# Patient Record
Sex: FEMALE | Race: WHITE | NOT HISPANIC OR LATINO | Employment: UNEMPLOYED | URBAN - METROPOLITAN AREA
[De-identification: names, ages, dates, MRNs, and addresses within clinical notes are randomized per-mention and may not be internally consistent; named-entity substitution may affect disease eponyms.]

---

## 2017-01-04 ENCOUNTER — OFFICE VISIT (OUTPATIENT)
Dept: FAMILY MEDICINE | Facility: CLINIC | Age: 82
End: 2017-01-04
Payer: MEDICARE

## 2017-01-04 VITALS
HEIGHT: 59 IN | WEIGHT: 129 LBS | HEART RATE: 64 BPM | BODY MASS INDEX: 26 KG/M2 | SYSTOLIC BLOOD PRESSURE: 136 MMHG | TEMPERATURE: 98 F | OXYGEN SATURATION: 95 % | DIASTOLIC BLOOD PRESSURE: 76 MMHG

## 2017-01-04 DIAGNOSIS — M41.9 SCOLIOSIS, UNSPECIFIED SCOLIOSIS TYPE, UNSPECIFIED SPINAL REGION: ICD-10-CM

## 2017-01-04 DIAGNOSIS — M19.90 ARTHRITIS: Primary | ICD-10-CM

## 2017-01-04 PROCEDURE — 1159F MED LIST DOCD IN RCRD: CPT | Mod: S$GLB,,, | Performed by: FAMILY MEDICINE

## 2017-01-04 PROCEDURE — 1160F RVW MEDS BY RX/DR IN RCRD: CPT | Mod: S$GLB,,, | Performed by: FAMILY MEDICINE

## 2017-01-04 PROCEDURE — 1157F ADVNC CARE PLAN IN RCRD: CPT | Mod: S$GLB,,, | Performed by: FAMILY MEDICINE

## 2017-01-04 PROCEDURE — 1125F AMNT PAIN NOTED PAIN PRSNT: CPT | Mod: S$GLB,,, | Performed by: FAMILY MEDICINE

## 2017-01-04 PROCEDURE — 99213 OFFICE O/P EST LOW 20 MIN: CPT | Mod: S$GLB,,, | Performed by: FAMILY MEDICINE

## 2017-01-04 RX ORDER — VERAPAMIL HYDROCHLORIDE 240 MG/1
240 TABLET, FILM COATED, EXTENDED RELEASE ORAL DAILY
Qty: 90 TABLET | Refills: 3 | Status: SHIPPED | OUTPATIENT
Start: 2017-01-04 | End: 2018-01-10 | Stop reason: SDUPTHER

## 2017-01-04 RX ORDER — HYDROCODONE BITARTRATE AND ACETAMINOPHEN 10; 325 MG/1; MG/1
1 TABLET ORAL EVERY 8 HOURS PRN
Qty: 30 TABLET | Refills: 0 | Status: SHIPPED | OUTPATIENT
Start: 2017-01-04 | End: 2017-01-12 | Stop reason: ALTCHOICE

## 2017-01-04 RX ORDER — HYDROCODONE BITARTRATE AND ACETAMINOPHEN 10; 325 MG/1; MG/1
1 TABLET ORAL EVERY 8 HOURS PRN
Qty: 30 TABLET | Refills: 0 | Status: SHIPPED | OUTPATIENT
Start: 2017-01-04 | End: 2017-01-04 | Stop reason: SDUPTHER

## 2017-01-04 RX ORDER — AMITRIPTYLINE HYDROCHLORIDE 10 MG/1
10 TABLET, FILM COATED ORAL 2 TIMES DAILY
Qty: 180 TABLET | Refills: 3 | Status: SHIPPED | OUTPATIENT
Start: 2017-01-04 | End: 2018-01-10 | Stop reason: SDUPTHER

## 2017-01-04 NOTE — MR AVS SNAPSHOT
[facility]  [address] [place], [place] [address] [place] [address]  Phone: [phone]  Fax: [phone]                  Apple Watson   2017 2:40 PM   Office Visit    Description:  Female : 1929   Provider:  Tree Rolon MD   Department:  [facility]           Reason for Visit     Annual Exam           Diagnoses this Visit        Comments    Arthritis    -  Primary     Scoliosis, unspecified scoliosis type, unspecified spinal region                To Do List           Future Appointments        Provider Department Dept Phone    2017 10:20 AM Yakov Dickens III, MD [facility] [phone]      Goals (5 Years of Data)     None      Follow-Up and Disposition     Return in about 3 months (around 2017).       These Medications        Disp Refills Start End    hydrocodone-acetaminophen 10-325mg (NORCO)  mg Tab 30 tablet 0 2017     Take 1 tablet by mouth every 8 (eight) hours as needed for Pain. - Oral    Pharmacy: [facility] [address] [place] [address] Ph #: [phone]       amitriptyline (ELAVIL) 10 MG tablet 180 tablet 3 2017     Take 1 tablet (10 mg total) by mouth 2 (two) times daily. - Oral    Pharmacy: [address] Ph #: [phone]       verapamil (CALAN-SR) 240 MG CR tablet 90 tablet 3 2017     Take 1 tablet (240 mg total) by mouth once daily. - Oral    Pharmacy: [address] Ph #: [phone]         [facility]     [facility] -  Assistance  Registered nurses in the [facility] provide clinical advisement, health education, appointment booking, and other advisory services.  Call for this free service at [phone].             Medications           Message regarding Medications     Verify the changes and/or additions to your medication regime listed below are the same  "as discussed with your clinician today.  If any of these changes or additions are incorrect, please notify your healthcare provider.        START taking these NEW medications        Refills    hydrocodone-acetaminophen 10-325mg (NORCO)  mg Tab 0    Sig: Take 1 tablet by mouth every 8 (eight) hours as needed for Pain.    Class: Print    Route: Oral      STOP taking these medications     hydrocodone-acetaminophen 7.5-325mg (NORCO) 7.5-325 mg per tablet 1 tablet every 8 (eight) hours as needed for Pain.            Verify that the below list of medications is an accurate representation of the medications you are currently taking.  If none reported, the list may be blank. If incorrect, please contact your healthcare provider. Carry this list with you in case of emergency.           Current Medications     amitriptyline (ELAVIL) 10 MG tablet Take 1 tablet (10 mg total) by mouth 2 (two) times daily.    ascorbic acid (VITAMIN C) 500 MG tablet Take 500 mg by mouth once daily.    calcium carbonate (OS-TROY) 600 mg (1,500 mg) Tab Take 600 mg by mouth 2 (two) times daily with meals.    cyanocobalamin (VITAMIN B-12) 250 MCG tablet Take 250 mcg by mouth once daily.    ergocalciferol (VITAMIN D2) 50,000 unit Cap Take 50,000 Units by mouth every 7 days.    hydrocodone-acetaminophen 10-325mg (NORCO)  mg Tab Take 1 tablet by mouth every 8 (eight) hours as needed for Pain.    ibuprofen (ADVIL,MOTRIN) 800 MG tablet Take 800 mg by mouth 2 (two) times daily with meals.    verapamil (CALAN-SR) 240 MG CR tablet Take 1 tablet (240 mg total) by mouth once daily.    vitamin A 8000 UNIT capsule Take 8,000 Units by mouth once daily.           Clinical Reference Information           Vital Signs - Last Recorded  Most recent update: 1/4/2017  2:42 PM by Neil Willis LPN    BP Pulse Temp Ht Wt SpO2    136/76 64 98.3 °F (36.8 °C) (Oral) 4' 11" (1.499 m) 58.5 kg (128 lb 15.5 oz) 95%    BMI                26.05 kg/m2          Blood " Pressure          Most Recent Value    BP  136/76      Allergies as of 1/4/2017     Celebrex [Celecoxib]    Cymbalta [Duloxetine]    Iodine And Iodide Containing Products    Lovastatin    Vioxx [Rofecoxib]      Immunizations Administered on Date of Encounter - 1/4/2017     None      Orders Placed During Today's Visit      Normal Orders This Visit    Ambulatory referral to Pain Clinic       [facility] Sign[facility]Up     Activating your [facility] account is as easy as 1-2-3!     1) Visit [email or web address], select Sign Up Now, enter this activation code and your date of birth, then select Next.  [email or web address]  Expires: 2/22/2017 10:08 PM      2) Create a username and password to use when you visit [facility] in the future and select a security question in case you lose your password and select Next.    3) Enter your e-mail address and click Sign Up!    Additional Information  If you have questions, please e-mail [email or web address] or call [phone] to talk to our [facility] staff. Remember, [facility] is NOT to be used for urgent needs. For medical emergencies, dial 911.

## 2017-01-04 NOTE — PROGRESS NOTES
Subjective:      Patient ID: Apple Watson is a 87 y.o. female.    Chief Complaint: Annual Exam (check-up and pain management referral)    HPI Comments: Here for wellness; has to change pain management doctors; has new insurance; Dr Park not on her insurance; needs a new pain management;    Review of Systems   Constitutional: Negative.    HENT: Negative.    Respiratory: Negative.    Cardiovascular: Negative.    Gastrointestinal: Negative.    Endocrine: Negative.    Genitourinary: Negative.    Musculoskeletal: Positive for back pain.   Psychiatric/Behavioral: Negative.    All other systems reviewed and are negative.    Objective:     Physical Exam   Constitutional: She is oriented to person, place, and time. She appears well-developed and well-nourished.   HENT:   Head: Normocephalic.   Eyes: Conjunctivae and EOM are normal. Pupils are equal, round, and reactive to light.   Neck: Normal range of motion. Neck supple.   Cardiovascular: Normal rate, regular rhythm and normal heart sounds.    Pulmonary/Chest: Effort normal and breath sounds normal.   Musculoskeletal: Normal range of motion.   Neurological: She is alert and oriented to person, place, and time. She has normal reflexes.   Skin: Skin is warm and dry.   Psychiatric: She has a normal mood and affect. Her behavior is normal. Judgment and thought content normal.   Nursing note and vitals reviewed.    Assessment:     1. Arthritis    2. Scoliosis, unspecified scoliosis type, unspecified spinal region      Plan:     New Prescriptions    HYDROCODONE-ACETAMINOPHEN 10-325MG (NORCO)  MG TAB    Take 1 tablet by mouth every 8 (eight) hours as needed for Pain.     Discontinued Medications    HYDROCODONE-ACETAMINOPHEN 7.5-325MG (NORCO) 7.5-325 MG PER TABLET    1 tablet every 8 (eight) hours as needed for Pain.      Modified Medications    Modified Medication Previous Medication    AMITRIPTYLINE (ELAVIL) 10 MG TABLET amitriptyline (ELAVIL) 10 MG tablet        Take 1 tablet (10 mg total) by mouth 2 (two) times daily.    Take 1 tablet (10 mg total) by mouth 2 (two) times daily.    VERAPAMIL (CALAN-SR) 240 MG CR TABLET verapamil (CALAN-SR) 240 MG CR tablet       Take 1 tablet (240 mg total) by mouth once daily.    Take 1 tablet (240 mg total) by mouth once daily.       Arthritis  -     Ambulatory referral to Pain Clinic    Scoliosis, unspecified scoliosis type, unspecified spinal region  -     Ambulatory referral to Pain Clinic    Other orders  -     Discontinue: hydrocodone-acetaminophen 10-325mg (NORCO)  mg Tab; Take 1 tablet by mouth every 8 (eight) hours as needed for Pain.  Dispense: 30 tablet; Refill: 0  -     hydrocodone-acetaminophen 10-325mg (NORCO)  mg Tab; Take 1 tablet by mouth every 8 (eight) hours as needed for Pain.  Dispense: 30 tablet; Refill: 0  -     amitriptyline (ELAVIL) 10 MG tablet; Take 1 tablet (10 mg total) by mouth 2 (two) times daily.  Dispense: 180 tablet; Refill: 3  -     verapamil (CALAN-SR) 240 MG CR tablet; Take 1 tablet (240 mg total) by mouth once daily.  Dispense: 90 tablet; Refill: 3

## 2017-01-12 ENCOUNTER — HOSPITAL ENCOUNTER (OUTPATIENT)
Dept: RADIOLOGY | Facility: HOSPITAL | Age: 82
Discharge: HOME | End: 2017-01-12
Attending: ANESTHESIOLOGY
Payer: MEDICARE

## 2017-01-12 ENCOUNTER — OFFICE VISIT (OUTPATIENT)
Dept: PAIN MEDICINE | Facility: CLINIC | Age: 82
End: 2017-01-12
Payer: MEDICARE

## 2017-01-12 VITALS
HEIGHT: 59 IN | BODY MASS INDEX: 25.87 KG/M2 | DIASTOLIC BLOOD PRESSURE: 80 MMHG | WEIGHT: 128.31 LBS | RESPIRATION RATE: 74 BRPM | SYSTOLIC BLOOD PRESSURE: 160 MMHG

## 2017-01-12 DIAGNOSIS — G89.29 CHRONIC LEFT-SIDED LOW BACK PAIN WITH SCIATICA, SCIATICA LATERALITY UNSPECIFIED: ICD-10-CM

## 2017-01-12 DIAGNOSIS — M54.40 CHRONIC LEFT-SIDED LOW BACK PAIN WITH SCIATICA, SCIATICA LATERALITY UNSPECIFIED: ICD-10-CM

## 2017-01-12 DIAGNOSIS — M41.9 SCOLIOSIS OF THORACOLUMBAR SPINE, UNSPECIFIED SCOLIOSIS TYPE: ICD-10-CM

## 2017-01-12 DIAGNOSIS — M54.16 LEFT LUMBAR RADICULOPATHY: Primary | ICD-10-CM

## 2017-01-12 PROCEDURE — 1160F RVW MEDS BY RX/DR IN RCRD: CPT | Mod: S$GLB,,, | Performed by: ANESTHESIOLOGY

## 2017-01-12 PROCEDURE — 99999 PR PBB SHADOW E&M-EST. PATIENT-LVL III: CPT | Mod: PBBFAC,,, | Performed by: ANESTHESIOLOGY

## 2017-01-12 PROCEDURE — 1157F ADVNC CARE PLAN IN RCRD: CPT | Mod: S$GLB,,, | Performed by: ANESTHESIOLOGY

## 2017-01-12 PROCEDURE — 1159F MED LIST DOCD IN RCRD: CPT | Mod: S$GLB,,, | Performed by: ANESTHESIOLOGY

## 2017-01-12 PROCEDURE — 99204 OFFICE O/P NEW MOD 45 MIN: CPT | Mod: S$GLB,,, | Performed by: ANESTHESIOLOGY

## 2017-01-12 PROCEDURE — 72070 X-RAY EXAM THORAC SPINE 2VWS: CPT | Mod: TC,PO

## 2017-01-12 PROCEDURE — 1125F AMNT PAIN NOTED PAIN PRSNT: CPT | Mod: S$GLB,,, | Performed by: ANESTHESIOLOGY

## 2017-01-12 PROCEDURE — 72100 X-RAY EXAM L-S SPINE 2/3 VWS: CPT | Mod: TC,PO

## 2017-01-12 RX ORDER — HYDROCODONE BITARTRATE AND ACETAMINOPHEN 10; 325 MG/1; MG/1
1 TABLET ORAL EVERY 8 HOURS PRN
Qty: 90 TABLET | Refills: 0 | Status: SHIPPED | OUTPATIENT
Start: 2017-01-12 | End: 2017-02-09 | Stop reason: SDUPTHER

## 2017-01-12 NOTE — LETTER
January 12, 2017      Tree Rolon MD  [address] [facility] [address]           [facility]  [address] [facility] [address]  Phone: [phone]  Fax: [phone]          Patient: Apple Watson   MR Number: [id number]   YOB: 1929   Date of Visit: 1/12/2017       Dear Dr. Tree Rolon:    Thank you for referring Apple Watson to me for evaluation. Attached you will find relevant portions of my assessment and plan of care.    If you have questions, please do not hesitate to call me. I look forward to following Apple Watson along with you.    Sincerely,    Yakov Dickens III, MD    Enclosure  CC:  No Recipients    If you would like to receive this communication electronically, please contact [email or web address] or [phone] to request more information on [facility] Link access.    For providers and/or their staff who would like to refer a patient to [facility], please contact us through our one-stop-shop provider referral line, [facility], at [phone].    If you feel you have received this communication in error or would no longer like to receive these types of communications, please e-mail [email or web address]

## 2017-01-12 NOTE — PROGRESS NOTES
Chronic Pain - New Consult    Referring Physician: Tree Rolon MD    Chief Complaint:   Chief Complaint   Patient presents with    Hip Pain        SUBJECTIVE:    Apple Watson presents to the clinic for the evaluation of left hip pain. The pain started several years ago.  She denies any accident or injury. The pain is located in the left posterior hip/buttock area and radiates down the back of the left leg to the bottom of the left foot.  The pain is described as aching and stabbing and is rated as 5/10. The pain is rated with a score of  3/10 on the BEST day and a score of 9/10 on the WORST day.  Symptoms interfere with daily activity and sleeping. The pain is exacerbated by Standing, Bending, Walking, Extension, Flexing and Getting out of bed/chair.  The pain is mitigated by heat, laying down and pain medication. The patient reports 4 hours of uninterrupted sleep per night.    She is a former patient of Dr. Park who was prescribing her Norco 10/325 TID for pain. Dr. Park is no longer on her insurance plan and she is need of a new pain management physician. She has tried non-opioid pain medication including NSAIDS, Cymbalta, and Savella which she stopped due to side effects or lack of efficacy. She had lumbar injections in the past which did not help. She has severe daily pain. Pain medications allow her to perform activities of daily living that she would otherwise not be able to perform. She denies any ongoing substance abuse.     Patient denies night fever/night sweats, urinary incontinence, bowel incontinence, significant weight loss, significant motor weakness and loss of sensations.    Physical Therapy/Home Exercise: no      Pain Disability Index Review:  Last 3 PDI Scores 1/12/2017   Pain Disability Index (PDI) 29       Pain Medications:    - Norco 10/325 TID as needed  - Amitriptyline 10 mg BID     report:  Reviewed and consistent with medication use as prescribed.    Pain  Procedures: Lumbar injections in the past    Imaging: Not available    History reviewed. No pertinent past medical history.  Past Surgical History   Procedure Laterality Date    Tonsillectomy      Appendectomy      Gallbladder surgery      Hysterectomy       Social History     Social History    Marital status:      Spouse name: N/A    Number of children: N/A    Years of education: N/A     Occupational History    Not on file.     Social History Main Topics    Smoking status: Never Smoker    Smokeless tobacco: Not on file    Alcohol use No    Drug use: No    Sexual activity: Not on file     Other Topics Concern    Not on file     Social History Narrative     History reviewed. No pertinent family history.    Review of patient's allergies indicates:   Allergen Reactions    Celebrex [celecoxib] Other (See Comments)     Blood in bowel    Cymbalta [duloxetine]     Iodine and iodide containing products     Lovastatin     Sulpho-lac [sulfur]     Vioxx [rofecoxib]     Savella [milnacipran] Rash       Current Outpatient Prescriptions   Medication Sig    amitriptyline (ELAVIL) 10 MG tablet Take 1 tablet (10 mg total) by mouth 2 (two) times daily.    ascorbic acid (VITAMIN C) 500 MG tablet Take 500 mg by mouth once daily.    cyanocobalamin (VITAMIN B-12) 250 MCG tablet Take 250 mcg by mouth once daily.    ergocalciferol (VITAMIN D2) 50,000 unit Cap Take 50,000 Units by mouth every 7 days.    verapamil (CALAN-SR) 240 MG CR tablet Take 1 tablet (240 mg total) by mouth once daily.    vitamin A 8000 UNIT capsule Take 8,000 Units by mouth once daily.    hydrocodone-acetaminophen 10-325mg (NORCO)  mg Tab Take 1 tablet by mouth every 8 (eight) hours as needed for Pain.     No current facility-administered medications for this visit.        REVIEW OF SYSTEMS:  GENERAL: No weight loss, malaise or fevers.  HEENT: Negative for frequent or significant headaches.  NECK: Negative for lumps or  "significant neck swelling.  RESPIRATORY: Negative for cough, wheezing or shortness of breath.  CARDIOVASCULAR: Negative for chest pain or palpitations. +HTN  GI: No blood in stools or black stools or change in bowel habits.  : Negative for kidney stones, urinary tract infections, or incontinence.  MUSCULOSKELETAL: See HPI  SKIN: Negative for lesions, rash, and itching.  PSYCH: + sleep disturbance   HEMATOLOGY/LYMPHOLOGY Negative for prolonged bleeding, bruising easily or swollen nodes.  NEURO:  No history of syncope, seizures or tremors.    OBJECTIVE:    Visit Vitals    BP (!) 160/80 (BP Location: Right arm, Patient Position: Sitting, BP Method: Manual)    Resp (!) 74    Ht 4' 11" (1.499 m)    Wt 58.2 kg (128 lb 4.8 oz)    BMI 25.91 kg/m2       PHYSICAL EXAMINATION:  GENERAL: Well appearing, in no acute distress.  PSYCH:  Mood and affect is appropriate.  Awake, alert, and oriented x 3.  SKIN: Skin color, texture, turgor normal, no rashes or lesions  HEENT: Normocephalic, atraumatic.  EOM intact.  CV: Radial pulses are 2+.  RESP:  Respirations are unlabored.  GI: Abdomen soft and non-tender.  MSK:  No atrophy or tone abnormalities are noted.      Neck: No pain with neck flexion, extension, or lateral rotation.  No obvious deformity or signs of trauma.  Normal cervical spine range of motion.    Back: Straight leg raising in the sitting position is positive for radicular pain on the left. No pain to palpation over the lumbar spine and paraspinous muscles.  Negative for pain with facet loading and back extension/rotation. Decreased range of motion with pain reproduction on flexion and extension.    Buttocks:  No pain to palpation over the PSIS.    Extremities:  Peripheral joint ROM is full and pain free without obvious instability or laxity in all four extremities. No edema or skin discolorations noted.     Gait:  Gait is normal    NEUR:  No loss of sensation is noted.     Strength testing:    Right hip flexion: " 5/5  Left hip flexion: 5/5  Right knee extension: 5/5  Left knee extension: 5/5  Right knee flexion: 5/5  Left knee flexion: 5/5  Right ankle dorsiflexion: 5/5  Left ankle dorsiflexion: 5/5    Reflexes:  2+ Right Patellar reflex, 2+ Left Patellar reflex  2+ Right Achilles reflex, 2+ Left Achilles reflex      ASSESSMENT: 87 y.o. female with chronic low back and left leg pain, consistent with lumbar radiculopathy.     1. Left lumbar radiculopathy    2. Scoliosis of thoracolumbar spine, unspecified scoliosis type    3. 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  - Obtain old records from outside physicians and imaging.  - Order x-rays of thoracic and lumbar spine.  - Continue Norco 10/325 TID as needed. Opioid agreement reviewed and signed with patient. Key points clarified including UDS policy, our refill protocol, and the importance of taking medication as prescribed and not sharing medications with others. Will attempt to avoid further increases in opioids by use of interventions.  - I discussed with the patient potential secondary side effects of medication prescribed and urged the patient to read all FDA approved materials that the pharmacy provides with the prescription.   - RTC in 4 weeks.    The above plan and management options were discussed at length with patient. Patient is in agreement with the above and verbalized understanding. It will be communicated with the referring physician via electronic record, fax, or mail.    Yakov Dickens III  01/12/2017

## 2017-02-09 ENCOUNTER — OFFICE VISIT (OUTPATIENT)
Dept: PAIN MEDICINE | Facility: CLINIC | Age: 82
End: 2017-02-09
Payer: MEDICARE

## 2017-02-09 VITALS
DIASTOLIC BLOOD PRESSURE: 90 MMHG | HEIGHT: 59 IN | SYSTOLIC BLOOD PRESSURE: 178 MMHG | HEART RATE: 96 BPM | WEIGHT: 125.69 LBS | BODY MASS INDEX: 25.34 KG/M2 | RESPIRATION RATE: 20 BRPM

## 2017-02-09 DIAGNOSIS — M47.816 OSTEOARTHRITIS OF LUMBAR SPINE, UNSPECIFIED SPINAL OSTEOARTHRITIS COMPLICATION STATUS: Primary | ICD-10-CM

## 2017-02-09 DIAGNOSIS — M41.9 SCOLIOSIS OF THORACOLUMBAR SPINE, UNSPECIFIED SCOLIOSIS TYPE: ICD-10-CM

## 2017-02-09 DIAGNOSIS — M43.17 SPONDYLOLISTHESIS OF LUMBOSACRAL REGION: ICD-10-CM

## 2017-02-09 DIAGNOSIS — G89.4 CHRONIC PAIN DISORDER: ICD-10-CM

## 2017-02-09 DIAGNOSIS — S22.000A COMPRESSION FRACTURE OF BODY OF THORACIC VERTEBRA: ICD-10-CM

## 2017-02-09 DIAGNOSIS — M81.0 OSTEOPOROSIS: ICD-10-CM

## 2017-02-09 DIAGNOSIS — Z79.891 LONG TERM (CURRENT) USE OF OPIATE ANALGESIC: ICD-10-CM

## 2017-02-09 PROCEDURE — 1125F AMNT PAIN NOTED PAIN PRSNT: CPT | Mod: S$GLB,,, | Performed by: ANESTHESIOLOGY

## 2017-02-09 PROCEDURE — 99999 PR PBB SHADOW E&M-EST. PATIENT-LVL III: CPT | Mod: PBBFAC,,, | Performed by: ANESTHESIOLOGY

## 2017-02-09 PROCEDURE — 1160F RVW MEDS BY RX/DR IN RCRD: CPT | Mod: S$GLB,,, | Performed by: ANESTHESIOLOGY

## 2017-02-09 PROCEDURE — 99213 OFFICE O/P EST LOW 20 MIN: CPT | Mod: S$GLB,,, | Performed by: ANESTHESIOLOGY

## 2017-02-09 PROCEDURE — 1159F MED LIST DOCD IN RCRD: CPT | Mod: S$GLB,,, | Performed by: ANESTHESIOLOGY

## 2017-02-09 PROCEDURE — 1157F ADVNC CARE PLAN IN RCRD: CPT | Mod: S$GLB,,, | Performed by: ANESTHESIOLOGY

## 2017-02-09 RX ORDER — GABAPENTIN 300 MG/1
300 CAPSULE ORAL NIGHTLY
Qty: 30 CAPSULE | Refills: 11 | Status: ON HOLD | OUTPATIENT
Start: 2017-02-09 | End: 2018-12-03 | Stop reason: HOSPADM

## 2017-02-09 RX ORDER — HYDROCODONE BITARTRATE AND ACETAMINOPHEN 10; 325 MG/1; MG/1
1 TABLET ORAL EVERY 8 HOURS PRN
Qty: 90 TABLET | Refills: 0 | Status: SHIPPED | OUTPATIENT
Start: 2017-03-12 | End: 2017-03-01 | Stop reason: SDUPTHER

## 2017-02-09 RX ORDER — HYDROCODONE BITARTRATE AND ACETAMINOPHEN 10; 325 MG/1; MG/1
1 TABLET ORAL EVERY 8 HOURS PRN
Qty: 90 TABLET | Refills: 0 | Status: SHIPPED | OUTPATIENT
Start: 2017-02-10 | End: 2017-03-12

## 2017-02-09 NOTE — PROGRESS NOTES
Chronic patient Established Note (Follow up visit)      SUBJECTIVE:    Apple Watson presents to the clinic for a follow-up appointment for back pain. Since the last visit, Apple Watson states the pain has been persistent. Current pain intensity is 6/10.  She complains today of pain throughout the thoracic and lumbar spine area. She does not report any new area of pain. She does not have any radicular pain. She is not interested in any spine intervention. She is not interested in surgical referral.    She has tried non-opioid pain medication including NSAIDS, Cymbalta, and Savella which she stopped due to side effects or lack of efficacy. She had lumbar injections in the past which did not help. She has severe daily pain. Pain medications allow her to perform activities of daily living that she would otherwise not be able to perform. She denies any ongoing substance abuse.     Patient denies night fever/night sweats, urinary incontinence, bowel incontinence, significant weight loss, significant motor weakness and loss of sensations.    Pain Disability Index Review:  Last 3 PDI Scores 2/9/2017 1/12/2017   Pain Disability Index (PDI) 39 29       Pain Medications:     - Norco 10/325 TID as needed  - Amitriptyline 10 mg BID      report: Reviewed and consistent with medication use as prescribed.     Pain Procedures:   Left Facet Joint Injection at L4/5 and L5/S1- Records Scanned (2011)  Left Sacroiliac Joint Injection - Records Scanned (2011)  Left S1 - TESI - Records scanned (5/15/2012)     Imaging:    Lumbar X-ray (1/2017):    Findings:  There is lumbar scoliosis convex to left..    Bones are osteopenic.    Grade 2 L5-S1 spondylolisthesis.  There is also retrolisthesis L2 on L3 by approximate 5 mm.  There appear to be L5 pars defects.  There is disc space narrowing involving L2-S1.  Anterior osteophyte formation at several levels.  Compression of T7 and T8, age indeterminate. Stable alignment on the flexion  and extension views.    Atherosclerotic abdominal aorta measuring up to approximately 2.6 cm in AP dimension.    Thoracic X-ray (1/2017):    FINDINGS:  The bones are osteopenic.  Mild curvature of the thoracic spine convex to the right with a Ferguson angle measuring 9 degrees..  There is compression of T3, T7 and T8, age indeterminate.  Disc space narrowing at multiple levels.    Allergies:   Review of patient's allergies indicates:   Allergen Reactions    Celebrex [celecoxib] Other (See Comments)     Blood in bowel    Cymbalta [duloxetine]     Iodine and iodide containing products     Lovastatin     Sulpho-lac [sulfur]     Vioxx [rofecoxib]     Savella [milnacipran] Rash       Current Medications:   Current Outpatient Prescriptions   Medication Sig Dispense Refill    amitriptyline (ELAVIL) 10 MG tablet Take 1 tablet (10 mg total) by mouth 2 (two) times daily. 180 tablet 3    ascorbic acid (VITAMIN C) 500 MG tablet Take 500 mg by mouth once daily.      cyanocobalamin (VITAMIN B-12) 250 MCG tablet Take 250 mcg by mouth once daily.      ergocalciferol (VITAMIN D2) 50,000 unit Cap Take 50,000 Units by mouth every 7 days.      [START ON 2/10/2017] hydrocodone-acetaminophen 10-325mg (NORCO)  mg Tab Take 1 tablet by mouth every 8 (eight) hours as needed for Pain. 90 tablet 0    verapamil (CALAN-SR) 240 MG CR tablet Take 1 tablet (240 mg total) by mouth once daily. 90 tablet 3    vitamin A 8000 UNIT capsule Take 8,000 Units by mouth once daily.      gabapentin (NEURONTIN) 300 MG capsule Take 1 capsule (300 mg total) by mouth every evening. 30 capsule 11    [START ON 3/12/2017] hydrocodone-acetaminophen 10-325mg (NORCO)  mg Tab Take 1 tablet by mouth every 8 (eight) hours as needed for Pain. 90 tablet 0     No current facility-administered medications for this visit.        REVIEW OF SYSTEMS:    GENERAL: No weight loss, malaise or fevers.  HEENT: Negative for frequent or significant  "headaches.  NECK: Negative for lumps or significant neck swelling.  RESPIRATORY: Negative for cough, wheezing or shortness of breath.  CARDIOVASCULAR: Negative for chest pain or palpitations. +HTN  GI: No blood in stools or black stools or change in bowel habits.  : Negative for kidney stones, urinary tract infections, or incontinence.  MUSCULOSKELETAL: See HPI  SKIN: Negative for lesions, rash, and itching.  PSYCH: + sleep disturbance   HEMATOLOGY/LYMPHOLOGY Negative for prolonged bleeding, bruising easily or swollen nodes.  NEURO: No history of syncope, seizures or tremors.    Past Medical History:  History reviewed. No pertinent past medical history.    Past Surgical History:  Past Surgical History   Procedure Laterality Date    Tonsillectomy      Appendectomy      Gallbladder surgery      Hysterectomy         Family History:  History reviewed. No pertinent family history.    Social History:  Social History     Social History    Marital status:      Spouse name: N/A    Number of children: N/A    Years of education: N/A     Social History Main Topics    Smoking status: Never Smoker    Smokeless tobacco: None    Alcohol use No    Drug use: No    Sexual activity: Not Asked     Other Topics Concern    None     Social History Narrative       OBJECTIVE:    Visit Vitals    BP (!) 178/90    Pulse 96    Resp 20    Ht 4' 11" (1.499 m)    Wt 57 kg (125 lb 11.2 oz)    BMI 25.39 kg/m2       PHYSICAL EXAMINATION:  GENERAL: Well appearing, in no acute distress.  PSYCH: Mood and affect is appropriate. Awake, alert, and oriented x 3.  SKIN: Skin color, texture, turgor normal, no rashes or lesions  HEENT: Normocephalic, atraumatic. EOM intact.  CV: Radial pulses are 2+.  RESP: Respirations are unlabored.  GI: Abdomen soft and non-tender.  MSK: No atrophy or tone abnormalities are noted.       Neck: No pain with neck flexion, extension, or lateral rotation. Normal cervical spine range of " motion.     Back: Thoracic kyphosis. Diffuse tenderness to palpation over the thoracic spine. No pain to palpation over the lumbar spine and paraspinous muscles. Positive for pain with facet loading and back extension/rotation. Decreased range of motion with pain reproduction on flexion and extension.     Buttocks: No pain to palpation over the PSIS.     Extremities: Peripheral joint ROM is full and pain free without obvious instability or laxity in all four extremities. No edema or skin discolorations noted.      Gait: Gait is normal     NEUR: No loss of sensation is noted. Lower extremity strength is normal and symmetric.    ASSESSMENT:      1. Osteoarthritis of lumbar spine, unspecified spinal osteoarthritis complication status    2. Scoliosis of thoracolumbar spine, unspecified scoliosis type    3. Spondylolisthesis of lumbosacral region    4. Compression fracture of body of thoracic vertebra    5. Osteoporosis    6. Long term (current) use of opiate analgesic    7. Chronic pain disorder          PLAN:     - I have stressed the importance of physical activity and a home exercise plan to help with pain and improve health.  - Outside records reviewed.  - Start Gabapentin 300 mg QHS.  - OK to take Motrin/Tylenol as needed between Norco.  - Continue Norco 10/325 TID as needed. Opioid agreement reviewed. Key points clarified including UDS policy, our refill protocol, and the importance of taking medication as prescribed and not sharing medications with others. Will attempt to avoid further increases in opioids by use of interventions.  - I discussed with the patient potential secondary side effects of medication prescribed and urged the patient to read all FDA approved materials that the pharmacy provides with the prescription.  - Advised the patient to follow-up with Dr. Rolon for further management of hypertension.   - RTC in 8 weeks.    The above plan and management options were discussed at length with patient.  Patient is in agreement with the above and verbalized understanding.    Yakov Dickens III  02/09/2017

## 2017-02-09 NOTE — MR AVS SNAPSHOT
[facility]  [address] [place] [address]  Phone: [phone]  Fax: [phone]                  Apple Watson   2017 8:20 AM   Office Visit    Description:  Female : 1929   Provider:  Yakov Dickens III, MD   Department:  [facility]           Reason for Visit     Back Pain                To Do List           Future Appointments        Provider Department Dept Phone    2017 8:40 AM Yakov Dickens III, MD [facility] [phone]      Goals (5 Years of Data)     None      [facility]     [facility] Line -  Assistance  Registered nurses in the [facility] On Call Center provide clinical advisement, health education, appointment booking, and other advisory services.  Call for this free service at [phone].             Medications           Message regarding Medications     Verify the changes and/or additions to your medication regime listed below are the same as discussed with your clinician today.  If any of these changes or additions are incorrect, please notify your healthcare provider.             Verify that the below list of medications is an accurate representation of the medications you are currently taking.  If none reported, the list may be blank. If incorrect, please contact your healthcare provider. Carry this list with you in case of emergency.           Current Medications     amitriptyline (ELAVIL) 10 MG tablet Take 1 tablet (10 mg total) by mouth 2 (two) times daily.    ascorbic acid (VITAMIN C) 500 MG tablet Take 500 mg by mouth once daily.    cyanocobalamin (VITAMIN B-12) 250 MCG tablet Take 250 mcg by mouth once daily.    ergocalciferol (VITAMIN D2) 50,000 unit Cap Take 50,000 Units by mouth every 7 days.    hydrocodone-acetaminophen 10-325mg (NORCO)  mg Tab Take 1 tablet by mouth every 8 (eight) hours as needed for Pain.    verapamil (CALAN-SR) 240 MG CR tablet Take 1 tablet  "(240 mg total) by mouth once daily.    vitamin A 8000 UNIT capsule Take 8,000 Units by mouth once daily.           Clinical Reference Information           Your Vitals Were     BP Pulse Resp Height Weight BMI    178/90 96 20 4' 11" (1.499 m) 57 kg (125 lb 11.2 oz) 25.39 kg/m2      Blood Pressure          Most Recent Value    BP  (!)  178/90      Allergies as of 2/9/2017     Celebrex [Celecoxib]    Cymbalta [Duloxetine]    Iodine And Iodide Containing Products    Lovastatin    Sulpho-lac [Sulfur]    Vioxx [Rofecoxib]    Savella [Milnacipran]      Immunizations Administered on Date of Encounter - 2/9/2017     None      [facility] Sign-Up     Activating your [facility] account is as easy as 1-2-3!     1) Visit [email or web address], select Sign Up Now, enter this activation code and your date of birth, then select Next.  [email or web address]  Expires: 2/22/2017 10:08 PM      2) Create a username and password to use when you visit [facility] in the future and select a security question in case you lose your password and select Next.    3) Enter your e-mail address and click Sign Up!    Additional Information  If you have questions, please e-mail [email or web address] or call [phone] to talk to our [facility] staff. Remember, [facility] is NOT to be used for urgent needs. For medical emergencies, dial 911.         Language Assistance Services     ATTENTION: Language assistance services are available, free of charge. Please call [phone].      ATENCIÓN: Si habla espjodi, tiene a serrano disposición servicios gratuitos de asistencia lingüística. Llame al [phone].     ELIECER Ý: N?u b?n nói Ti?ng Vi?t, có các d?ch v? h? tr? ngôn ng? mi?n phí dành cho b?n. G?i s? [phone].         [facility] complies with applicable Federal civil rights laws and does not discriminate on the basis of race, color, national origin, age, disability, or sex.        "

## 2017-03-01 ENCOUNTER — OFFICE VISIT (OUTPATIENT)
Dept: FAMILY MEDICINE | Facility: CLINIC | Age: 82
End: 2017-03-01
Payer: MEDICARE

## 2017-03-01 VITALS
TEMPERATURE: 98 F | WEIGHT: 128.5 LBS | HEIGHT: 59 IN | SYSTOLIC BLOOD PRESSURE: 162 MMHG | BODY MASS INDEX: 25.91 KG/M2 | OXYGEN SATURATION: 96 % | HEART RATE: 78 BPM | DIASTOLIC BLOOD PRESSURE: 84 MMHG

## 2017-03-01 DIAGNOSIS — I49.9 IRREGULAR HEART BEAT: ICD-10-CM

## 2017-03-01 DIAGNOSIS — M41.9 SCOLIOSIS OF THORACOLUMBAR SPINE, UNSPECIFIED SCOLIOSIS TYPE: ICD-10-CM

## 2017-03-01 DIAGNOSIS — M81.0 OSTEOPOROSIS: ICD-10-CM

## 2017-03-01 DIAGNOSIS — M43.10 SPONDYLOLISTHESIS, GRADE 2: ICD-10-CM

## 2017-03-01 DIAGNOSIS — I10 ESSENTIAL HYPERTENSION: Primary | ICD-10-CM

## 2017-03-01 DIAGNOSIS — I10 WHITE COAT SYNDROME WITH DIAGNOSIS OF HYPERTENSION: ICD-10-CM

## 2017-03-01 DIAGNOSIS — I70.0 CALCIFICATION OF AORTA: ICD-10-CM

## 2017-03-01 DIAGNOSIS — M19.90 ARTHRITIS: ICD-10-CM

## 2017-03-01 PROCEDURE — 93005 ELECTROCARDIOGRAM TRACING: CPT | Mod: S$GLB,,, | Performed by: FAMILY MEDICINE

## 2017-03-01 PROCEDURE — 1160F RVW MEDS BY RX/DR IN RCRD: CPT | Mod: S$GLB,,, | Performed by: FAMILY MEDICINE

## 2017-03-01 PROCEDURE — 1157F ADVNC CARE PLAN IN RCRD: CPT | Mod: S$GLB,,, | Performed by: FAMILY MEDICINE

## 2017-03-01 PROCEDURE — 1125F AMNT PAIN NOTED PAIN PRSNT: CPT | Mod: S$GLB,,, | Performed by: FAMILY MEDICINE

## 2017-03-01 PROCEDURE — 1159F MED LIST DOCD IN RCRD: CPT | Mod: S$GLB,,, | Performed by: FAMILY MEDICINE

## 2017-03-01 PROCEDURE — 99499 UNLISTED E&M SERVICE: CPT | Mod: S$GLB,,, | Performed by: FAMILY MEDICINE

## 2017-03-01 PROCEDURE — 93010 ELECTROCARDIOGRAM REPORT: CPT | Mod: ,,, | Performed by: INTERNAL MEDICINE

## 2017-03-01 PROCEDURE — 99213 OFFICE O/P EST LOW 20 MIN: CPT | Mod: S$GLB,,, | Performed by: FAMILY MEDICINE

## 2017-03-01 NOTE — PROGRESS NOTES
Subjective:      Patient ID: Apple Watson is a 87 y.o. female.    Chief Complaint: Hypertension    HPI Comments: Saw pain MD; wants to see Dr Pete; bp good at home; high in our offices    Hypertension       Review of Systems   Constitutional: Negative.    HENT: Negative.    Respiratory: Negative.    Cardiovascular: Negative.    Gastrointestinal: Negative.    Endocrine: Negative.    Genitourinary: Negative.    Musculoskeletal: Positive for back pain.   Psychiatric/Behavioral: Negative.    All other systems reviewed and are negative.    Objective:     Physical Exam   Constitutional: She is oriented to person, place, and time. She appears well-developed and well-nourished.   HENT:   Head: Normocephalic.   Eyes: Conjunctivae and EOM are normal. Pupils are equal, round, and reactive to light.   Neck: Normal range of motion. Neck supple.   Cardiovascular: Normal rate, regular rhythm and normal heart sounds.    Pulmonary/Chest: Effort normal and breath sounds normal.   Musculoskeletal: Normal range of motion.   Neurological: She is alert and oriented to person, place, and time. She has normal reflexes.   Skin: Skin is warm and dry.   Psychiatric: She has a normal mood and affect. Her behavior is normal. Judgment and thought content normal.   Nursing note and vitals reviewed.    Assessment:     1. Essential hypertension    2. Arthritis    3. Osteoporosis    4. Scoliosis of thoracolumbar spine, unspecified scoliosis type    5. Spondylolisthesis, grade 2    6. White coat syndrome with diagnosis of hypertension    7. Calcification of aorta    8. Irregular heart beat      Plan:     New Prescriptions    No medications on file     Discontinued Medications    HYDROCODONE-ACETAMINOPHEN 10-325MG (NORCO)  MG TAB    Take 1 tablet by mouth every 8 (eight) hours as needed for Pain.     Modified Medications    No medications on file       Essential hypertension  -     Ambulatory referral to Pain Clinic  -     EKG  12-lead    Arthritis  -     Ambulatory referral to Pain Clinic  -     EKG 12-lead    Osteoporosis  -     Ambulatory referral to Pain Clinic  -     EKG 12-lead    Scoliosis of thoracolumbar spine, unspecified scoliosis type  -     Ambulatory referral to Pain Clinic  -     EKG 12-lead    Spondylolisthesis, grade 2  -     Ambulatory referral to Pain Clinic  -     EKG 12-lead    White coat syndrome with diagnosis of hypertension  -     EKG 12-lead    Calcification of aorta  -     EKG 12-lead    Irregular heart beat  -     EKG 12-lead    pt will come back with her cuff and check her bp here and if same numbers, will use home numbers as baseline.  xrays of t and l spine reviewed; terrible back, no wonder she hurts.

## 2017-03-01 NOTE — MR AVS SNAPSHOT
[place] [address] [place] [address]  Phone: [phone]  Fax: [phone]                  Apple Watson   3/1/2017 11:00 AM   Office Visit    Description:  Female : 1929   Provider:  Tree Rolon MD   Department:  [facility]           Reason for Visit     Hypertension           Diagnoses this Visit        Comments    Essential hypertension    -  Primary     Arthritis         Osteoporosis         Scoliosis of thoracolumbar spine, unspecified scoliosis type         Spondylolisthesis, grade 2         White coat syndrome with diagnosis of hypertension         Calcification of aorta         Irregular heart beat                To Do List           Future Appointments        Provider Department Dept Phone    2017 8:40 AM Yakov Dickens III, MD [facility] [phone]      Goals (5 Years of Data)     None      Follow-Up and Disposition     Return in about 1 week (around 3/8/2017).      [facility]     [facility] On Call Nurse Care Line -  Assistance  Registered nurses in the [facility] On Call Center provide clinical advisement, health education, appointment booking, and other advisory services.  Call for this free service at [phone].             Medications           Message regarding Medications     Verify the changes and/or additions to your medication regime listed below are the same as discussed with your clinician today.  If any of these changes or additions are incorrect, please notify your healthcare provider.             Verify that the below list of medications is an accurate representation of the medications you are currently taking.  If none reported, the list may be blank. If incorrect, please contact your healthcare provider. Carry this list with you in case of emergency.           Current Medications     amitriptyline (ELAVIL) 10 MG tablet Take 1 tablet (10 mg total) by mouth 2 (two) times daily.    ascorbic  acid (VITAMIN C) 500 MG tablet Take 500 mg by mouth once daily.    cyanocobalamin (VITAMIN B-12) 250 MCG tablet Take 250 mcg by mouth once daily.    ergocalciferol (VITAMIN D2) 50,000 unit Cap Take 50,000 Units by mouth every 7 days.    gabapentin (NEURONTIN) 300 MG capsule Take 1 capsule (300 mg total) by mouth every evening.    hydrocodone-acetaminophen 10-325mg (NORCO)  mg Tab Take 1 tablet by mouth every 8 (eight) hours as needed for Pain.    verapamil (CALAN-SR) 240 MG CR tablet Take 1 tablet (240 mg total) by mouth once daily.    vitamin A 8000 UNIT capsule Take 8,000 Units by mouth once daily.           Clinical Reference Information           Your Vitals Were     BP                   162/84           Blood Pressure          Most Recent Value    BP  (!)  162/84      Allergies as of 3/1/2017     Celebrex [Celecoxib]    Cymbalta [Duloxetine]    Iodine And Iodide Containing Products    Lovastatin    Sulpho-lac [Sulfur]    Vioxx [Rofecoxib]    Savella [Milnacipran]      Immunizations Administered on Date of Encounter - 3/1/2017     None      Orders Placed During Today's Visit      Normal Orders This Visit    Ambulatory referral to Pain Clinic     EKG 12-lead       [facility] Sign-Up     Activating your [facility] account is as easy as 1-2-3!     1) Visit [email or web address], select Sign Up Now, enter this activation code and your date of birth, then select Next.  [email or web address]  Expires: 4/16/2017  6:24 AM      2) Create a username and password to use when you visit [facility] in the future and select a security question in case you lose your password and select Next.    3) Enter your e-mail address and click Sign Up!    Additional Information  If you have questions, please e-mail [email or web address] or call [phone] to talk to our [facility] staff. Remember, [facility] is NOT to be used for urgent needs. For medical emergencies, dial 911.         Language Assistance Services     ATTENTION: Language  assistance services are available, free of charge. Please call [phone].      ATENCIÓN: Si surekha oden, tiene a serrano disposición servicios gratuitos de asistencia lingüística. Llame al [phone].     CHÚ Ý: N?u b?n nói Ti?ng Vi?t, có các d?ch v? h? tr? ngôn ng? mi?n phí dành cho b?n. G?i s? [phone].         [facility] complies with applicable Federal civil rights laws and does not discriminate on the basis of race, color, national origin, age, disability, or sex.

## 2018-01-10 ENCOUNTER — OFFICE VISIT (OUTPATIENT)
Dept: FAMILY MEDICINE | Facility: CLINIC | Age: 83
End: 2018-01-10
Payer: MEDICARE

## 2018-01-10 VITALS
WEIGHT: 128.5 LBS | HEART RATE: 82 BPM | BODY MASS INDEX: 25.91 KG/M2 | DIASTOLIC BLOOD PRESSURE: 80 MMHG | SYSTOLIC BLOOD PRESSURE: 130 MMHG | TEMPERATURE: 99 F | HEIGHT: 59 IN | OXYGEN SATURATION: 96 %

## 2018-01-10 DIAGNOSIS — I70.0 AORTIC CALCIFICATION: ICD-10-CM

## 2018-01-10 DIAGNOSIS — M41.9 SCOLIOSIS OF THORACOLUMBAR SPINE, UNSPECIFIED SCOLIOSIS TYPE: ICD-10-CM

## 2018-01-10 DIAGNOSIS — M43.10 SPONDYLOLISTHESIS, GRADE 2: ICD-10-CM

## 2018-01-10 DIAGNOSIS — I49.9 IRREGULAR HEART BEAT: ICD-10-CM

## 2018-01-10 DIAGNOSIS — I10 WHITE COAT SYNDROME WITH DIAGNOSIS OF HYPERTENSION: ICD-10-CM

## 2018-01-10 DIAGNOSIS — M19.90 ARTHRITIS: ICD-10-CM

## 2018-01-10 DIAGNOSIS — I70.0 CALCIFICATION OF AORTA: ICD-10-CM

## 2018-01-10 DIAGNOSIS — M81.0 OSTEOPOROSIS, UNSPECIFIED OSTEOPOROSIS TYPE, UNSPECIFIED PATHOLOGICAL FRACTURE PRESENCE: ICD-10-CM

## 2018-01-10 DIAGNOSIS — Z00.00 WELLNESS EXAMINATION: Primary | ICD-10-CM

## 2018-01-10 DIAGNOSIS — I10 ESSENTIAL HYPERTENSION: ICD-10-CM

## 2018-01-10 PROCEDURE — 99499 UNLISTED E&M SERVICE: CPT | Mod: S$GLB,,, | Performed by: FAMILY MEDICINE

## 2018-01-10 PROCEDURE — 99397 PER PM REEVAL EST PAT 65+ YR: CPT | Mod: S$GLB,,, | Performed by: FAMILY MEDICINE

## 2018-01-10 RX ORDER — HYDROCODONE BITARTRATE AND ACETAMINOPHEN 10; 325 MG/1; MG/1
TABLET ORAL
Status: ON HOLD | COMMUNITY
Start: 2017-12-29 | End: 2018-11-14 | Stop reason: HOSPADM

## 2018-01-10 RX ORDER — AMITRIPTYLINE HYDROCHLORIDE 10 MG/1
10 TABLET, FILM COATED ORAL 2 TIMES DAILY
Qty: 180 TABLET | Refills: 3 | Status: ON HOLD | OUTPATIENT
Start: 2018-01-10 | End: 2018-12-03 | Stop reason: HOSPADM

## 2018-01-10 RX ORDER — VERAPAMIL HYDROCHLORIDE 240 MG/1
240 TABLET, FILM COATED, EXTENDED RELEASE ORAL DAILY
Qty: 90 TABLET | Refills: 3 | Status: ON HOLD | OUTPATIENT
Start: 2018-01-10 | End: 2018-12-03 | Stop reason: HOSPADM

## 2018-01-10 NOTE — PROGRESS NOTES
Subjective:      Patient ID: Apple Watson is a 88 y.o. female.    Chief Complaint: Annual Exam    Wellness; Rx ; goes to [facility]      Review of Systems   Constitutional: Negative.    HENT: Negative.    Respiratory: Negative.    Cardiovascular: Negative.    Gastrointestinal: Negative.    Endocrine: Negative.    Genitourinary: Negative.    Musculoskeletal: Negative.    Psychiatric/Behavioral: Negative.    All other systems reviewed and are negative.    Objective:     Physical Exam   Constitutional: She is oriented to person, place, and time. She appears well-developed and well-nourished.   HENT:   Head: Normocephalic.   Eyes: Conjunctivae and EOM are normal. Pupils are equal, round, and reactive to light.   Neck: Normal range of motion. Neck supple.   Cardiovascular: Normal rate, regular rhythm and normal heart sounds.    Pulmonary/Chest: Effort normal and breath sounds normal.   Musculoskeletal: Normal range of motion.   Neurological: She is alert and oriented to person, place, and time. She has normal reflexes.   Skin: Skin is warm and dry.   Psychiatric: She has a normal mood and affect. Her behavior is normal. Judgment and thought content normal.     Assessment:     1. Wellness examination    2. Arthritis    3. Osteoporosis, unspecified osteoporosis type, unspecified pathological fracture presence    4. Scoliosis of thoracolumbar spine, unspecified scoliosis type    5. Spondylolisthesis, grade 2    6. Aortic calcification    7. Calcification of aorta    8. Essential hypertension    9. Irregular heart beat    10. White coat syndrome with diagnosis of hypertension      Plan:     New Prescriptions    No medications on file     Discontinued Medications    ASCORBIC ACID (VITAMIN C) 500 MG TABLET    Take 500 mg by mouth once daily.     Modified Medications    Modified Medication Previous Medication    AMITRIPTYLINE (ELAVIL) 10 MG TABLET amitriptyline (ELAVIL) 10 MG tablet       Take 1 tablet (10 mg total) by mouth 2  (two) times daily.    Take 1 tablet (10 mg total) by mouth 2 (two) times daily.    VERAPAMIL (CALAN-SR) 240 MG CR TABLET verapamil (CALAN-SR) 240 MG CR tablet       Take 1 tablet (240 mg total) by mouth once daily.    Take 1 tablet (240 mg total) by mouth once daily.       Wellness examination    Arthritis    Osteoporosis, unspecified osteoporosis type, unspecified pathological fracture presence    Scoliosis of thoracolumbar spine, unspecified scoliosis type    Spondylolisthesis, grade 2    Aortic calcification    Calcification of aorta    Essential hypertension    Irregular heart beat    White coat syndrome with diagnosis of hypertension    Other orders  -     verapamil (CALAN-SR) 240 MG CR tablet; Take 1 tablet (240 mg total) by mouth once daily.  Dispense: 90 tablet; Refill: 3  -     amitriptyline (ELAVIL) 10 MG tablet; Take 1 tablet (10 mg total) by mouth 2 (two) times daily.  Dispense: 180 tablet; Refill: 3

## 2018-09-05 ENCOUNTER — TELEPHONE (OUTPATIENT)
Dept: FAMILY MEDICINE | Facility: CLINIC | Age: 83
End: 2018-09-05

## 2018-09-05 NOTE — TELEPHONE ENCOUNTER
I spoke to pt - she had questions about her pain meds that were rx'd by Dr Pete.      Pt advised to give them a call since we can't see his notes here.  She will call back if she can't get it resolved.

## 2018-09-05 NOTE — TELEPHONE ENCOUNTER
----- Message from Sandee Lanza sent at 9/5/2018  9:35 AM CDT -----  Contact: [phone]/self  Patient is requesting a call back. She wants to be seen today or tomorrow to discuss her medication. Thanks

## 2018-11-09 ENCOUNTER — HOSPITAL ENCOUNTER (INPATIENT)
Facility: HOSPITAL | Age: 83
LOS: 6 days | Discharge: NURSING, REHAB OR LONG-TERM CARE | DRG: 470 | End: 2018-11-15
Attending: EMERGENCY MEDICINE | Admitting: INTERNAL MEDICINE
Payer: MEDICARE

## 2018-11-09 ENCOUNTER — ANESTHESIA EVENT (OUTPATIENT)
Dept: SURGERY | Facility: HOSPITAL | Age: 83
DRG: 470 | End: 2018-11-09
Payer: MEDICARE

## 2018-11-09 DIAGNOSIS — S72.414A CLOSED NONDISPLACED FRACTURE OF CONDYLE OF RIGHT FEMUR, INITIAL ENCOUNTER: ICD-10-CM

## 2018-11-09 DIAGNOSIS — I49.9 IRREGULAR HEART BEAT: ICD-10-CM

## 2018-11-09 DIAGNOSIS — M81.0 OSTEOPOROSIS, UNSPECIFIED OSTEOPOROSIS TYPE, UNSPECIFIED PATHOLOGICAL FRACTURE PRESENCE: ICD-10-CM

## 2018-11-09 DIAGNOSIS — I21.4 NSTEMI (NON-ST ELEVATED MYOCARDIAL INFARCTION): ICD-10-CM

## 2018-11-09 DIAGNOSIS — R79.89 ELEVATED TROPONIN: ICD-10-CM

## 2018-11-09 DIAGNOSIS — R00.0 TACHYCARDIA: ICD-10-CM

## 2018-11-09 DIAGNOSIS — N17.9 AKI (ACUTE KIDNEY INJURY): ICD-10-CM

## 2018-11-09 DIAGNOSIS — S72.001A CLOSED FRACTURE OF RIGHT HIP, INITIAL ENCOUNTER: Primary | ICD-10-CM

## 2018-11-09 DIAGNOSIS — S72.91XA CLOSED FRACTURE OF RIGHT FEMUR, UNSPECIFIED FRACTURE MORPHOLOGY, UNSPECIFIED PORTION OF FEMUR, INITIAL ENCOUNTER: ICD-10-CM

## 2018-11-09 DIAGNOSIS — M41.9 SCOLIOSIS OF THORACOLUMBAR SPINE, UNSPECIFIED SCOLIOSIS TYPE: ICD-10-CM

## 2018-11-09 DIAGNOSIS — S72.414D CLOSED NONDISPLACED FRACTURE OF CONDYLE OF RIGHT FEMUR WITH ROUTINE HEALING, SUBSEQUENT ENCOUNTER: ICD-10-CM

## 2018-11-09 DIAGNOSIS — I10 ESSENTIAL HYPERTENSION: ICD-10-CM
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
## 2018-11-09 DIAGNOSIS — S72.001A CLOSED FRACTURE OF NECK OF RIGHT FEMUR, INITIAL ENCOUNTER: ICD-10-CM

## 2018-11-09 PROBLEM — S72.90XA FRACTURE OF FEMUR: Status: ACTIVE | Noted: 2018-11-09

## 2018-11-09 LAB
ALBUMIN SERPL BCP-MCNC: 3.6 G/DL
ALP SERPL-CCNC: 115 U/L
ALT SERPL W/O P-5'-P-CCNC: 12 U/L
ANION GAP SERPL CALC-SCNC: 12 MMOL/L
APTT BLDCRRT: 26.7 SEC
AST SERPL-CCNC: 18 U/L
BASOPHILS # BLD AUTO: 0.01 K/UL
BASOPHILS NFR BLD: 0.1 %
BILIRUB SERPL-MCNC: 0.6 MG/DL
BILIRUB UR QL STRIP: NEGATIVE
BUN SERPL-MCNC: 15 MG/DL
CALCIUM SERPL-MCNC: 8.9 MG/DL
CHLORIDE SERPL-SCNC: 103 MMOL/L
CLARITY UR: CLEAR
CO2 SERPL-SCNC: 22 MMOL/L
COLOR UR: YELLOW
CREAT SERPL-MCNC: 0.7 MG/DL
DIFFERENTIAL METHOD: ABNORMAL
EOSINOPHIL # BLD AUTO: 0 K/UL
EOSINOPHIL NFR BLD: 0.1 %
ERYTHROCYTE [DISTWIDTH] IN BLOOD BY AUTOMATED COUNT: 12.6 %
EST. GFR  (AFRICAN AMERICAN): >60 ML/MIN/1.73 M^2
EST. GFR  (NON AFRICAN AMERICAN): >60 ML/MIN/1.73 M^2
GLUCOSE SERPL-MCNC: 140 MG/DL
GLUCOSE UR QL STRIP: NEGATIVE
HCT VFR BLD AUTO: 40.8 %
HGB BLD-MCNC: 13.2 G/DL
HGB UR QL STRIP: ABNORMAL
INR PPP: 1
KETONES UR QL STRIP: ABNORMAL
LEUKOCYTE ESTERASE UR QL STRIP: NEGATIVE
LYMPHOCYTES # BLD AUTO: 0.7 K/UL
LYMPHOCYTES NFR BLD: 5.8 %
MCH RBC QN AUTO: 29.4 PG
MCHC RBC AUTO-ENTMCNC: 32.4 G/DL
MCV RBC AUTO: 91 FL
MICROSCOPIC COMMENT: NORMAL
MONOCYTES # BLD AUTO: 0.9 K/UL
MONOCYTES NFR BLD: 7.7 %
NEUTROPHILS # BLD AUTO: 10.2 K/UL
NEUTROPHILS NFR BLD: 86.1 %
NITRITE UR QL STRIP: NEGATIVE
PH UR STRIP: 6 [PH] (ref 5–8)
PLATELET # BLD AUTO: 246 K/UL
PMV BLD AUTO: 10 FL
POTASSIUM SERPL-SCNC: 3.6 MMOL/L
PROT SERPL-MCNC: 6.7 G/DL
PROT UR QL STRIP: NEGATIVE
PROTHROMBIN TIME: 10.6 SEC
RBC # BLD AUTO: 4.49 M/UL
RBC #/AREA URNS HPF: 3 /HPF (ref 0–4)
SODIUM SERPL-SCNC: 137 MMOL/L
SP GR UR STRIP: 1.02 (ref 1–1.03)
URN SPEC COLLECT METH UR: ABNORMAL
UROBILINOGEN UR STRIP-ACNC: NEGATIVE EU/DL
WBC # BLD AUTO: 11.83 K/UL
WBC #/AREA URNS HPF: 1 /HPF (ref 0–5)

## 2018-11-09 PROCEDURE — 0SRR01A REPLACEMENT OF RIGHT HIP JOINT, FEMORAL SURFACE WITH METAL SYNTHETIC SUBSTITUTE, UNCEMENTED, OPEN APPROACH: ICD-10-PCS

## 2018-11-09 PROCEDURE — 96375 TX/PRO/DX INJ NEW DRUG ADDON: CPT

## 2018-11-09 PROCEDURE — 85025 COMPLETE CBC W/AUTO DIFF WBC: CPT

## 2018-11-09 PROCEDURE — 85610 PROTHROMBIN TIME: CPT

## 2018-11-09 PROCEDURE — 11000001 HC ACUTE MED/SURG PRIVATE ROOM

## 2018-11-09 PROCEDURE — 93010 ELECTROCARDIOGRAM REPORT: CPT | Mod: ,,, | Performed by: INTERNAL MEDICINE

## 2018-11-09 PROCEDURE — 99285 EMERGENCY DEPT VISIT HI MDM: CPT | Mod: 25

## 2018-11-09 PROCEDURE — 85730 THROMBOPLASTIN TIME PARTIAL: CPT

## 2018-11-09 PROCEDURE — 81000 URINALYSIS NONAUTO W/SCOPE: CPT

## 2018-11-09 PROCEDURE — 63600175 PHARM REV CODE 636 W HCPCS: Performed by: EMERGENCY MEDICINE

## 2018-11-09 PROCEDURE — 25000003 PHARM REV CODE 250

## 2018-11-09 PROCEDURE — 93005 ELECTROCARDIOGRAM TRACING: CPT

## 2018-11-09 PROCEDURE — 80053 COMPREHEN METABOLIC PANEL: CPT

## 2018-11-09 PROCEDURE — 96374 THER/PROPH/DIAG INJ IV PUSH: CPT

## 2018-11-09 RX ORDER — CEFAZOLIN SODIUM 1 G/50ML
2 SOLUTION INTRAVENOUS
Status: COMPLETED | OUTPATIENT
Start: 2018-11-10 | End: 2018-11-10

## 2018-11-09 RX ORDER — HYDROMORPHONE HYDROCHLORIDE 1 MG/ML
0.5 INJECTION, SOLUTION INTRAMUSCULAR; INTRAVENOUS; SUBCUTANEOUS ONCE
Status: COMPLETED | OUTPATIENT
Start: 2018-11-09 | End: 2018-11-09

## 2018-11-09 RX ORDER — SODIUM CHLORIDE, SODIUM LACTATE, POTASSIUM CHLORIDE, CALCIUM CHLORIDE 600; 310; 30; 20 MG/100ML; MG/100ML; MG/100ML; MG/100ML
INJECTION, SOLUTION INTRAVENOUS CONTINUOUS
Status: DISCONTINUED | OUTPATIENT
Start: 2018-11-09 | End: 2018-11-10

## 2018-11-09 RX ORDER — MORPHINE SULFATE 4 MG/ML
4 INJECTION, SOLUTION INTRAMUSCULAR; INTRAVENOUS
Status: COMPLETED | OUTPATIENT
Start: 2018-11-09 | End: 2018-11-09

## 2018-11-09 RX ORDER — TRANEXAMIC ACID 100 MG/ML
1000 INJECTION, SOLUTION INTRAVENOUS
Status: COMPLETED | OUTPATIENT
Start: 2018-11-10 | End: 2018-11-10

## 2018-11-09 RX ADMIN — SODIUM CHLORIDE, SODIUM LACTATE, POTASSIUM CHLORIDE, AND CALCIUM CHLORIDE: .6; .31; .03; .02 INJECTION, SOLUTION INTRAVENOUS at 11:11

## 2018-11-09 RX ADMIN — MORPHINE SULFATE 4 MG: 4 INJECTION INTRAVENOUS at 06:11

## 2018-11-09 RX ADMIN — Medication 0.5 MG: at 09:11

## 2018-11-09 NOTE — ED PROVIDER NOTES
Encounter Date: 11/9/2018    SCRIBE #1 NOTE: I, Grant Carrion, am scribing for, and in the presence of,  Dr. Hansel Aparicio. I have scribed the entire note.       History     Chief Complaint   Patient presents with    Fall     fall from standing landed on right side denies loc or hitting head ambulates with can states right leg just gave out pt son states pt layed on floor for 4 hrs prior to getting to her. pt complain of pain to right hip/thigh pain     Apple Watson is a 89 y.o. female who  has no past medical history on file.    The patient presents to the ED due to fall that occurred prior to arrival. Patient reports her legs gave out and she subsequently fell to the ground. She denies sustaining head contusion or LOC. States she spent 4 hours on the floor and was discovered by her childeren who activated EMS who brought her here for evaluation of right hip and back pain but denies experiencing any other pain, SOB, headache, or dizziness. She was given 200 mcg Fentanyl en route by EMS. Family reports that she has history of Scoliosis and usually uses a cane to get around. Patient lives alone. She does not take Aspirin or blood thinners.           The history is provided by the patient.     Review of patient's allergies indicates:   Allergen Reactions    Celebrex [celecoxib] Other (See Comments)     Blood in bowel    Cymbalta [duloxetine]     Iodine and iodide containing products     Lovastatin     Sulpho-lac [sulfur]     Vioxx [rofecoxib]     Savella [milnacipran] Rash     History reviewed. No pertinent past medical history.  Past Surgical History:   Procedure Laterality Date    APPENDECTOMY      GALLBLADDER SURGERY      HYSTERECTOMY      TONSILLECTOMY       History reviewed. No pertinent family history.  Social History     Tobacco Use    Smoking status: Never Smoker    Smokeless tobacco: Never Used   Substance Use Topics    Alcohol use: No    Drug use: No     Review of Systems   Constitutional:  Negative for chills and fever.   HENT: Negative for congestion, rhinorrhea and sore throat.    Eyes: Negative for redness and visual disturbance.   Respiratory: Negative for cough, shortness of breath and wheezing.    Cardiovascular: Negative for chest pain and palpitations.   Gastrointestinal: Negative for abdominal pain, diarrhea, nausea and vomiting.   Genitourinary: Negative for dysuria and hematuria.   Musculoskeletal: Positive for back pain. Negative for myalgias and neck pain.        Right hip pain.   Skin: Negative for rash.   Neurological: Negative for dizziness, weakness and light-headedness.   Psychiatric/Behavioral: Negative for confusion.   All other systems reviewed and are negative.      Physical Exam     Initial Vitals [11/09/18 1605]   BP Pulse Resp Temp SpO2   129/63 74 18 98 °F (36.7 °C) 95 %      MAP       --         Physical Exam    Nursing note and vitals reviewed.  Constitutional: She appears well-developed and well-nourished. No distress.   HENT:   Head: Normocephalic and atraumatic.   Eyes: Conjunctivae and EOM are normal.   Neck: Normal range of motion. Neck supple.   Cardiovascular: Normal rate, regular rhythm, normal heart sounds and intact distal pulses.   No murmur heard.  Pulmonary/Chest: Breath sounds normal. No respiratory distress. She has no wheezes. She exhibits no tenderness.   Abdominal: Soft. She exhibits no distension. There is no tenderness.   Musculoskeletal: She exhibits tenderness.   Repoducible tenderness to right lateral hip.  She has pain with hip flexion.  Distal pulses intact.  No tenderness over bilateral knee or ankles.  No midline C/T/L spine tenderness.   Neurological: She is alert and oriented to person, place, and time. No sensory deficit. GCS score is 15. GCS eye subscore is 4. GCS verbal subscore is 5. GCS motor subscore is 6.   Skin: Skin is warm and dry. No rash noted. No erythema.         ED Course   Procedures  Labs Reviewed   CBC W/ AUTO DIFFERENTIAL -  Abnormal; Notable for the following components:       Result Value    Gran # (ANC) 10.2 (*)     Lymph # 0.7 (*)     Gran% 86.1 (*)     Lymph% 5.8 (*)     All other components within normal limits   COMPREHENSIVE METABOLIC PANEL - Abnormal; Notable for the following components:    CO2 22 (*)     Glucose 140 (*)     All other components within normal limits   URINALYSIS, REFLEX TO URINE CULTURE - Abnormal; Notable for the following components:    Ketones, UA 1+ (*)     Occult Blood UA 1+ (*)     All other components within normal limits    Narrative:     Preferred Collection Type->Urine, Clean Catch   APTT   PROTIME-INR   URINALYSIS MICROSCOPIC    Narrative:     Preferred Collection Type->Urine, Clean Catch          Imaging Results          X-Ray Chest AP Portable (Final result)  Result time 11/09/18 18:26:22    Final result by Diana Kingston MD (11/09/18 18:26:22)                 Impression:      No acute cardiopulmonary process identified.      Electronically signed by: Diana Kingston MD  Date:    11/09/2018  Time:    18:26             Narrative:    EXAMINATION:  XR CHEST AP PORTABLE    CLINICAL HISTORY:  fall;    TECHNIQUE:  Single frontal view of the chest was performed.    COMPARISON:  None    FINDINGS:  Cardiac silhouette is normal in size.  Lungs are symmetrically expanded.  No evidence of focal consolidative process, pneumothorax, or significant effusion.  No acute osseous abnormality identified.                               X-Ray Femur 2 AP/LAT Right (Final result)  Result time 11/09/18 18:28:22    Final result by Diana Kingston MD (11/09/18 18:28:22)                 Impression:      Acute subcapital right femoral neck fracture.      Electronically signed by: Diana Kingston MD  Date:    11/09/2018  Time:    18:28             Narrative:    EXAMINATION:  XR PELVIS ROUTINE AP; XR FEMUR 2 VIEW RIGHT    CLINICAL HISTORY:  Unspecified fall, initial encounter    TECHNIQUE:  AP view of the pelvis was  performed.  Right femur AP and lateral.    COMPARISON:  None.    FINDINGS:  There is acute subcapital right femoral neck fracture.  There is minimal proximal migration of the distal femoral component.  Femoral head is situated within the acetabulum without evidence of dislocation.  No additional acute pelvic fractures are seen.  No evidence of distal femur fracture.                               X-Ray Pelvis Routine AP (Final result)  Result time 11/09/18 18:28:22    Final result by Diana Kingston MD (11/09/18 18:28:22)                 Impression:      Acute subcapital right femoral neck fracture.      Electronically signed by: Diana Kingston MD  Date:    11/09/2018  Time:    18:28             Narrative:    EXAMINATION:  XR PELVIS ROUTINE AP; XR FEMUR 2 VIEW RIGHT    CLINICAL HISTORY:  Unspecified fall, initial encounter    TECHNIQUE:  AP view of the pelvis was performed.  Right femur AP and lateral.    COMPARISON:  None.    FINDINGS:  There is acute subcapital right femoral neck fracture.  There is minimal proximal migration of the distal femoral component.  Femoral head is situated within the acetabulum without evidence of dislocation.  No additional acute pelvic fractures are seen.  No evidence of distal femur fracture.                               CT Head Without Contrast (Final result)  Result time 11/09/18 17:54:44    Final result by Miguel Danielle MD (11/09/18 17:54:44)                 Impression:      No acute abnormality.      Electronically signed by: Miguel Danielle MD  Date:    11/09/2018  Time:    17:54             Narrative:    EXAMINATION:  CT HEAD WITHOUT CONTRAST    CLINICAL HISTORY:  fall;    TECHNIQUE:  Low dose axial CT images obtained throughout the head without intravenous contrast. Sagittal and coronal reconstructions were performed.    COMPARISON:  None.    FINDINGS:  Intracranial compartment:    Moderate generalized cerebral volume loss.  Scattered hypoattenuation of the  supratentorial white matter consistent with chronic microvascular ischemic changes.  No extra-axial blood or fluid collections.    No parenchymal mass, hemorrhage, edema or major vascular distribution infarct.    Skull/extracranial contents (limited evaluation): No fracture. Small amount of fluid in the left mastoid air cells.  Right mastoid air cells are clear.  Paranasal sinuses are clear.  Small partially calcified lesion superficial to the right parietal calvarium which may represent a complex sebaceous cyst versus osteoma.                                 Medical Decision Making:   Clinical Tests:   Lab Tests: Ordered and Reviewed  Radiological Study: Ordered and Reviewed  Medical Tests: Ordered and Reviewed  ED Management:  6:42 PM  Patient has an acute subcapital right femoral neck fracture and will need to be admitted.    7:05 PM  Discussed case with Orthopedics, Dr. Danielle.    7:21 PM   Spoke to [facility] who will evaluate patient for admission.                        Clinical Impression:     1. Closed fracture of right hip, initial encounter    2. Fall                  I, Dr. Hansel Aparicio, personally performed the services described in this documentation. All medical record entries made by the scribe were at my direction and in my presence.  I have reviewed the chart and agree that the record reflects my personal performance and is accurate and complete                  Hansel Aparicio MD  11/09/18 1947

## 2018-11-10 ENCOUNTER — ANESTHESIA (OUTPATIENT)
Dept: SURGERY | Facility: HOSPITAL | Age: 83
DRG: 470 | End: 2018-11-10
Payer: MEDICARE

## 2018-11-10 PROBLEM — S72.90XA FRACTURE OF FEMUR: Status: RESOLVED | Noted: 2018-11-09 | Resolved: 2018-11-10

## 2018-11-10 LAB
ABO + RH BLD: NORMAL
ANION GAP SERPL CALC-SCNC: 9 MMOL/L
BASOPHILS # BLD AUTO: 0.01 K/UL
BASOPHILS NFR BLD: 0.1 %
BLD GP AB SCN CELLS X3 SERPL QL: NORMAL
BUN SERPL-MCNC: 10 MG/DL
CALCIUM SERPL-MCNC: 9.2 MG/DL
CHLORIDE SERPL-SCNC: 105 MMOL/L
CO2 SERPL-SCNC: 24 MMOL/L
CREAT SERPL-MCNC: 0.6 MG/DL
DIFFERENTIAL METHOD: ABNORMAL
EOSINOPHIL # BLD AUTO: 0 K/UL
EOSINOPHIL NFR BLD: 0 %
ERYTHROCYTE [DISTWIDTH] IN BLOOD BY AUTOMATED COUNT: 12.6 %
EST. GFR  (AFRICAN AMERICAN): >60 ML/MIN/1.73 M^2
EST. GFR  (NON AFRICAN AMERICAN): >60 ML/MIN/1.73 M^2
ESTIMATED AVG GLUCOSE: 97 MG/DL
GLUCOSE SERPL-MCNC: 100 MG/DL
HBA1C MFR BLD HPLC: 5 %
HCT VFR BLD AUTO: 39.6 %
HGB BLD-MCNC: 12.6 G/DL
INR PPP: 1
LYMPHOCYTES # BLD AUTO: 1.4 K/UL
LYMPHOCYTES NFR BLD: 14.3 %
MCH RBC QN AUTO: 28.8 PG
MCHC RBC AUTO-ENTMCNC: 31.8 G/DL
MCV RBC AUTO: 90 FL
MONOCYTES # BLD AUTO: 1.2 K/UL
MONOCYTES NFR BLD: 12.5 %
NEUTROPHILS # BLD AUTO: 7 K/UL
NEUTROPHILS NFR BLD: 72.9 %
PLATELET # BLD AUTO: 231 K/UL
PMV BLD AUTO: 10.1 FL
POTASSIUM SERPL-SCNC: 4.1 MMOL/L
PROTHROMBIN TIME: 10.6 SEC
RBC # BLD AUTO: 4.38 M/UL
SODIUM SERPL-SCNC: 138 MMOL/L
WBC # BLD AUTO: 9.58 K/UL

## 2018-11-10 PROCEDURE — 88305 TISSUE EXAM BY PATHOLOGIST: CPT | Mod: 26,,, | Performed by: PATHOLOGY

## 2018-11-10 PROCEDURE — 25000003 PHARM REV CODE 250

## 2018-11-10 PROCEDURE — 88311 DECALCIFY TISSUE: CPT | Mod: 26,,, | Performed by: PATHOLOGY

## 2018-11-10 PROCEDURE — 63600175 PHARM REV CODE 636 W HCPCS: Performed by: STUDENT IN AN ORGANIZED HEALTH CARE EDUCATION/TRAINING PROGRAM

## 2018-11-10 PROCEDURE — G8978 MOBILITY CURRENT STATUS: HCPCS | Mod: CL

## 2018-11-10 PROCEDURE — 80048 BASIC METABOLIC PNL TOTAL CA: CPT

## 2018-11-10 PROCEDURE — 36000710

## 2018-11-10 PROCEDURE — 27201423 OPTIME MED/SURG SUP & DEVICES STERILE SUPPLY

## 2018-11-10 PROCEDURE — 11000001 HC ACUTE MED/SURG PRIVATE ROOM

## 2018-11-10 PROCEDURE — 85610 PROTHROMBIN TIME: CPT

## 2018-11-10 PROCEDURE — 36000711

## 2018-11-10 PROCEDURE — 88305 TISSUE EXAM BY PATHOLOGIST: CPT | Performed by: PATHOLOGY

## 2018-11-10 PROCEDURE — 88311 DECALCIFY TISSUE: CPT | Performed by: PATHOLOGY

## 2018-11-10 PROCEDURE — 37000009 HC ANESTHESIA EA ADD 15 MINS

## 2018-11-10 PROCEDURE — G8979 MOBILITY GOAL STATUS: HCPCS | Mod: CK

## 2018-11-10 PROCEDURE — 97530 THERAPEUTIC ACTIVITIES: CPT

## 2018-11-10 PROCEDURE — 71000033 HC RECOVERY, INTIAL HOUR

## 2018-11-10 PROCEDURE — 83036 HEMOGLOBIN GLYCOSYLATED A1C: CPT

## 2018-11-10 PROCEDURE — 37000008 HC ANESTHESIA 1ST 15 MINUTES

## 2018-11-10 PROCEDURE — 94799 UNLISTED PULMONARY SVC/PX: CPT

## 2018-11-10 PROCEDURE — 63600175 PHARM REV CODE 636 W HCPCS

## 2018-11-10 PROCEDURE — 86850 RBC ANTIBODY SCREEN: CPT

## 2018-11-10 PROCEDURE — 36415 COLL VENOUS BLD VENIPUNCTURE: CPT

## 2018-11-10 PROCEDURE — 25000003 PHARM REV CODE 250: Performed by: STUDENT IN AN ORGANIZED HEALTH CARE EDUCATION/TRAINING PROGRAM

## 2018-11-10 PROCEDURE — 85025 COMPLETE CBC W/AUTO DIFF WBC: CPT

## 2018-11-10 PROCEDURE — 97161 PT EVAL LOW COMPLEX 20 MIN: CPT

## 2018-11-10 PROCEDURE — 94761 N-INVAS EAR/PLS OXIMETRY MLT: CPT

## 2018-11-10 PROCEDURE — C1776 JOINT DEVICE (IMPLANTABLE): HCPCS

## 2018-11-10 DEVICE — IMPLANTABLE DEVICE: Type: IMPLANTABLE DEVICE | Site: HIP | Status: FUNCTIONAL

## 2018-11-10 DEVICE — SPACER FEM -0 12/14 TAPER END: Type: IMPLANTABLE DEVICE | Site: HIP | Status: FUNCTIONAL

## 2018-11-10 DEVICE — HIP STEM SUMMITT: Type: IMPLANTABLE DEVICE | Site: HIP | Status: FUNCTIONAL

## 2018-11-10 RX ORDER — SODIUM CHLORIDE, SODIUM LACTATE, POTASSIUM CHLORIDE, CALCIUM CHLORIDE 600; 310; 30; 20 MG/100ML; MG/100ML; MG/100ML; MG/100ML
INJECTION, SOLUTION INTRAVENOUS CONTINUOUS
Status: DISCONTINUED | OUTPATIENT
Start: 2018-11-10 | End: 2018-11-11

## 2018-11-10 RX ORDER — BUPIVACAINE HYDROCHLORIDE 2.5 MG/ML
INJECTION, SOLUTION EPIDURAL; INFILTRATION; INTRACAUDAL
Status: DISCONTINUED | OUTPATIENT
Start: 2018-11-10 | End: 2018-11-10

## 2018-11-10 RX ORDER — VERAPAMIL HYDROCHLORIDE 240 MG/1
240 TABLET, FILM COATED, EXTENDED RELEASE ORAL DAILY
Status: DISCONTINUED | OUTPATIENT
Start: 2018-11-10 | End: 2018-11-15 | Stop reason: HOSPADM

## 2018-11-10 RX ORDER — ACETAMINOPHEN 10 MG/ML
INJECTION, SOLUTION INTRAVENOUS
Status: DISCONTINUED | OUTPATIENT
Start: 2018-11-10 | End: 2018-11-10

## 2018-11-10 RX ORDER — SODIUM CHLORIDE 0.9 % (FLUSH) 0.9 %
3 SYRINGE (ML) INJECTION
Status: DISCONTINUED | OUTPATIENT
Start: 2018-11-10 | End: 2018-11-15 | Stop reason: HOSPADM

## 2018-11-10 RX ORDER — HEPARIN SODIUM 5000 [USP'U]/ML
5000 INJECTION, SOLUTION INTRAVENOUS; SUBCUTANEOUS ONCE
Status: DISCONTINUED | OUTPATIENT
Start: 2018-11-10 | End: 2018-11-10

## 2018-11-10 RX ORDER — HYDROMORPHONE HYDROCHLORIDE 2 MG/ML
1 INJECTION, SOLUTION INTRAMUSCULAR; INTRAVENOUS; SUBCUTANEOUS ONCE
Status: COMPLETED | OUTPATIENT
Start: 2018-11-10 | End: 2018-11-10

## 2018-11-10 RX ORDER — RAMELTEON 8 MG/1
8 TABLET ORAL NIGHTLY PRN
Status: DISCONTINUED | OUTPATIENT
Start: 2018-11-10 | End: 2018-11-15 | Stop reason: HOSPADM

## 2018-11-10 RX ORDER — SODIUM CHLORIDE, SODIUM LACTATE, POTASSIUM CHLORIDE, CALCIUM CHLORIDE 600; 310; 30; 20 MG/100ML; MG/100ML; MG/100ML; MG/100ML
INJECTION, SOLUTION INTRAVENOUS CONTINUOUS PRN
Status: DISCONTINUED | OUTPATIENT
Start: 2018-11-10 | End: 2018-11-10

## 2018-11-10 RX ORDER — ENOXAPARIN SODIUM 100 MG/ML
40 INJECTION SUBCUTANEOUS EVERY 24 HOURS
Status: DISCONTINUED | OUTPATIENT
Start: 2018-11-10 | End: 2018-11-13

## 2018-11-10 RX ORDER — SUCCINYLCHOLINE CHLORIDE 20 MG/ML
INJECTION INTRAMUSCULAR; INTRAVENOUS
Status: DISCONTINUED | OUTPATIENT
Start: 2018-11-10 | End: 2018-11-10

## 2018-11-10 RX ORDER — LIDOCAINE HCL/PF 100 MG/5ML
SYRINGE (ML) INTRAVENOUS
Status: DISCONTINUED | OUTPATIENT
Start: 2018-11-10 | End: 2018-11-10

## 2018-11-10 RX ORDER — HYDROMORPHONE HYDROCHLORIDE 2 MG/ML
0.5 INJECTION, SOLUTION INTRAMUSCULAR; INTRAVENOUS; SUBCUTANEOUS EVERY 4 HOURS PRN
Status: DISCONTINUED | OUTPATIENT
Start: 2018-11-10 | End: 2018-11-10

## 2018-11-10 RX ORDER — ACETAMINOPHEN 325 MG/1
650 TABLET ORAL ONCE
Status: COMPLETED | OUTPATIENT
Start: 2018-11-10 | End: 2018-11-10

## 2018-11-10 RX ORDER — SODIUM CHLORIDE 0.9 % (FLUSH) 0.9 %
3 SYRINGE (ML) INJECTION
Status: DISCONTINUED | OUTPATIENT
Start: 2018-11-10 | End: 2018-11-10

## 2018-11-10 RX ORDER — ROCURONIUM BROMIDE 10 MG/ML
INJECTION, SOLUTION INTRAVENOUS
Status: DISCONTINUED | OUTPATIENT
Start: 2018-11-10 | End: 2018-11-10

## 2018-11-10 RX ORDER — TRAMADOL HYDROCHLORIDE 50 MG/1
50 TABLET ORAL EVERY 6 HOURS PRN
Status: DISCONTINUED | OUTPATIENT
Start: 2018-11-10 | End: 2018-11-15 | Stop reason: HOSPADM

## 2018-11-10 RX ORDER — ONDANSETRON 2 MG/ML
INJECTION INTRAMUSCULAR; INTRAVENOUS
Status: DISCONTINUED | OUTPATIENT
Start: 2018-11-10 | End: 2018-11-10

## 2018-11-10 RX ORDER — EPHEDRINE SULFATE 50 MG/ML
INJECTION, SOLUTION INTRAVENOUS
Status: DISCONTINUED | OUTPATIENT
Start: 2018-11-10 | End: 2018-11-10

## 2018-11-10 RX ORDER — HYDROMORPHONE HYDROCHLORIDE 2 MG/ML
0.5 INJECTION, SOLUTION INTRAMUSCULAR; INTRAVENOUS; SUBCUTANEOUS
Status: DISCONTINUED | OUTPATIENT
Start: 2018-11-10 | End: 2018-11-14

## 2018-11-10 RX ORDER — PROPOFOL 10 MG/ML
VIAL (ML) INTRAVENOUS
Status: DISCONTINUED | OUTPATIENT
Start: 2018-11-10 | End: 2018-11-10

## 2018-11-10 RX ORDER — AMOXICILLIN 250 MG
1 CAPSULE ORAL 2 TIMES DAILY
Status: DISCONTINUED | OUTPATIENT
Start: 2018-11-10 | End: 2018-11-15 | Stop reason: HOSPADM

## 2018-11-10 RX ORDER — POLYETHYLENE GLYCOL 3350 17 G/17G
17 POWDER, FOR SOLUTION ORAL DAILY
Status: DISCONTINUED | OUTPATIENT
Start: 2018-11-10 | End: 2018-11-15 | Stop reason: HOSPADM

## 2018-11-10 RX ORDER — HYDROMORPHONE HYDROCHLORIDE 2 MG/ML
0.2 INJECTION, SOLUTION INTRAMUSCULAR; INTRAVENOUS; SUBCUTANEOUS EVERY 5 MIN PRN
Status: DISCONTINUED | OUTPATIENT
Start: 2018-11-10 | End: 2018-11-10

## 2018-11-10 RX ORDER — CEFAZOLIN SODIUM 2 G/50ML
2 SOLUTION INTRAVENOUS
Status: COMPLETED | OUTPATIENT
Start: 2018-11-10 | End: 2018-11-11

## 2018-11-10 RX ORDER — HYDROMORPHONE HYDROCHLORIDE 2 MG/ML
0.2 INJECTION, SOLUTION INTRAMUSCULAR; INTRAVENOUS; SUBCUTANEOUS
Status: DISCONTINUED | OUTPATIENT
Start: 2018-11-10 | End: 2018-11-10

## 2018-11-10 RX ORDER — MUPIROCIN 20 MG/G
1 OINTMENT TOPICAL 2 TIMES DAILY
Status: COMPLETED | OUTPATIENT
Start: 2018-11-10 | End: 2018-11-14

## 2018-11-10 RX ORDER — ACETAMINOPHEN 325 MG/1
650 TABLET ORAL EVERY 4 HOURS PRN
Status: DISCONTINUED | OUTPATIENT
Start: 2018-11-10 | End: 2018-11-13

## 2018-11-10 RX ORDER — AMITRIPTYLINE HYDROCHLORIDE 10 MG/1
10 TABLET, FILM COATED ORAL 2 TIMES DAILY
Status: DISCONTINUED | OUTPATIENT
Start: 2018-11-10 | End: 2018-11-11

## 2018-11-10 RX ORDER — ONDANSETRON 2 MG/ML
4 INJECTION INTRAMUSCULAR; INTRAVENOUS EVERY 6 HOURS PRN
Status: DISCONTINUED | OUTPATIENT
Start: 2018-11-10 | End: 2018-11-15 | Stop reason: HOSPADM

## 2018-11-10 RX ORDER — ENOXAPARIN SODIUM 100 MG/ML
30 INJECTION SUBCUTANEOUS EVERY 24 HOURS
Status: DISCONTINUED | OUTPATIENT
Start: 2018-11-10 | End: 2018-11-10

## 2018-11-10 RX ORDER — FENTANYL CITRATE 50 UG/ML
INJECTION, SOLUTION INTRAMUSCULAR; INTRAVENOUS
Status: DISCONTINUED | OUTPATIENT
Start: 2018-11-10 | End: 2018-11-10

## 2018-11-10 RX ORDER — ONDANSETRON 2 MG/ML
4 INJECTION INTRAMUSCULAR; INTRAVENOUS DAILY PRN
Status: DISCONTINUED | OUTPATIENT
Start: 2018-11-10 | End: 2018-11-10

## 2018-11-10 RX ORDER — RAMELTEON 8 MG/1
8 TABLET ORAL NIGHTLY PRN
Status: DISCONTINUED | OUTPATIENT
Start: 2018-11-10 | End: 2018-11-10

## 2018-11-10 RX ADMIN — SODIUM CHLORIDE, SODIUM LACTATE, POTASSIUM CHLORIDE, AND CALCIUM CHLORIDE: .6; .31; .03; .02 INJECTION, SOLUTION INTRAVENOUS at 07:11

## 2018-11-10 RX ADMIN — FENTANYL CITRATE 50 MCG: 50 INJECTION, SOLUTION INTRAMUSCULAR; INTRAVENOUS at 09:11

## 2018-11-10 RX ADMIN — SODIUM CHLORIDE, SODIUM LACTATE, POTASSIUM CHLORIDE, AND CALCIUM CHLORIDE: .6; .31; .03; .02 INJECTION, SOLUTION INTRAVENOUS at 11:11

## 2018-11-10 RX ADMIN — HYDROMORPHONE HYDROCHLORIDE 1 MG: 2 INJECTION INTRAMUSCULAR; INTRAVENOUS; SUBCUTANEOUS at 12:11

## 2018-11-10 RX ADMIN — MUPIROCIN 1 G: 20 OINTMENT TOPICAL at 10:11

## 2018-11-10 RX ADMIN — AMITRIPTYLINE HYDROCHLORIDE 10 MG: 10 TABLET, FILM COATED ORAL at 08:11

## 2018-11-10 RX ADMIN — LIDOCAINE HYDROCHLORIDE 100 MG: 20 INJECTION, SOLUTION INTRAVENOUS at 07:11

## 2018-11-10 RX ADMIN — EPHEDRINE SULFATE 15 MG: 50 INJECTION, SOLUTION INTRAMUSCULAR; INTRAVENOUS; SUBCUTANEOUS at 07:11

## 2018-11-10 RX ADMIN — SUCCINYLCHOLINE CHLORIDE 120 MG: 20 INJECTION, SOLUTION INTRAMUSCULAR; INTRAVENOUS at 07:11

## 2018-11-10 RX ADMIN — PROPOFOL 120 MG: 10 INJECTION, EMULSION INTRAVENOUS at 07:11

## 2018-11-10 RX ADMIN — ROCURONIUM BROMIDE 5 MG: 10 INJECTION, SOLUTION INTRAVENOUS at 07:11

## 2018-11-10 RX ADMIN — EPHEDRINE SULFATE 10 MG: 50 INJECTION, SOLUTION INTRAMUSCULAR; INTRAVENOUS; SUBCUTANEOUS at 09:11

## 2018-11-10 RX ADMIN — TRAMADOL HYDROCHLORIDE 50 MG: 50 TABLET, COATED ORAL at 11:11

## 2018-11-10 RX ADMIN — ONDANSETRON 4 MG: 2 INJECTION, SOLUTION INTRAMUSCULAR; INTRAVENOUS at 09:11

## 2018-11-10 RX ADMIN — CEFAZOLIN SODIUM 2 G: 2 SOLUTION INTRAVENOUS at 03:11

## 2018-11-10 RX ADMIN — HYDROMORPHONE HYDROCHLORIDE 0.5 MG: 2 INJECTION INTRAMUSCULAR; INTRAVENOUS; SUBCUTANEOUS at 10:11

## 2018-11-10 RX ADMIN — SODIUM CHLORIDE, SODIUM LACTATE, POTASSIUM CHLORIDE, AND CALCIUM CHLORIDE: .6; .31; .03; .02 INJECTION, SOLUTION INTRAVENOUS at 10:11

## 2018-11-10 RX ADMIN — HYDROMORPHONE HYDROCHLORIDE 0.2 MG: 2 INJECTION INTRAMUSCULAR; INTRAVENOUS; SUBCUTANEOUS at 03:11

## 2018-11-10 RX ADMIN — VERAPAMIL HYDROCHLORIDE 240 MG: 240 TABLET, FILM COATED, EXTENDED RELEASE ORAL at 09:11

## 2018-11-10 RX ADMIN — FENTANYL CITRATE 50 MCG: 50 INJECTION, SOLUTION INTRAMUSCULAR; INTRAVENOUS at 08:11

## 2018-11-10 RX ADMIN — ACETAMINOPHEN 1000 MG: 10 INJECTION, SOLUTION INTRAVENOUS at 08:11

## 2018-11-10 RX ADMIN — MUPIROCIN 1 G: 20 OINTMENT TOPICAL at 08:11

## 2018-11-10 RX ADMIN — ACETAMINOPHEN 650 MG: 325 TABLET ORAL at 12:11

## 2018-11-10 RX ADMIN — CEFAZOLIN SODIUM 2 G: 1 SOLUTION INTRAVENOUS at 07:11

## 2018-11-10 RX ADMIN — HYDROMORPHONE HYDROCHLORIDE 0.5 MG: 2 INJECTION INTRAMUSCULAR; INTRAVENOUS; SUBCUTANEOUS at 05:11

## 2018-11-10 RX ADMIN — ENOXAPARIN SODIUM 40 MG: 100 INJECTION SUBCUTANEOUS at 05:11

## 2018-11-10 RX ADMIN — CEFAZOLIN SODIUM 2 G: 2 SOLUTION INTRAVENOUS at 11:11

## 2018-11-10 RX ADMIN — HYDROMORPHONE HYDROCHLORIDE 0.5 MG: 2 INJECTION INTRAMUSCULAR; INTRAVENOUS; SUBCUTANEOUS at 08:11

## 2018-11-10 RX ADMIN — FENTANYL CITRATE 150 MCG: 50 INJECTION, SOLUTION INTRAMUSCULAR; INTRAVENOUS at 07:11

## 2018-11-10 RX ADMIN — TRANEXAMIC ACID 1000 MG: 100 INJECTION, SOLUTION INTRAVENOUS at 07:11

## 2018-11-10 NOTE — H&P
[facility] Internal Medicine History and Physical - Resident Note    Admitting Team: [facility] Internal Medicine  Attending Physician: Dr. Murillo  Resident: Dr. Kenyon  Interns: Dr. Motta    Date of Admit: 11/9/2018    Chief Complaint     Fall (fall from standing landed on right side denies loc or hitting head ambulates with can states right leg just gave out pt son states pt layed on floor for 4 hrs prior to getting to her. pt complain of pain to right hip/thigh pain)   for 1 day    Subjective:      History of Present Illness:  Apple Watson is a 89 y.o.  female who  has no past medical history on file.. The patient presented to [facility] on 11/9/2018 with a primary complaint of Fall (fall from standing landed on right side denies loc or hitting head ambulates with can states right leg just gave out pt son states pt layed on floor for 4 hrs prior to getting to her. pt complain of pain to right hip/thigh pain)      The patient was in their usual state of health (able to complete all her ADLs independently and is able to do light gardening/yard work) until earlier this afternoon, when she states that she took a hard step and felt her right leg give out. She subsequently fell and was unable to get up. She normally ambulates around her single story home (with a sunken living room) with assistance of a cane on occasion. She denies LOC, hitting her head, or any other injury. She states that she takes norco 10 regularly for severe scoliosis, but that this does not limited her ability to ambulate.    Denies any recent fevers, chills, nausea, vomiting, diarrhea, constipation, dysuria. She did not experience any bowel or bladder incontinence with this fall, denies any numbness or tingling in her right leg.     Past Medical History:  History reviewed. No pertinent past medical history.    Past Surgical History:  Past Surgical History:   Procedure Laterality Date    APPENDECTOMY      GALLBLADDER SURGERY       "HYSTERECTOMY      TONSILLECTOMY         Allergies:  Review of patient's allergies indicates:   Allergen Reactions    Celebrex [celecoxib] Other (See Comments)     Blood in bowel    Cymbalta [duloxetine]     Iodine and iodide containing products     Lovastatin     Sulpho-lac [sulfur]     Vioxx [rofecoxib]     Savella [milnacipran] Rash       Home Medications:  Prior to Admission medications    Medication Sig Start Date End Date Taking? Authorizing Provider   amitriptyline (ELAVIL) 10 MG tablet Take 1 tablet (10 mg total) by mouth 2 (two) times daily. 1/10/18   Tree Rolon MD   cyanocobalamin (VITAMIN B-12) 250 MCG tablet Take 250 mcg by mouth once daily.    Historical Provider, MD   ergocalciferol (VITAMIN D2) 50,000 unit Cap Take 50,000 Units by mouth every 7 days.    Historical Provider, MD   gabapentin (NEURONTIN) 300 MG capsule Take 1 capsule (300 mg total) by mouth every evening. 17  Yakov Dickens III, MD   hydrocodone-acetaminophen 10-325mg (NORCO)  mg Tab  17   Historical Provider, MD   verapamil (CALAN-SR) 240 MG CR tablet Take 1 tablet (240 mg total) by mouth once daily. 1/10/18   Tree Rolon MD   vitamin A 8000 UNIT capsule Take 8,000 Units by mouth once daily.    Historical Provider, MD       Family History:  History reviewed. No pertinent family history.    Social History:  Social History     Tobacco Use    Smoking status: Never Smoker    Smokeless tobacco: Never Used   Substance Use Topics    Alcohol use: No    Drug use: No       Review of Systems:  Pertinent items are noted in HPI. All other systems are reviewed and are negative.     Objective:   Last 24 Hour Vital Signs:  BP  Min: 129/63  Max: 186/93  Temp  Av °F (36.7 °C)  Min: 98 °F (36.7 °C)  Max: 98 °F (36.7 °C)  Pulse  Av.2  Min: 72  Max: 102  Resp  Av.5  Min: 18  Max: 22  SpO2  Av.3 %  Min: 95 %  Max: 98 %  Height  Av' 10" (147.3 cm)  Min: 4' 10" (147.3 cm)  Max: 4' 10" " (147.3 cm)  Weight  Av.5 kg (128 lb 15.5 oz)  Min: 58.5 kg (128 lb 15.5 oz)  Max: 58.5 kg (128 lb 15.5 oz)  Body mass index is 26.95 kg/m².  No intake/output data recorded.    Physical Examination:  General appearance: alert, appears stated age and cooperative  Head: Normocephalic, without obvious abnormality, atraumatic  Eyes: conjunctivae/corneas clear. PERRL, EOM's intact. Fundi benign.  Throat: lips, mucosa, and tongue normal; teeth and gums normal  Lungs: clear to auscultation bilaterally  Heart: regular rate and rhythm, S1, S2 normal, no murmur, click, rub or gallop  Abdomen: soft, non-tender; bowel sounds normal; no masses,  no organomegaly  Extremities: extremities normal, atraumatic, no cyanosis or edema, right hip tender to light palpation or to any manipulation of right leg  Pulses: 2+ and symmetric  Skin: Skin color, texture, turgor normal. No rashes or lesions  Neurologic: Grossly normal      Laboratory:  Most Recent Data:  CBC:   Lab Results   Component Value Date    WBC 11.83 2018    HGB 13.2 2018    HCT 40.8 2018     2018    MCV 91 2018    RDW 12.6 2018     BMP:   Lab Results   Component Value Date     2018    K 3.6 2018     2018    CO2 22 (L) 2018    BUN 15 2018    CREATININE 0.7 2018     (H) 2018    CALCIUM 8.9 2018     LFTs:   Lab Results   Component Value Date    PROT 6.7 2018    ALBUMIN 3.6 2018    BILITOT 0.6 2018    AST 18 2018    ALKPHOS 115 2018    ALT 12 2018     Coags:   Lab Results   Component Value Date    INR 1.0 2018     DM:   Lab Results   Component Value Date    CREATININE 0.7 2018     Urinalysis:   Lab Results   Component Value Date    COLORU Yellow 2018    SPECGRAV 1.025 2018    NITRITE Negative 2018    KETONESU 1+ (A) 2018    UROBILINOGEN Negative 2018    WBCUA 1 2018       Trended Lab  Data:  Recent Labs   Lab 11/09/18  1628   WBC 11.83   HGB 13.2   HCT 40.8      MCV 91   RDW 12.6      K 3.6      CO2 22*   BUN 15   CREATININE 0.7   *   PROT 6.7   ALBUMIN 3.6   BILITOT 0.6   AST 18   ALKPHOS 115   ALT 12       Other Results:    Radiology:  Imaging Results          X-Ray Chest AP Portable (Final result)  Result time 11/09/18 18:26:22    Final result by Diana Kingston MD (11/09/18 18:26:22)                 Impression:      No acute cardiopulmonary process identified.      Electronically signed by: Diana Kingston MD  Date:    11/09/2018  Time:    18:26             Narrative:    EXAMINATION:  XR CHEST AP PORTABLE    CLINICAL HISTORY:  fall;    TECHNIQUE:  Single frontal view of the chest was performed.    COMPARISON:  None    FINDINGS:  Cardiac silhouette is normal in size.  Lungs are symmetrically expanded.  No evidence of focal consolidative process, pneumothorax, or significant effusion.  No acute osseous abnormality identified.                               X-Ray Femur 2 AP/LAT Right (Final result)  Result time 11/09/18 18:28:22    Final result by Diana Kingston MD (11/09/18 18:28:22)                 Impression:      Acute subcapital right femoral neck fracture.      Electronically signed by: Diana Kingston MD  Date:    11/09/2018  Time:    18:28             Narrative:    EXAMINATION:  XR PELVIS ROUTINE AP; XR FEMUR 2 VIEW RIGHT    CLINICAL HISTORY:  Unspecified fall, initial encounter    TECHNIQUE:  AP view of the pelvis was performed.  Right femur AP and lateral.    COMPARISON:  None.    FINDINGS:  There is acute subcapital right femoral neck fracture.  There is minimal proximal migration of the distal femoral component.  Femoral head is situated within the acetabulum without evidence of dislocation.  No additional acute pelvic fractures are seen.  No evidence of distal femur fracture.                               X-Ray Pelvis Routine AP (Final result)  Result  time 11/09/18 18:28:22    Final result by Diana Kingston MD (11/09/18 18:28:22)                 Impression:      Acute subcapital right femoral neck fracture.      Electronically signed by: Diana Kingston MD  Date:    11/09/2018  Time:    18:28             Narrative:    EXAMINATION:  XR PELVIS ROUTINE AP; XR FEMUR 2 VIEW RIGHT    CLINICAL HISTORY:  Unspecified fall, initial encounter    TECHNIQUE:  AP view of the pelvis was performed.  Right femur AP and lateral.    COMPARISON:  None.    FINDINGS:  There is acute subcapital right femoral neck fracture.  There is minimal proximal migration of the distal femoral component.  Femoral head is situated within the acetabulum without evidence of dislocation.  No additional acute pelvic fractures are seen.  No evidence of distal femur fracture.                               CT Head Without Contrast (Final result)  Result time 11/09/18 17:54:44    Final result by Miguel Danielle MD (11/09/18 17:54:44)                 Impression:      No acute abnormality.      Electronically signed by: Miguel Danielle MD  Date:    11/09/2018  Time:    17:54             Narrative:    EXAMINATION:  CT HEAD WITHOUT CONTRAST    CLINICAL HISTORY:  fall;    TECHNIQUE:  Low dose axial CT images obtained throughout the head without intravenous contrast. Sagittal and coronal reconstructions were performed.    COMPARISON:  None.    FINDINGS:  Intracranial compartment:    Moderate generalized cerebral volume loss.  Scattered hypoattenuation of the supratentorial white matter consistent with chronic microvascular ischemic changes.  No extra-axial blood or fluid collections.    No parenchymal mass, hemorrhage, edema or major vascular distribution infarct.    Skull/extracranial contents (limited evaluation): No fracture. Small amount of fluid in the left mastoid air cells.  Right mastoid air cells are clear.  Paranasal sinuses are clear.  Small partially calcified lesion superficial to the right  parietal calvarium which may represent a complex sebaceous cyst versus osteoma.                                 Assessment:     Apple Watson is a 89 y.o. female with:  Active Hospital Problems    Diagnosis  POA    *Fracture of femur [S72.90XA]  Yes    Closed fracture of right hip [S72.001A]  Yes    Irregular heart beat [I49.9]  Yes    Scoliosis [M41.9]  Yes      Resolved Hospital Problems   No resolved problems to display.        Plan:     Femur fracture  - fracture as above, Orthopedic surgery consulted  - will give dilaudid & tylenol for pain control  - hold heparin this morning  - to OR with [facility]  - Noted to have osteoporosis in chart, but no imaging available  - Calcium & Vit D supplementation in setting of femur fracture    Irregular heart beat  - not appreciated on my exam, will continue home verapamil  - monitor    Scoliosis  - Stable, will continue pain management as above, holding home medications    HCM  - Will check HgA1c    F: None  E: monitor, wnl  N: NPO at midnight  Ppx: heparin, holding morning dose  Dispo: to OR w [facility] 11/10/18    Code Status:     FULL    Nkechi Kenyon MD  [facility] Internal Medicine/Emergency Medicine, HO-II  [facility] Internal Medicine Service    [facility] Medicine Hospitalist Pager numbers:   [facility] Hospitalist Medicine Team A (Lidia/Maryanne): [phone]  [facility] Hospitalist Medicine Team B (Светлана/Wayne):  [phone]

## 2018-11-10 NOTE — ANESTHESIA PREPROCEDURE EVALUATION
11/09/2018  Apple Watson is a 89 y.o., female.    Anesthesia Evaluation      I have reviewed the Medications.     Review of Systems  Anesthesia Hx:  No problems with previous Anesthesia Denies Hx of Anesthetic complications History of prior surgery of interest to airway management or planning: Previous anesthesia: General Denies Family Hx of Anesthesia complications.   Denies Personal Hx of Anesthesia complications.   Social:  No Alcohol Use, Non-Smoker    Hematology/Oncology:  Hematology Normal   Oncology Normal     EENT/Dental:EENT/Dental Normal   Cardiovascular:   Exercise tolerance: good Hypertension    Pulmonary:  Pulmonary Normal    Renal/:  Renal/ Normal     Hepatic/GI:  Hepatic/GI Normal    Musculoskeletal:  Musculoskeletal Normal    Neurological:  Neurology Normal    Endocrine:  Endocrine Normal    Dermatological:  Skin Normal    Psych:  Psychiatric Normal           Physical Exam  General:  Well nourished    Airway/Jaw/Neck:  Airway Findings: Mouth Opening: Normal Tongue: Normal  General Airway Assessment: Adult  Mallampati: II      Dental:  Dental Findings: In tact   Chest/Lungs:  Chest/Lungs Findings: Clear to auscultation, Normal Respiratory Rate     Heart/Vascular:  Heart Findings: Rate: Normal  Rhythm: Regular Rhythm        Mental Status:  Mental Status Findings:  Cooperative, Alert and Oriented       Normal sinus rhythm with PACs  Rightward axis  Anteroseptal infarct (cited on or before 09-NOV-2018)  Abnormal ECG  When compared with ECG of 01-MAR-2017 11:45,  Criteria for Inferior infarct are no longer Present  Serial changes of evolving Anteroseptal infarct Present  Confirmed by RAMONA GARDNER, JENSEN ([id number]) on 11/9/2018 9:10:37 PM      Anesthesia Plan  Type of Anesthesia, risks & benefits discussed:  Anesthesia Type:  general  Patient's Preference: general  Intra-op Monitoring Plan:    Intra-op Monitoring Plan Comments:   Post Op Pain Control Plan:   Post Op Pain Control Plan Comments:   Induction:   IV  Beta Blocker:  Patient is not currently on a Beta-Blocker (No further documentation required).       Informed Consent: Patient understands risks and agrees with Anesthesia plan.  Questions answered. Anesthesia consent signed with patient.  ASA Score: 3     Day of Surgery Review of History & Physical:            Ready For Surgery From Anesthesia Perspective.

## 2018-11-10 NOTE — PROGRESS NOTES
Pt back from procedure, AAOx4, VSS, NAD noted, no complaints of n/v/d or pain, IV fluids infusing, family at bedside, bed alarm active, safety has been maintained, will continue to monitor.

## 2018-11-10 NOTE — BRIEF OP NOTE
[facility]  Brief Operative Note    SUMMARY     Surgery Date: 11/10/2018     Surgeon(s) and Role:     * Nasir Danielle MD - Primary    Assisting Surgeon: None    Pre-op Diagnosis:  Closed fracture of right hip, initial encounter [S72.001A]    Post-op Diagnosis:  Post-Op Diagnosis Codes:     * Closed fracture of right hip, initial encounter [S72.001A]    Procedure(s) (LRB):  ARTHROPLASTY-HIP-FREDDIE (Right)    Anesthesia: General    Description of Procedure: Rt hip freddie-arthroplasty, and exposure    Description of the findings of the procedure: base of femoral neck fx    Estimated Blood Loss:100ml           Specimens:   Specimen (12h ago, onward)    Start     Ordered    11/10/18 0849  Specimen to Pathology - Surgery  Once     Comments:  Pre-op Diagnosis: Closed fracture of right hip, initial encounter [S72.001A]Post-op Diagnosis: SameProcedure(s):ARTHROPLASTY-HIP-FREDDIE Number of specimens: 1Name of specimens: 1. Right femoral head     Start Status   11/10/18 0849 Collected (11/10/18 0932)       11/10/18 0931

## 2018-11-10 NOTE — TRANSFER OF CARE
"Anesthesia Transfer of Care Note    Patient: Apple Watson    Procedure(s) Performed: Procedure(s) (LRB):  ARTHROPLASTY-HIP-NOLBERTO (Right)    Patient location: [facility] Surgical Floor    Anesthesia Type: general    Transport from OR: Transported from OR on 6-10 L/min O2 by face mask with adequate spontaneous ventilation    Post pain: adequate analgesia    Post assessment: no apparent anesthetic complications    Post vital signs: stable    Level of consciousness: awake and alert    Nausea/Vomiting: no nausea/vomiting    Complications: none    Transfer of care protocol was followed      Last vitals:   Visit Vitals  BP (!) 142/92 (BP Location: Right arm, Patient Position: Lying)   Pulse 81   Temp 36.5 °C (97.7 °F) (Oral)   Resp 17   Ht 4' 10" (1.473 m)   Wt 58.5 kg (128 lb 15.5 oz)   SpO2 98%   Breastfeeding? No   BMI 26.95 kg/m²     "

## 2018-11-10 NOTE — PLAN OF CARE
Problem: Physical Therapy Goal  Goal: Physical Therapy Goal  Goals to be met by: 11/30/18     Patient will increase functional independence with mobility by performing:    Supine <> sit spvn  Sit<>stand spvn  Pt will ambulate with  feet with spvn  Pt will perform LE TE x 15 reps in supine and sitting  Pt will ascend and descend 1 step with RW and CGA    Outcome: Ongoing (interventions implemented as appropriate)  Initial evaluation completed, PT to follow. Rec HHPT with 24 hour family supervision OR SNF if she does not have the family supervision available.

## 2018-11-10 NOTE — PLAN OF CARE
VSS. Awake and alert. Denies pain or discomfort @ this time. No changes noted. Family updated. Cont to monitor.

## 2018-11-10 NOTE — PT/OT/SLP EVAL
Physical Therapy Evaluation    Patient Name:  Apple Watson   MRN:  [id number]    Recommendations:     Discharge Recommendations:  nursing facility, skilled, home health OT, home health PT(HH PT/OT, ONLY if she has 24/7 supervision at home)   Discharge Equipment Recommendations: walker, rolling, bath bench   Barriers to discharge: Decreased caregiver support    Assessment:     Apple Watson is a 89 y.o. female admitted with a medical diagnosis of Closed fracture of neck of right femur.  She presents with the following impairments/functional limitations:  weakness, impaired functional mobilty, impaired endurance, gait instability, impaired balance, impaired self care skills, decreased lower extremity function, decreased ROM, orthopedic precautions, impaired skin, decreased upper extremity function, pain . Patient was functionally independent at home alone, had stopped driving 3 months prior due to having to take pain meds for sciatic pain / scoliosis. Now s/p R hip hemiarthroplasty with WB restrictions and pain limiting function. Will need 24/7 supervision at home with HH PT/OT or SNF if she does not have the supervisionl. .    Rehab Prognosis:  excellent; patient would benefit from acute skilled PT services to address these deficits and reach maximum level of function.      Recent Surgery: Procedure(s) (LRB):  ARTHROPLASTY-HIP-NOLBERTO (Right) Day of Surgery    Plan:     During this hospitalization, patient to be seen daily to address the above listed problems via gait training, therapeutic activities, therapeutic exercises  · Plan of Care Expires:  11/30/18   Plan of Care Reviewed with: patient(daughter in law)    Subjective     Communicated with RN prior to session.  Patient found supine upon PT entry to room, agreeable to evaluation.      Chief Complaint: felt like she had to urinate even though she knew she had a catheter in  Patient comments/goals: Pt provided info regarding PLOF with son/DIL present to  "validate.  States she still keeps up her home and her yard and like to cook "real" Serbian food  Pain/Comfort:  · Pain Rating 1: 5/10  · Location - Side 1: Right  · Location - Orientation 1: lower  · Location 1: back  · Pain Addressed 1: Pre-medicate for activity, Reposition, Nurse notified  · Pain Rating Post-Intervention 1: ("just a little twinge", although did have slight increase in pain with WB through RLE)    Patients cultural, spiritual, Roman Catholic conflicts given the current situation: none verbalized    Living Environment:  Lives alone in a single story home with a sunken den. Must go through den to get to rest of her home. Has tub/shower combo with shower chair.  Standard toilet  Prior to admission, patients level of function was independent.  Patient has the following equipment: shower chair.  DME owned (not currently used): none.  Upon discharge, patient will have assistance from either SNF staff of 24/7 supervision of family, still needs to be determined. .    Objective:     Patient found with: bed alarm, SCD, peripheral IV, granda catheter     General Precautions: Standard, fall   Orthopedic Precautions:RLE weight bearing as tolerated   Braces: N/A     Exams:  · Cognitive Exam:  Patient is oriented to Person, Place, Time and Situation  · Gross Motor Coordination:  WFL  · Postural Exam:  Patient presented with the following abnormalities:    · -       Rounded shoulders  · -       Forward head  · -       Scoliosis  · Skin Integrity/Edema:      · -       Surgical incision covered with dressing R hip  · RLE ROM: ankles WFL 4+/5 grossly 50% active ROM for hip flexion, limited by post op pain  · RLE Strength: limited by post op pain  · LLE ROM: WFL  · LLE Strength: WFL    Functional Mobility:  · Bed Mobility:     · Scooting: minimum assistance  · Supine to Sit: moderate assistance with assist at R LE and trunk. Antalgic WB in sitting with pressure on R hip.   · Sit to Supine: moderate assistance with assist " at RLE. Able to bridge with assist for midline positioning in bed  · Transfers:     · Sit to Stand:  minimum assistance with rolling walker with continuous cueing for hand placement and keeping R knee below level of hip to maintain precautions  · Gait: peformed 4 side steps along edge of bed with RW, min A, increased pain wtih WB on R, initially only able to swivel on L foot, but then progressed to be able to unweight L foot entirely for side step  · Balance: fair with use of RW for minimal side stepping    AM-PAC 6 CLICK MOBILITY  Total Score:13       Therapeutic Activities and Exercises:   as detailed above  Discussion of hemiarthroplasty precautions, no crossing legs, no bending over  Discussion of post acute placement options.    Patient left HOB elevated with all lines intact, call button in reach, bed alarm on and daughter in law present.    GOALS:   Multidisciplinary Problems     Physical Therapy Goals        Problem: Physical Therapy Goal    Goal Priority Disciplines Outcome Goal Variances Interventions   Physical Therapy Goal     PT, PT/OT Ongoing (interventions implemented as appropriate)     Description:  Goals to be met by: 11/30/18     Patient will increase functional independence with mobility by performing:    Supine <> sit spvn  Sit<>stand spvn  Pt will ambulate with  feet with spvn  Pt will perform LE TE x 15 reps in supine and sitting  Pt will ascend and descend 1 step with RW and CGA                      History:     History reviewed. No pertinent past medical history.    Past Surgical History:   Procedure Laterality Date    APPENDECTOMY      GALLBLADDER SURGERY      HYSTERECTOMY      TONSILLECTOMY         Clinical Decision Making:     History  Co-morbidities and personal factors that may impact the plan of care Examination  Body Structures and Functions, activity limitations and participation restrictions that may impact the plan of care Clinical Presentation   Decision Making/  Complexity Score   Co-morbidities:   [] Time since onset of injury / illness / exacerbation  [x] Status of current condition  []Patient's cognitive status and safety concerns    [] Multiple Medical Problems (see med hx)  Personal Factors:   [] Patient's age  [] Prior Level of function   [x] Patient's home situation (environment and family support)  [] Patient's level of motivation  [] Expected progression of patient      HISTORY:(criteria)    [] 27266 - no personal factors/history    [x] 73259 - has 1-2 personal factor/comorbidity     [] 97346 - has >3 personal factor/comorbidity     Body Regions:  [] Objective examination findings  [] Head     []  Neck  [] Trunk   [] Upper Extremity  [] Lower Extremity    Body Systems:  [] For communication ability, affect, cognition, language, and learning style: the assessment of the ability to make needs known, consciousness, orientation (person, place, and time), expected emotional /behavioral responses, and learning preferences (eg, learning barriers, education  needs)  [x] For the neuromuscular system: a general assessment of gross coordinated movement (eg, balance, gait, locomotion, transfers, and transitions) and motor function  (motor control and motor learning)  [x] For the musculoskeletal system: the assessment of gross symmetry, gross range of motion, gross strength, height, and weight  [x] For the integumentary system: the assessment of pliability(texture), presence of scar formation, skin color, and skin integrity  [] For cardiovascular/pulmonary system: the assessment of heart rate, respiratory rate, blood pressure, and edema     Activity limitations:    [] Patient's cognitive status and saf ety concerns          [] Status of current condition      [x] Weight bearing restriction  [] Cardiopulmunary Restriction    Participation Restrictions:   [] Goals and goal agreement with the patient     [] Rehab potential (prognosis) and probable outcome      Examination of Body  System: (criteria)    [] 29750 - addressing 1-2 elements    [] 87054 - addressing a total of 3 or more elements     [x] 65810 -  Addressing a total of 4 or more elements         Clinical Presentation: (criteria)  Stable - 05941     On examination of body system using standardized tests and measures patient presents with 4 or more elements from any of the following: body structures and functions, activity limitations, and/or participation restrictions.  Leading to a clinical presentation that is considered stable and/or uncomplicated                              Clinical Decision Making  (Eval Complexity):  Low- 58869     Time Tracking:     PT Received On: 11/10/18  PT Start Time: 1327     PT Stop Time: 1415  PT Total Time (min): 48 min     Billable Minutes: Eval 15; therapeutic activity 33      Alison Forte, PT  11/10/2018

## 2018-11-10 NOTE — ANESTHESIA POSTPROCEDURE EVALUATION
"Anesthesia Post Evaluation    Patient: Apple Watson    Procedure(s) Performed: Procedure(s) (LRB):  ARTHROPLASTY-HIP-NOLBERTO (Right)    Final Anesthesia Type: general  Patient location during evaluation: PACU  Patient participation: Yes- Able to Participate  Level of consciousness: awake and alert  Post-procedure vital signs: reviewed and stable  Pain management: adequate  Airway patency: patent  PONV status at discharge: No PONV  Anesthetic complications: no      Cardiovascular status: blood pressure returned to baseline  Respiratory status: unassisted  Hydration status: euvolemic  Follow-up not needed.        Visit Vitals  BP (!) 168/82   Pulse 85   Temp 36.4 °C (97.5 °F) (Temporal)   Resp 18   Ht 4' 10" (1.473 m)   Wt 58.5 kg (128 lb 15.5 oz)   SpO2 (!) 94%   Breastfeeding? No   BMI 26.95 kg/m²       Pain/Nida Score: Pain Assessment Performed: Yes (11/10/2018 10:15 AM)  Presence of Pain: denies (11/10/2018 10:15 AM)  Pain Rating Prior to Med Admin: 5 (11/10/2018  3:25 AM)  Pain Rating Post Med Admin: 5 (11/10/2018  3:25 AM)  Nida Score: 10 (11/10/2018 10:15 AM)        "

## 2018-11-10 NOTE — PROGRESS NOTES
"[facility] Hospital Medicine Progress Note    Primary Team: [facility] Hospitalist Team A  Attending Physician: Bee Murillo MD  Resident: Nkechi Kenyon  Intern: Mariluz Motta    Subjective:      No examined this AM as patient down for surgery.     Objective:     Last 24 Hour Vital Signs:  BP  Min: 129/63  Max: 186/88  Temp  Av.1 °F (36.7 °C)  Min: 97.7 °F (36.5 °C)  Max: 98.4 °F (36.9 °C)  Pulse  Av.8  Min: 72  Max: 113  Resp  Av.8  Min: 17  Max: 84  SpO2  Av.7 %  Min: 95 %  Max: 98 %  Height  Av' 10" (147.3 cm)  Min: 4' 10" (147.3 cm)  Max: 4' 10" (147.3 cm)  Weight  Av.5 kg (128 lb 15.5 oz)  Min: 58.5 kg (128 lb 15.5 oz)  Max: 58.5 kg (128 lb 15.5 oz)  I/O last 3 completed shifts:  In: -   Out: 1450 [Urine:1450]    Physical Examination:  No exam performed today, patient in surgery.      Laboratory:  Laboratory Data Reviewed: yes  Pertinent Findings:  BMP wnl  CBC wnl  Hgb A1c 5.0    Microbiology Data Reviewed: yes  Pertinent Findings:  none    Other Results:  EKG (my interpretation): normal sinus rhythm w/ premature atrial complexes, RAD    Radiology Data Reviewed: yes  Pertinent Findings:  XR pelvis, femur : There is acute subcapital right femoral neck fracture.  There is minimal proximal migration of the distal femoral component.  Femoral head is situated within the acetabulum without evidence of dislocation.  No additional acute pelvic fractures are seen.  No evidence of distal femur fracture.    Current Medications:     Infusions:   lactated ringers 100 mL/hr at 18 4944        Scheduled:   amitriptyline  10 mg Oral BID    ceFAZolin (ANCEF) IVPB  2 g Intravenous Once Pre-Op    heparin (porcine)  5,000 Units Subcutaneous Once    polyethylene glycol  17 g Oral Daily    senna-docusate 8.6-50 mg  1 tablet Oral BID    tranexamic acid  1,000 mg Intravenous Once Pre-Op    verapamil  240 mg Oral Daily        PRN:  HYDROmorphone, ondansetron, ramelteon, sodium chloride " 0.9%    Antibiotics and Day Number of Therapy:  Cefazolin in OR    Lines and Day Number of Therapy:  PIV x 1  Stephen catheter x 1    Assessment:     Apple Watson is a 89 y.o.female with  Patient Active Problem List    Diagnosis Date Noted    Fracture of femur 11/09/2018    Closed fracture of right hip 11/09/2018    Spondylolisthesis, grade 2 03/01/2017    Aortic calcification 03/01/2017    White coat syndrome with diagnosis of hypertension 03/01/2017    Calcification of aorta 03/01/2017    Irregular heart beat 03/01/2017    Scoliosis 02/10/2016    Osteoporosis 02/10/2016    Arthritis 02/10/2016    Essential hypertension 02/10/2016        Plan:     Femur fracture  - fracture as above, Orthopedic surgery consulted  - will give dilaudid & tylenol for pain control  - hold heparin this morning  - to OR for right hip arthroplasty with [facility] this AM  - Noted to have osteoporosis in chart, but no imaging available  - start calcium & Vit D supplementation in setting of femur fracture     Irregular heart beat  - not appreciated on my exam, will continue home verapamil  - monitor     Scoliosis  - Stable, will continue pain management as above, holding home medications     HCM  - HgA1c 5.0    Charli Zurita MD  [facility] Internal Medicine HO-1    [facility] Medicine Hospitalist Pager numbers:   [facility] Hospitalist Medicine Team A (Lidia/Maryanne): [phone]  [facility] Hospitalist Medicine Team B (Светлана/Wayne):  [phone]

## 2018-11-10 NOTE — ED NOTES
Report called to the floor. Pt calm and cooperative. Pain 5/10 states it is tolerable at this time. Pt to floor via stretcher

## 2018-11-10 NOTE — OP NOTE
DATE OF PROCEDURE:  11/09/2018.    PREOPERATIVE DIAGNOSES:  1.  Displaced right femoral neck fracture.  2.  Osteoporosis.    POSTOPERATIVE DIAGNOSES:  1.  Displaced right femoral neck fracture.  2.  Osteoporosis.    PROCEDURE:  Right hip hemiarthroplasty.    SURGEON:  Nasir Danielle M.D.    ANESTHESIA:  General endotracheal.    ESTIMATED BLOOD LOSS:  100 mL    SPECIMENS:  Bone to Pathology.    COMPLICATIONS:  None.    INDICATIONS:  Apple is an 89-year-old woman who fell yesterday afternoon   sustained the above injury was admitted through [facility] Emergency   Room and I was consulted.  Due to the unstable nature of the injury and the   patient's ambulatory status, surgery was indicated.  Informed consent was   obtained, discussing risks, benefits, and alternatives of surgery as noted in   the consent form with the patient and her family and they understood and   accepted risks and benefits and consented to surgery.    PROCEDURE IN DETAIL:  The patient was taken to the Operating Room, placed on the   operating table in the supine position.  After an adequate level of anesthesia   was obtained, the patient was turned to the left lateral decubitus position with   right hip up.  All prominent areas were padded.  The hip was stabilized with   positioners.  Preoperative prophylactic IV Ancef and tranexamic acid were given.    Timeout was performed and the hip was prepped and draped and standard anterior   lateral exposure was made sharply with the scalpel and carried sharply through   subcutaneous tissue.  Fascia was divided longitudinally and the underlying hip   abductor mechanism was exposed.  Incision was carried through the fascial   portion of the gluteus medius at its insertion extending to the tip of the   greater trochanter.  Gluteus minimus was retracted laterally and the capsule was   exposed and the capsule was T'd.  The fracture hematoma was irrigated out at   the wound.  The fracture was at  base of the femoral neck.  Therefore, femoral   neck cut was performed at the appropriate level and then the femoral head was   removed.  Acetabulum was inspected.  There was good articular cartilage and   acetabulum was otherwise intact; therefore, I decided to proceed with a   hemiarthroplasty.  The hip was brought in figure-of-four position.  Proximal   femur was exposed, opened and then lateralized and reamed sequentially up to a   size 5 and then broached for a size 5 proximally porous coated femoral stem.    The trial broach was left in place and the hip was trialed and was most stable   and balanced with a -3 spacer on a 45 mm head.  The hip was stable and balanced   with approximately 5-10 mm of shuck and was not easily dislocated.  Trial   components were removed.  The acetabulum and femoral canal were irrigated and   then the proximally porous coated femoral stem was impacted in place in   approximately 15 to 20 degrees of anteversion.  Pyle taper was cleaned and   dried and then the femoral head with the -3 neck was impacted in place.  The hip   was reduced.  It was once again brought through range of motion and was stable   and balanced.  Leg length was symmetric.  The capsule was then repaired with #2   Orthocord.  The gluteus medius fascia was repaired with #2 Orthocord into the   bone.  The tensor fascia was repaired in interrupted and running fashion with #1   Vicryl, subQ was closed with 2-0 Vicryl in layers.  Skin closed in a running   subcuticular fashion with 3-0 Stratafix and Prineo tape with Dermabond was   applied over the incision.  Sterile bandage was applied.  The patient was turned   to supine position, awakened and taken to Recovery Room in stable condition.    No complications.    PLAN:  Early mobilization of the anterior dislocation precautions, pain control,   Lovenox and SCDs for DVT prophylaxis.    COMPONENTS UTILIZED:  [employer] proximally porous coated femoral stem size 5    standard 145 mm length with a 12/14 taper, modular Sharita tapered spacer, -3   neck with a 12/14 taper and then a modular [place] femoral head hip ball size   45 mm outside diameter.      BRIGID  dd: 11/10/2018 10:13:55 (CST)  td: 11/10/2018 12:07:02 (CST)  Doc ID   #[id number]  Job ID #[id number]    CC:

## 2018-11-10 NOTE — PLAN OF CARE
"VSS. No changes noted. Denies pain or discomfort @ this time. "Ok to release to room @ this time", per Dr Sims. Bedside report given to pt's nurse Kay, with time allotted for questions. Xray @ bs. Family updated.   "

## 2018-11-10 NOTE — PLAN OF CARE
Problem: Patient Care Overview  Goal: Individualization & Mutuality  Outcome: Ongoing (interventions implemented as appropriate)  Pt aao x3. Patient safety maintained. Pain controlled with pain meds as ordered . No N&V,  No diarrhea. No distress noted. Will continue to monitor.

## 2018-11-10 NOTE — CONSULTS
DATE OF CONSULTATION:  11/09/2018    TIME OF CONSULTATION:  09:45 p.m.    ATTENDING PHYSICIAN:  Bee Murillo M.D.    CONSULTANT:  Nasir Danielle M.D.    REASON FOR CONSULTATION:  Evaluation, recommendation and treatment, right hip   fracture.    HISTORY OF PRESENT ILLNESS:  Ms. Chiu is an 89-year-old woman who was walking   this afternoon, felt a pop in her hip, which caused her to fall to the ground.    She was unable to get up and was apparently on the floor for 1 to 2 hours   before someone was able to come and help her.  She did not hit her head, had no   loss of consciousness and is only complaining of pain in the right groin area.    She has a past history of bilateral wrist fractures from a fall.  No other   trauma.  The pain in the hip is rated 9 to 10/10 with any motion of the hip.  At   the time of the assessment, pain was 3 to 4.  The patient is currently on no   blood thinners, lives at home alone.    PAST MEDICAL HISTORY:  Negative for hypertension and diabetes.    PAST SURGERIES:  Appendectomy, gallbladder surgery, hysterectomy, repair of   wrist fractures.    ALLERGIES:  Celebrex, Cymbalta, iodine, lovastatin, sulfa, Vioxx and Savella.    SOCIAL HISTORY:  Nonsmoker.  No alcohol intake and as noted, the patient lives   at home alone.    MEDICATIONS:  Elavil, vitamin B12, vitamin D2, Neurontin, Calan SR, Norco and   vitamin A.    REVIEW OF SYSTEMS:  Aside for HPI is unremarkable.  CONSTITUTIONAL:  No diet changes.  No weight gain or weight loss.  HEENT:  No vision problems.  CARDIOVASCULAR:  No chest pain or palpitations.  PULMONARY:  No shortness of breath or cough.  GASTROINTESTINAL:  No nausea, vomiting or diarrhea.  GENITOURINARY:  There is dysuria.  She does have a history of kidney stones.  ENDOCRINE:  No polyphagia or polydipsia.  NEUROLOGIC:  Negative.  PSYCHIATRY:  History of depression.  MUSCULOSKELETAL:  As per HPI.    PHYSICAL EXAMINATION:  GENERAL:  The patient is pleasant, lying  in the hospital bed and family members   are at bedside during my assessment in the Emergency Room.  HEENT:  AT/NC.  Pharynx is benign.  NECK:  Mild decreased motion with no pain, no palpable step-offs or deformity.    Thoracic and lumbar spine nontender, no palpable abnormalities.  LUNGS:  Clear.  HEART:  Regular.  No murmurs or gallops.  ABDOMEN:  Soft and nontender.  No HSM.  EXTREMITIES:  Full range of motion of all joints, both upper and left lower   extremity scar from previous surgeries on wrist.  Right lower extremity, hip   shortened and externally rotated.  Dorsalis pedis and posterior tibial pulses   are 2+.  She is able to actively flex and extend ankle and toes.  Sensory is   intact to all dermatomes.  Pain with any motion of the right hip area,   nontender.  No swelling about the right knee.  VITAL SIGNS:  Temperature 98.4, pulse 84, respirations 16, blood pressure   157/90.    IMAGING:  X-rays of the AP of the pelvis reveal right femoral neck fracture,   shortened externally rotated, moderate diffuse osteopenia.  No abnormalities   elsewhere in the pelvis.  Chest x-ray:  AP appears to be benign.    LABORAOTRY DATA:  PT/INR 1.0.  CMP normal, aside for her CO2 is 22 and glucose   140.  CBC:  Hematocrit 40.8, white blood cell count 11.83.    ASSESSMENT:  Elderly woman with fall and right femoral neck fracture, displaced,   Garden III.    PLAN:  Discussed treatment options with the patient and the family and   recommended surgery with hemiarthroplasty of the hip as she was active prior to   this injury.  They understood, accepted risks, benefits as well as all other   alternatives including essentially bed rest and wished to proceed with surgery.    Informed consent was obtained from the patient's son; however, the patient gave   verbal consent.  However, she had received narcotics prior to my assessment.    Plan for surgery in the morning as soon as OR is available.      BRIGID  dd: 11/10/2018 07:47:35  (CST)  td: 11/10/2018 09:44:38 (CST)  Doc ID   #[id number]  Job ID #[id number]    CC:

## 2018-11-11 PROBLEM — I21.4 NSTEMI (NON-ST ELEVATED MYOCARDIAL INFARCTION): Status: ACTIVE | Noted: 2018-11-11

## 2018-11-11 LAB
ANION GAP SERPL CALC-SCNC: 11 MMOL/L
ANISOCYTOSIS BLD QL SMEAR: SLIGHT
BASOPHILS # BLD AUTO: 0.01 K/UL
BASOPHILS NFR BLD: 0.1 %
BUN SERPL-MCNC: 10 MG/DL
CALCIUM SERPL-MCNC: 8.7 MG/DL
CHLORIDE SERPL-SCNC: 102 MMOL/L
CO2 SERPL-SCNC: 23 MMOL/L
CREAT SERPL-MCNC: 0.6 MG/DL
DIFFERENTIAL METHOD: ABNORMAL
EOSINOPHIL # BLD AUTO: 0 K/UL
EOSINOPHIL NFR BLD: 0 %
ERYTHROCYTE [DISTWIDTH] IN BLOOD BY AUTOMATED COUNT: 12.8 %
EST. GFR  (AFRICAN AMERICAN): >60 ML/MIN/1.73 M^2
EST. GFR  (NON AFRICAN AMERICAN): >60 ML/MIN/1.73 M^2
GLUCOSE SERPL-MCNC: 116 MG/DL
HCT VFR BLD AUTO: 35.3 %
HGB BLD-MCNC: 11.2 G/DL
HYPOCHROMIA BLD QL SMEAR: ABNORMAL
LYMPHOCYTES # BLD AUTO: 1.1 K/UL
LYMPHOCYTES NFR BLD: 6.5 %
MCH RBC QN AUTO: 28.9 PG
MCHC RBC AUTO-ENTMCNC: 31.7 G/DL
MCV RBC AUTO: 91 FL
MONOCYTES # BLD AUTO: 2.2 K/UL
MONOCYTES NFR BLD: 13.8 %
NEUTROPHILS # BLD AUTO: 12.8 K/UL
NEUTROPHILS NFR BLD: 79.3 %
PLATELET # BLD AUTO: 206 K/UL
PLATELET BLD QL SMEAR: ABNORMAL
PMV BLD AUTO: 9.6 FL
POTASSIUM SERPL-SCNC: 3.9 MMOL/L
RBC # BLD AUTO: 3.87 M/UL
SODIUM SERPL-SCNC: 136 MMOL/L
TROPONIN I SERPL DL<=0.01 NG/ML-MCNC: 0.04 NG/ML
TROPONIN I SERPL DL<=0.01 NG/ML-MCNC: 0.05 NG/ML
TROPONIN I SERPL DL<=0.01 NG/ML-MCNC: 0.05 NG/ML
WBC # BLD AUTO: 16.18 K/UL

## 2018-11-11 PROCEDURE — G8988 SELF CARE GOAL STATUS: HCPCS | Mod: CJ

## 2018-11-11 PROCEDURE — 36415 COLL VENOUS BLD VENIPUNCTURE: CPT

## 2018-11-11 PROCEDURE — 97166 OT EVAL MOD COMPLEX 45 MIN: CPT

## 2018-11-11 PROCEDURE — 93005 ELECTROCARDIOGRAM TRACING: CPT

## 2018-11-11 PROCEDURE — 84484 ASSAY OF TROPONIN QUANT: CPT | Mod: 91

## 2018-11-11 PROCEDURE — 11000001 HC ACUTE MED/SURG PRIVATE ROOM

## 2018-11-11 PROCEDURE — 94799 UNLISTED PULMONARY SVC/PX: CPT

## 2018-11-11 PROCEDURE — G8987 SELF CARE CURRENT STATUS: HCPCS | Mod: CL

## 2018-11-11 PROCEDURE — 85025 COMPLETE CBC W/AUTO DIFF WBC: CPT

## 2018-11-11 PROCEDURE — 94761 N-INVAS EAR/PLS OXIMETRY MLT: CPT

## 2018-11-11 PROCEDURE — 63600175 PHARM REV CODE 636 W HCPCS: Performed by: STUDENT IN AN ORGANIZED HEALTH CARE EDUCATION/TRAINING PROGRAM

## 2018-11-11 PROCEDURE — 97535 SELF CARE MNGMENT TRAINING: CPT

## 2018-11-11 PROCEDURE — 25000003 PHARM REV CODE 250

## 2018-11-11 PROCEDURE — 25000003 PHARM REV CODE 250: Performed by: STUDENT IN AN ORGANIZED HEALTH CARE EDUCATION/TRAINING PROGRAM

## 2018-11-11 PROCEDURE — 97530 THERAPEUTIC ACTIVITIES: CPT

## 2018-11-11 PROCEDURE — 93010 ELECTROCARDIOGRAM REPORT: CPT | Mod: ,,, | Performed by: INTERNAL MEDICINE

## 2018-11-11 PROCEDURE — 80048 BASIC METABOLIC PNL TOTAL CA: CPT

## 2018-11-11 PROCEDURE — 97116 GAIT TRAINING THERAPY: CPT

## 2018-11-11 RX ORDER — KETOROLAC TROMETHAMINE 30 MG/ML
30 INJECTION, SOLUTION INTRAMUSCULAR; INTRAVENOUS ONCE
Status: DISCONTINUED | OUTPATIENT
Start: 2018-11-11 | End: 2018-11-11

## 2018-11-11 RX ORDER — OXYCODONE AND ACETAMINOPHEN 5; 325 MG/1; MG/1
1 TABLET ORAL EVERY 4 HOURS PRN
Status: DISCONTINUED | OUTPATIENT
Start: 2018-11-11 | End: 2018-11-14

## 2018-11-11 RX ORDER — KETOROLAC TROMETHAMINE 30 MG/ML
15 INJECTION, SOLUTION INTRAMUSCULAR; INTRAVENOUS EVERY 6 HOURS PRN
Status: DISPENSED | OUTPATIENT
Start: 2018-11-11 | End: 2018-11-13

## 2018-11-11 RX ORDER — METOPROLOL TARTRATE 1 MG/ML
5 INJECTION, SOLUTION INTRAVENOUS ONCE
Status: COMPLETED | OUTPATIENT
Start: 2018-11-11 | End: 2018-11-11

## 2018-11-11 RX ORDER — MORPHINE SULFATE 15 MG/1
15 TABLET, FILM COATED, EXTENDED RELEASE ORAL EVERY 12 HOURS
Status: DISCONTINUED | OUTPATIENT
Start: 2018-11-11 | End: 2018-11-15 | Stop reason: HOSPADM

## 2018-11-11 RX ADMIN — ENOXAPARIN SODIUM 40 MG: 100 INJECTION SUBCUTANEOUS at 04:11

## 2018-11-11 RX ADMIN — RAMELTEON 8 MG: 8 TABLET, FILM COATED ORAL at 11:11

## 2018-11-11 RX ADMIN — VERAPAMIL HYDROCHLORIDE 240 MG: 240 TABLET, FILM COATED, EXTENDED RELEASE ORAL at 10:11

## 2018-11-11 RX ADMIN — HYDROMORPHONE HYDROCHLORIDE 0.5 MG: 2 INJECTION INTRAMUSCULAR; INTRAVENOUS; SUBCUTANEOUS at 04:11

## 2018-11-11 RX ADMIN — HYDROMORPHONE HYDROCHLORIDE 0.5 MG: 2 INJECTION INTRAMUSCULAR; INTRAVENOUS; SUBCUTANEOUS at 07:11

## 2018-11-11 RX ADMIN — MORPHINE SULFATE 15 MG: 15 TABLET, FILM COATED, EXTENDED RELEASE ORAL at 09:11

## 2018-11-11 RX ADMIN — MORPHINE SULFATE 15 MG: 15 TABLET, FILM COATED, EXTENDED RELEASE ORAL at 01:11

## 2018-11-11 RX ADMIN — METOPROLOL TARTRATE 5 MG: 1 INJECTION, SOLUTION INTRAVENOUS at 04:11

## 2018-11-11 RX ADMIN — HYDROMORPHONE HYDROCHLORIDE 0.5 MG: 2 INJECTION INTRAMUSCULAR; INTRAVENOUS; SUBCUTANEOUS at 01:11

## 2018-11-11 RX ADMIN — OXYCODONE HYDROCHLORIDE AND ACETAMINOPHEN 1 TABLET: 5; 325 TABLET ORAL at 10:11

## 2018-11-11 RX ADMIN — MUPIROCIN 1 G: 20 OINTMENT TOPICAL at 08:11

## 2018-11-11 RX ADMIN — MUPIROCIN 1 G: 20 OINTMENT TOPICAL at 09:11

## 2018-11-11 NOTE — PT/OT/SLP PROGRESS
"Physical Therapy Treatment    Patient Name:  Apple Watson   MRN:  [id number]    Recommendations:     Discharge Recommendations:  nursing facility, skilled, home health PT, home health OT((HH OT/PT ONLY if she has 24/7 supervision at home)   Discharge Equipment Recommendations: walker, rolling, bath bench   Barriers to discharge: Decreased caregiver support    Assessment:     Apple Watson is a 89 y.o. female admitted with a medical diagnosis of Closed fracture of neck of right femur.  She presents with the following impairments/functional limitations:  weakness, impaired endurance, impaired self care skills, impaired balance, gait instability, impaired functional mobilty, decreased coordination, decreased lower extremity function, decreased ROM, orthopedic precautions, decreased upper extremity function, impaired skin, pain, decreased safety awareness, impaired coordination. Pt able to ambulate ~12 ft with RW and min A needed. Slow renée speed. Would benefit from continued PT services to address all impairments listed above and to increase pt's tolerance for out of bed therapeutic activity and exercise.    Rehab Prognosis:  Excellent; patient would benefit from acute skilled PT services to address these deficits and reach maximum level of function.      Recent Surgery: Procedure(s) (LRB):  ARTHROPLASTY-HIP-NOLBERTO (Right) 1 Day Post-Op    Plan:     During this hospitalization, patient to be seen daily to address the above listed problems via gait training, therapeutic activities, therapeutic exercises  · Plan of Care Expires:  11/30/18   Plan of Care Reviewed with: patient, daughter    Subjective     Communicated with nurse prior to session.  Patient found supine with daughter present upon PT entry to room, agreeable to treatment.      Chief Complaint: No complaints voiced  Patient comments/goals: "I want to get better and have none of this pain".  Pain/Comfort:  · Pain Rating 1: 6/10  · Location - Side 1: " Right  · Location - Orientation 1: generalized  · Location 1: hip  · Pain Addressed 1: Cessation of Activity, Nurse notified  · Pain Rating Post-Intervention 1: 8/10    Patients cultural, spiritual, Presybeterian conflicts given the current situation: None stated    Objective:     Patient found with: bed alarm, SCD, peripheral IV, telemetry     General Precautions: Standard, fall   Orthopedic Precautions:RLE weight bearing as tolerated   Braces: N/A     Functional Mobility:  · Bed Mobility:     · Rolling Right: minimum assistance  · Scooting: minimum assistance  · Supine to Sit: moderate assistance and  for advancement of trunk and BLE's  · Sit to Supine: moderate assistance and  For advancement of at trunk and BLE's  · Transfers:     · Sit to Stand:  minimum assistance with rolling walker  · Gait:  ~12 ft with RW, min A, and extra time needed as pt with slow renée      AM-PAC 6 CLICK MOBILITY  Turning over in bed (including adjusting bedclothes, sheets and blankets)?: 2  Sitting down on and standing up from a chair with arms (e.g., wheelchair, bedside commode, etc.): 3  Moving from lying on back to sitting on the side of the bed?: 2  Moving to and from a bed to a chair (including a wheelchair)?: 2  Need to walk in hospital room?: 2  Climbing 3-5 steps with a railing?: 2  Basic Mobility Total Score: 13       Therapeutic Activities and Exercises:   Pt requires extra time to complete all bed mobility, transfers, and ambulation. Sat at EOB ~5 minutes working on weight shifting through left and right hips to increase weight bearing to right glut as pt demonstrated a decrease in weight bearing (offloading right hip) in right glut. Ambulation training ~12 ft with RW, min A, and extra time needed to complete .In beginning of ambulation pt only sliding right and left foot forward after ~3 ft of this pt able to clear bilateral feet off floor working toward achieving a closer to normal gait pattern.    Patient left supine with  all lines intact, call button in reach, bed alarm on, nurse notified and  daughter present..    GOALS:   Multidisciplinary Problems     Physical Therapy Goals        Problem: Physical Therapy Goal    Goal Priority Disciplines Outcome Goal Variances Interventions   Physical Therapy Goal     PT, PT/OT Ongoing (interventions implemented as appropriate)     Description:  Goals to be met by: 11/30/18     Patient will increase functional independence with mobility by performing:    Supine <> sit spvn  Sit<>stand spvn  Pt will ambulate with  feet with spvn  Pt will perform LE TE x 15 reps in supine and sitting  Pt will ascend and descend 1 step with RW and CGA                      Time Tracking:     PT Received On: 11/11/18  PT Start Time: 1309     PT Stop Time: 1345  PT Total Time (min): 36 min     Billable Minutes: Gait Training   20 and Therapeutic Activity   16    Treatment Type: Treatment  PT/PTA: PTA     PTA Visit Number: 1     Melody Esteban, PTA  11/11/2018

## 2018-11-11 NOTE — PLAN OF CARE
Problem: Occupational Therapy Goal  Goal: Occupational Therapy Goal  Goals to be met by: 12/11/2018      Patient will increase functional independence with ADLs by performing:    UE Dressing with [place].  LE Dressing with Modified [place].  Grooming while standing with Stand-by Assistance.  Toileting from toilet with Minimal Assistance for hygiene and clothing management.   Sitting at edge of bed x15 minutes with Modified [place].  Rolling to Bilateral with Stand-by Assistance.   Supine to sit with Stand-by Assistance.  Step transfer with Stand-by Assistance  Increased functional strength to WFL for self care.  Upper extremity exercise program x8 reps per handout, with assistance as needed.    Outcome: Ongoing (interventions implemented as appropriate)  Pt would benefit from continued OT to address deficits in self care and functional mobility. Recommending SNF; DME needs RW, TTB BSC

## 2018-11-11 NOTE — PLAN OF CARE
Problem: Patient Care Overview  Goal: Plan of Care Review  Outcome: Ongoing (interventions implemented as appropriate)  Pt AAOx4, VSS, NAD noted, no complaints of n/v/d, complaints of pain controlled with PRN medication, tolerating regular diet, tele box in place, family at bedside, bed alarm active, safety has been maintained, will continue to monitor.

## 2018-11-11 NOTE — PROGRESS NOTES
"[facility]  Orthopedics  Progress Note    Patient Name: Apple Watson  MRN: [id number]  Admission Date: 11/9/2018  Hospital Length of Stay: 2 days  Attending Provider: Bee Murillo MD  Primary Care Provider: Tree Rolon MD  Follow-up For: Procedure(s) (LRB):  ARTHROPLASTY-HIP-NOLBERTO (Right)    Post-Operative Day: 1 Day Post-Op  Subjective:     Principal Problem:Closed fracture of neck of right femur    Principal Orthopedic Problem: Post op Nolberto-arthroplasty hip    Interval History: Moderate pain overnight.  She normally takes percocet from her pain doctor    Review of patient's allergies indicates:   Allergen Reactions    Celebrex [celecoxib] Other (See Comments)     Blood in bowel    Cymbalta [duloxetine]     Iodine and iodide containing products     Lovastatin     Sulpho-lac [sulfur]     Vioxx [rofecoxib]     Savella [milnacipran] Rash       Current Facility-Administered Medications   Medication    acetaminophen tablet 650 mg    enoxaparin injection 40 mg    hydromorphone (PF) injection 0.5 mg    ketorolac injection 15 mg    mupirocin 2 % ointment 1 g    ondansetron injection 4 mg    oxyCODONE-acetaminophen 5-325 mg per tablet 1 tablet    polyethylene glycol packet 17 g    ramelteon tablet 8 mg    senna-docusate 8.6-50 mg per tablet 1 tablet    sodium chloride 0.9% flush 3 mL    traMADol tablet 50 mg    verapamil CR tablet 240 mg     Objective:     Vital Signs (Most Recent):  Temp: 98.2 °F (36.8 °C) (11/11/18 0327)  Pulse: 105 (11/11/18 0840)  Resp: 17 (11/11/18 0840)  BP: (!) 148/78 (11/11/18 0422)  SpO2: 95 % (11/11/18 0840) Vital Signs (24h Range):  Temp:  [97.5 °F (36.4 °C)-98.9 °F (37.2 °C)] 98.2 °F (36.8 °C)  Pulse:  [] 105  Resp:  [16-18] 17  SpO2:  [93 %-98 %] 95 %  BP: (121-189)/(60-96) 148/78     Weight: 63.5 kg (139 lb 15.9 oz)  Height: 4' 10" (147.3 cm)  Body mass index is 29.26 kg/m².      Intake/Output Summary (Last 24 hours) at 11/11/2018 0958  Last " data filed at 11/11/2018 0630  Gross per 24 hour   Intake 2036.25 ml   Output 1800 ml   Net 236.25 ml       Ortho/SPM Exam  Inbcision intact, dressing removed.  Homans neg.  NVI   Significant Labs:   BMP:   Recent Labs   Lab 11/11/18  0347   *      K 3.9      CO2 23   BUN 10   CREATININE 0.6   CALCIUM 8.7     CBC:   Recent Labs   Lab 11/09/18  1628 11/10/18  0459 11/11/18  0347   WBC 11.83 9.58 16.18*   HGB 13.2 12.6 11.2*   HCT 40.8 39.6 35.3*    231 206     All pertinent labs within the past 24 hours have been reviewed.    Significant Imaging: X-Ray: I have reviewed all pertinent results/findings and my personal findings are:  post op hip films show hip stable  I have reviewed and interpreted all pertinent imaging results/findings.    Assessment/Plan:     Active Diagnoses:    Diagnosis Date Noted POA    PRINCIPAL PROBLEM:  Closed fracture of neck of right femur [S72.001A] 11/09/2018 Yes    NSTEMI (non-ST elevated myocardial infarction) [I21.4] 11/11/2018 No    Fall [W19.XXXA]  Yes    Irregular heart beat [I49.9] 03/01/2017 Yes    Scoliosis [M41.9] 02/10/2016 Yes    Osteoporosis [M81.0] 02/10/2016 Yes      Problems Resolved During this Admission:    Diagnosis Date Noted Date Resolved POA    Fracture of femur [S72.90XA] 11/09/2018 11/10/2018 Yes      Post op Castillo-arthroplasty hip      Plan- adjust pain meds, continue to mobilize    Nasir Danielle MD  Orthopedics  [facility]

## 2018-11-11 NOTE — PLAN OF CARE
Problem: Patient Care Overview  Goal: Plan of Care Review  Outcome: Ongoing (interventions implemented as appropriate)  Pt aao x3. Patient complained of pain and hydromorphone was given.  No reports of nausea or vomiting.  Pt was tachycardic with  P130. MD Augustin was notified. MD ordered telemetry, troponin, metoprolol, and STAT EKG was taken.AFIB is noted. Hip incision is CDI and covered with no drainage. Stephen is DC. All meds were tolerated well. Will continue to monitor. Bed is in lowest position with call light in reach.

## 2018-11-11 NOTE — CARE UPDATE
Pt was tachycardic with P130. MD Augustin was notified. MD ordered telemetry, troponin, metoprolol, and STAT EKG was taken. Will continue to monitor

## 2018-11-11 NOTE — PLAN OF CARE
Received notification from floor nursing that patient was hypertensive and tachycardic, with previous two HR measurements >120.  Ordered STAT EKG and troponin.  Assessing patient at bedside, patient was complaining of 7/10 pain at the operative site and had last received a Dilaudid 0.5 mg IV dose at 0158. Patient endorsed palpitations and denied chest pain, shortness of breath, profound fatigue or lightheadedness.  On exam, patient was noted to be tachycardic with an irregularly irregular rhythm.  Lung sounds were clear at bilateral bases.  At assessment patient's /92, , RR 18, afebrile.  EKG showed atrial fibrillation with rapid ventricular response.    Plan:  - f/u troponin  - f/u TTE  - start telemetry  - patient to receive metoprolol 5 mg IV for rate control  - consider increasing verapamil to 360 or 480 mg daily  - continue Lovenox 40 mg daily    Charli Zurita, HO-1  [facility]    Addendum: patient given metoprolol 5 IV, HR responded with rates between 90-100s on telemetry.  Troponin 0.046.  Will trend troponin and continue with plan as above.

## 2018-11-11 NOTE — PLAN OF CARE
11/10/18 1802   Discharge Assessment   Assessment Type Discharge Planning Assessment   Confirmed/corrected address and phone number on facesheet? Yes   Assessment information obtained from? Patient   Prior to hospitilization cognitive status: Alert/Oriented   Prior to hospitalization functional status: Independent   Current cognitive status: Alert/Oriented   Current Functional Status: Independent   Facility Arrived From: home   Lives With alone   Able to Return to Prior Arrangements unable to determine at this time (comments)   Is patient able to care for self after discharge? Unable to determine at this time (comments)   Who are your caregiver(s) and their phone number(s)? Pattie Crane(dtr)[phone]   Patient's perception of discharge disposition home health   Readmission Within The Last 30 Days no previous admission in last 30 days   Patient currently being followed by outpatient case management? No   Patient currently receives any other outside agency services? No   Equipment Currently Used at Home cane, straight;shower chair   Do you have any problems affording any of your prescribed medications? No   Is the patient taking medications as prescribed? yes   Does the patient have transportation home? Yes   Transportation Available family or friend will provide   Does the patient receive services at the [facility]? No   Discharge Plan A Home with family   Discharge Plan B Home Health   Patient/Family In Agreement With Plan yes     The Sw met with the pt and her daughter Pattie Crane who was at bedside to complet the assessment. The pt was independent with her adl's and had a straight cane and shower chair at home. The pt lives alone but her son and dtr live down the street from her. The pt has a very very supportive family. The pt's family transports her but she states she just stopped driving. The Sw left her contact info on the white board in the pt's room.

## 2018-11-11 NOTE — CARE UPDATE
Stephen removed at 0630. No signs of distress. Tolerated well. Will monitor for urine output within 6hrs

## 2018-11-11 NOTE — PROGRESS NOTES
[facility] Medicine Progress Note    Primary Team: [facility] Hospitalist Team A  Attending Physician: Bee Murillo MD  Resident: Aliza  Intern: Kalia    Subjective:      Ms. Watson is a 89yo female that we are consulted on for pre-op prior to hip arthroplasty performed on 11/10 at [facility].     This morning, the patient was found to be hypertensive, tachycardic. EKG was read as Afib with RVR for which she was given IV metoprolol 5mg with good response. Troponin was intermediate to 0.046 and then 0.053. Patient denied CP during this time.     On my rounds patient doing well and eating breakfast. Last EKG with sinus tachycardia. Patient does not have an outside cardiologist and has never been told why her heart has a funny rhythm. Continues to deny CP. She is having pain at the site of her surgery. Denies Fever, Chills, SOB, or Abd pain.     Objective:     Last 24 Hour Vital Signs:  BP  Min: 121/89  Max: 189/83  Temp  Av.1 °F (36.7 °C)  Min: 97.5 °F (36.4 °C)  Max: 98.9 °F (37.2 °C)  Pulse  Av.1  Min: 60  Max: 130  Resp  Av.4  Min: 16  Max: 18  SpO2  Av.2 %  Min: 93 %  Max: 98 %  Weight  Av.5 kg (133 lb 6.1 oz)  Min: 57.5 kg (126 lb 12.2 oz)  Max: 63.5 kg (139 lb 15.9 oz)  I/O last 3 completed shifts:  In: 2636.3 [P.O.:470; I.V.:2066.3; IV Piggyback:100]  Out: 3400 [Urine:3400]    Physical Examination:  General appearance: alert, appears stated age and cooperative  Head: Normocephalic, without obvious abnormality, atraumatic  Eyes: conjunctivae/corneas clear. EOM's intact.  Throat: lips, mucosa, and tongue normal; teeth and gums normal, MMM  Lungs: clear to auscultation bilaterally, unlabored respirations, symmetric  Heart: regular rate and rhythm, S1, S2 normal, no murmurs  Abdomen: soft, non-tender; bowel sounds normal  Extremities: extremities normal, atraumatic, no cyanosis or edema, right hip tender and immobilized   Pulses: 2+ and symmetric  Skin: Skin color, texture, turgor normal.  No rashes or lesions  Neurologic: Grossly normal, A&Ox4       Laboratory:  Laboratory Data Reviewed: yes  Pertinent Findings:  Recent Labs   Lab 11/09/18  1628 11/10/18  0459 11/11/18  0347   WBC 11.83 9.58 16.18*   HGB 13.2 12.6 11.2*   HCT 40.8 39.6 35.3*    231 206   MCV 91 90 91   RDW 12.6 12.6 12.8    138 136   K 3.6 4.1 3.9    105 102   CO2 22* 24 23   BUN 15 10 10   CREATININE 0.7 0.6 0.6   * 100 116*   CALCIUM 8.9 9.2 8.7   PROT 6.7  --   --    ALBUMIN 3.6  --   --    AST 18  --   --    ALT 12  --   --    ALKPHOS 115  --   --    BILITOT 0.6  --   --       Trended Cardiac Data:   Recent Labs   Lab 11/11/18  0347 11/11/18  0450   TROPONINI 0.046* 0.053*        Other Results:  EKG (my interpretation): sinus tachycardia, t-wave flattening, normal axis, PVCs    Radiology Data Reviewed: yes  Pertinent Findings:  11/10/18: Postsurgical changes of recent hip arthroplasty procedure. The orthopedic hardware appears in good position and alignment without adjacent fracture.    Current Medications:     Infusions:   lactated ringers 75 mL/hr at 11/10/18 2355        Scheduled:   enoxaparin  40 mg Subcutaneous Daily    mupirocin  1 g Nasal BID    polyethylene glycol  17 g Oral Daily    senna-docusate 8.6-50 mg  1 tablet Oral BID    verapamil  240 mg Oral Daily        PRN:  acetaminophen, HYDROmorphone, ondansetron, ramelteon, sodium chloride 0.9%, traMADol    Antibiotics and Day Number of Therapy:  Cefazoline x1 on 11/10 in surgery    Lines and Day Number of Therapy:  PIV - 11/9    Assessment:     Apple Watson is a 89 y.o.female with  Patient Active Problem List    Diagnosis Date Noted    NSTEMI (non-ST elevated myocardial infarction) 11/11/2018    Fall     Closed fracture of neck of right femur 11/09/2018    Spondylolisthesis, grade 2 03/01/2017    Aortic calcification 03/01/2017    White coat syndrome with diagnosis of hypertension 03/01/2017    Calcification of aorta 03/01/2017     Irregular heart beat 03/01/2017    Scoliosis 02/10/2016    Osteoporosis 02/10/2016    Arthritis 02/10/2016    Essential hypertension 02/10/2016        Plan:     Intermediate Troponin  - Patient found to be hypertensive and tachycardic this morning with an EKG originally read as Afib with RVR. Repeat EKG with Sinus tachycardia and PVCs  - Troponin 0.046 >> 0.053, Will continue to trend until peak  - Likely due to stress of surgery plus hypertensive episode from pain  - Instructed patient to ask for PRNs pain medicines and we will continue to monitor her troponin's.    Femur fracture  - fracture as above, Orthopedic surgery consulted  - will give dilaudid & tylenol for pain control  - hold heparin this morning  - to OR with [facility]  - Noted to have osteoporosis in chart, but no imaging available  - Calcium & Vit D supplementation in setting of femur fracture     Irregular heart beat  - not appreciated on my exam, will continue home verapamil  - monitor     Scoliosis  - Stable, will continue pain management as above, holding home medications     HCM  - HgA1c 5     F: None  E: monitor, wnl  N: Regular  Ppx: Lovenox  Dispo: Per primary team, patient doing well this AM. Will require PT services    Myla Phillips MD  [facility] Internal Medicine HO-I    [facility] Medicine Hospitalist Pager numbers:   [facility] Hospitalist Medicine Team A (Lidia/Maryanne): [phone]  [facility] Hospitalist Medicine Team B (Светлана/Wayne):  [phone]

## 2018-11-12 LAB
ANION GAP SERPL CALC-SCNC: 11 MMOL/L
ANISOCYTOSIS BLD QL SMEAR: SLIGHT
BASOPHILS # BLD AUTO: 0.01 K/UL
BASOPHILS NFR BLD: 0.1 %
BUN SERPL-MCNC: 26 MG/DL
CALCIUM SERPL-MCNC: 8.9 MG/DL
CHLORIDE SERPL-SCNC: 99 MMOL/L
CO2 SERPL-SCNC: 24 MMOL/L
CREAT SERPL-MCNC: 1 MG/DL
DIFFERENTIAL METHOD: ABNORMAL
EOSINOPHIL # BLD AUTO: 0 K/UL
EOSINOPHIL NFR BLD: 0.1 %
ERYTHROCYTE [DISTWIDTH] IN BLOOD BY AUTOMATED COUNT: 12.8 %
EST. GFR  (AFRICAN AMERICAN): 58 ML/MIN/1.73 M^2
EST. GFR  (NON AFRICAN AMERICAN): 50 ML/MIN/1.73 M^2
GLUCOSE SERPL-MCNC: 104 MG/DL
HCT VFR BLD AUTO: 32.8 %
HGB BLD-MCNC: 10.4 G/DL
HYPOCHROMIA BLD QL SMEAR: ABNORMAL
LYMPHOCYTES # BLD AUTO: 1.8 K/UL
LYMPHOCYTES NFR BLD: 12.6 %
MCH RBC QN AUTO: 28.9 PG
MCHC RBC AUTO-ENTMCNC: 31.7 G/DL
MCV RBC AUTO: 91 FL
MONOCYTES # BLD AUTO: 2.1 K/UL
MONOCYTES NFR BLD: 14.4 %
NEUTROPHILS # BLD AUTO: 10.3 K/UL
NEUTROPHILS NFR BLD: 72.8 %
PLATELET # BLD AUTO: 223 K/UL
PLATELET BLD QL SMEAR: ABNORMAL
PMV BLD AUTO: 10.3 FL
POIKILOCYTOSIS BLD QL SMEAR: SLIGHT
POTASSIUM SERPL-SCNC: 4.2 MMOL/L
RBC # BLD AUTO: 3.6 M/UL
SODIUM SERPL-SCNC: 134 MMOL/L
WBC # BLD AUTO: 14.2 K/UL

## 2018-11-12 PROCEDURE — 93005 ELECTROCARDIOGRAM TRACING: CPT

## 2018-11-12 PROCEDURE — 63600175 PHARM REV CODE 636 W HCPCS: Performed by: STUDENT IN AN ORGANIZED HEALTH CARE EDUCATION/TRAINING PROGRAM

## 2018-11-12 PROCEDURE — 97535 SELF CARE MNGMENT TRAINING: CPT

## 2018-11-12 PROCEDURE — 25000003 PHARM REV CODE 250

## 2018-11-12 PROCEDURE — 94799 UNLISTED PULMONARY SVC/PX: CPT

## 2018-11-12 PROCEDURE — 11000001 HC ACUTE MED/SURG PRIVATE ROOM

## 2018-11-12 PROCEDURE — 99900035 HC TECH TIME PER 15 MIN (STAT)

## 2018-11-12 PROCEDURE — 94761 N-INVAS EAR/PLS OXIMETRY MLT: CPT

## 2018-11-12 PROCEDURE — 36415 COLL VENOUS BLD VENIPUNCTURE: CPT

## 2018-11-12 PROCEDURE — 97530 THERAPEUTIC ACTIVITIES: CPT

## 2018-11-12 PROCEDURE — 97116 GAIT TRAINING THERAPY: CPT

## 2018-11-12 PROCEDURE — 25000003 PHARM REV CODE 250: Performed by: STUDENT IN AN ORGANIZED HEALTH CARE EDUCATION/TRAINING PROGRAM

## 2018-11-12 PROCEDURE — 97110 THERAPEUTIC EXERCISES: CPT

## 2018-11-12 PROCEDURE — 80048 BASIC METABOLIC PNL TOTAL CA: CPT

## 2018-11-12 RX ORDER — SODIUM CHLORIDE 9 MG/ML
INJECTION, SOLUTION INTRAVENOUS CONTINUOUS
Status: DISCONTINUED | OUTPATIENT
Start: 2018-11-12 | End: 2018-11-14

## 2018-11-12 RX ADMIN — ACETAMINOPHEN 650 MG: 325 TABLET ORAL at 06:11

## 2018-11-12 RX ADMIN — MORPHINE SULFATE 15 MG: 15 TABLET, FILM COATED, EXTENDED RELEASE ORAL at 09:11

## 2018-11-12 RX ADMIN — SODIUM CHLORIDE: 0.9 INJECTION, SOLUTION INTRAVENOUS at 09:11

## 2018-11-12 RX ADMIN — VERAPAMIL HYDROCHLORIDE 240 MG: 240 TABLET, FILM COATED, EXTENDED RELEASE ORAL at 09:11

## 2018-11-12 RX ADMIN — MUPIROCIN 1 G: 20 OINTMENT TOPICAL at 09:11

## 2018-11-12 RX ADMIN — STANDARDIZED SENNA CONCENTRATE AND DOCUSATE SODIUM 1 TABLET: 8.6; 5 TABLET, FILM COATED ORAL at 09:11

## 2018-11-12 RX ADMIN — ENOXAPARIN SODIUM 40 MG: 100 INJECTION SUBCUTANEOUS at 06:11

## 2018-11-12 RX ADMIN — HYDROMORPHONE HYDROCHLORIDE 0.5 MG: 2 INJECTION INTRAMUSCULAR; INTRAVENOUS; SUBCUTANEOUS at 05:11

## 2018-11-12 RX ADMIN — ACETAMINOPHEN 650 MG: 325 TABLET ORAL at 12:11

## 2018-11-12 NOTE — PT/OT/SLP PROGRESS
Physical Therapy Treatment    Patient Name:  Apple Watson   MRN:  [id number]    Recommendations:     Discharge Recommendations:  nursing facility, skilled   Discharge Equipment Recommendations: bath bench, 3-in-1 commode, walker, rolling   Barriers to discharge: decreased mobility,strength and endurance    Assessment:     Apple Watson is a 89 y.o. female admitted with a medical diagnosis of Closed fracture of neck of right femur.  She presents with the following impairments/functional limitations:  weakness, impaired endurance, impaired functional mobilty, gait instability, impaired balance, decreased lower extremity function, pain, decreased ROM, impaired coordination, impaired skin, orthopedic precautions ,pt with good participation and requires Mod/Max A with bed mobility,pt lives alone and will benefit from SNF upon discharge.    Rehab Prognosis:  Good; patient would benefit from acute skilled PT services to address these deficits and reach maximum level of function.      Recent Surgery: Procedure(s) (LRB):  ARTHROPLASTY-HIP-NOLBERTO (Right) 2 Days Post-Op    Plan:     During this hospitalization, patient to be seen BID to address the above listed problems via gait training, therapeutic activities, therapeutic exercises  · Plan of Care Expires:  11/30/18   Plan of Care Reviewed with: patient, family    Subjective     Communicated with nsg prior to session.  Patient found supine upon PT entry to room, agreeable to treatment.      Chief Complaint: R hip pain  Patient comments/goals: pt agreeable to up in chair  Pain/Comfort:  · Pain Rating 1: 3/10  · Location - Side 1: Right  · Location - Orientation 1: generalized  · Location 1: hip  · Pain Addressed 1: Pre-medicate for activity, Reposition, Distraction  · Pain Rating Post-Intervention 1: 5/10    Patients cultural, spiritual, Yarsani conflicts given the current situation: None stated    Objective:     Patient found with: bed alarm, telemetry     General  Precautions: Standard, fall   Orthopedic Precautions:RLE weight bearing as tolerated   Braces: N/A     Functional Mobility:  · Bed Mobility:     · Supine to Sit: moderate assistance and maximal assistance  · Transfers:     · Sit to Stand:  minimum assistance with rolling walker  · Bed to Chair: moderate assistance with  rolling walker  using  ambulation  · Gait: amb ~15' X 1 with RW and Mod A  · Balance: fair standing balance with RW      AM-PAC 6 CLICK MOBILITY  Turning over in bed (including adjusting bedclothes, sheets and blankets)?: 2  Sitting down on and standing up from a chair with arms (e.g., wheelchair, bedside commode, etc.): 3  Moving from lying on back to sitting on the side of the bed?: 2  Moving to and from a bed to a chair (including a wheelchair)?: 2  Need to walk in hospital room?: 2  Climbing 3-5 steps with a railing?: 1  Basic Mobility Total Score: 12       Therapeutic Activities and Exercises: le supine ex's X 10-12 reps inc: ap,qs,hs,abd/add,slr with assistance on R. (PM RX)- up in chair ~over 4 hrs,sit-stand to RW with Mod A,chair to bed with RW ~3-4 steps and Min/ Mod A,sit-sup with Mod A,supine with daughter present and pt leaving with transport for testing.       Patient left up in chair with all lines intact, call button in reach, chair alarm on, nsg notified and family present..    GOALS: see general POC  Multidisciplinary Problems     Physical Therapy Goals        Problem: Physical Therapy Goal    Goal Priority Disciplines Outcome Goal Variances Interventions   Physical Therapy Goal     PT, PT/OT Ongoing (interventions implemented as appropriate)     Description:  Goals to be met by: 11/30/18     Patient will increase functional independence with mobility by performing:    Supine <> sit spvn  Sit<>stand spvn  Pt will ambulate with  feet with spvn  Pt will perform LE TE x 15 reps in supine and sitting  Pt will ascend and descend 1 step with RW and CGA                      Time  Tracking:     PT Received On: 11/12/18  PT Start Time: 0835   1349(PM)  PT Stop Time: 0905    1406(PM)  PT Total Time (min): 30 min     Billable Minutes: Gait Training 18 and Therapeutic Exercise 12    Treatment Type: Treatment  PT/PTA: PTA     PTA Visit Number: 2     Delmer Lopez, PTA  11/12/2018

## 2018-11-12 NOTE — PLAN OF CARE
received call from pt's daughter Liana stating her mother's preference is [facility].   will send referral to [facility]- admissions coordinator Raquel to review.    Update- Pt's daughter called  and provided second preference: [facility]. Referral sent to this facility via [facility].

## 2018-11-12 NOTE — PT/OT/SLP EVAL
Occupational Therapy   Evaluation    Name: Apple Watson  MRN: [id number]  Admitting Diagnosis:  Closed fracture of neck of right femur 1 Day Post-Op    Recommendations:     Discharge Recommendations: nursing facility, skilled  Discharge Equipment Recommendations:  walker, rolling, bath bench, 3-in-1 commode  Barriers to discharge:  Decreased caregiver support, Inaccessible home environment    History:     Occupational Profile:  Living Environment: Pt lives alone in H, tub/sh combo, 0STE, 1 step inside.  Previous level of function: Previously Mod I with all ADLs and functional mobility.  Equipment Used at Home:  shower chair, cane, straight  Assistance upon Discharge: Family    History reviewed. No pertinent past medical history.    Past Surgical History:   Procedure Laterality Date    APPENDECTOMY      GALLBLADDER SURGERY      HYSTERECTOMY      TONSILLECTOMY         Subjective     Chief Complaint: Pt c/o intense pain during PT session.  Patient/Family Comments/goals: To return to PLOF and to manage pain    Pain/Comfort:  · Pain Rating 1: (did not rate; grimace and heavy breathing noted)  · Location - Side 1: Right  · Location - Orientation 1: generalized  · Location 1: hip  · Pain Addressed 1: Reposition, Distraction, Cessation of Activity  · Pain Rating Post-Intervention 1: (did not rate)    Patients cultural, spiritual, Yarsani conflicts given the current situation:      Objective:     Communicated with: nsg prior to session.  Patient found all lines intact, call button in reach, bed alarm on, nsg notified and PCT and family present and bed alarm, telemetry, peripheral IV upon OT entry to room.    General Precautions: Standard, fall   Orthopedic Precautions:RLE weight bearing as tolerated   Braces: N/A     Occupational Performance:    Bed Mobility:    · Patient completed Rolling/Turning to Left with  total assistance and 3 persons  · Patient completed Scooting/Bridging with minimum assistance  · Patient  completed Supine to Sit with total assistance and 3 persons  · Patient completed Sit to Supine with maximal assistance     Functional Mobility/Transfers:  · Patient completed Sit <> Stand Transfer with minimum assistance  with  rolling walker   · Patient completed Toilet Transfer Step Transfer technique with minimum assistance with  rolling walker  · Functional Mobility: Pt with fair dynamic seated balance and fair- dynamic standing balance    Activities of Daily Living:  · Toileting: contact guard assistance for clothing mgmt and hygiene    Cognitive/Visual Perceptual:  Cognitive/Psychosocial Skills:     -       Oriented to: Person, Place, Time and Situation   -       Follows Commands/attention:Follows multistep  commands  -       Communication: clear/fluent  -       Memory: No Deficits noted  -       Safety awareness/insight to disability: intact   -       Mood/Affect/Coping skills/emotional control: Appropriate to situation and Anxious    Physical Exam:  Postural examination/scapula alignment:    -       Rounded shoulders  -       Forward head  Skin integrity: Wound surgical  Upper Extremity Range of Motion:    BUE WFL except R shoulder flex deficits ~0-75* and decreased external rotation  Upper Extremity Strength:   BUE grossly 3+/5 except R shoulder flexion and internal rotation 2/5   Strength:  B hand 4+/5  Fine Motor Coordination:    -    Pt unable to open toothpaste seated in bed but family states that she is able to open medications.  Gross motor coordination:   WFL    AMPAC 6 Click ADL:  AMPAC Total Score: 13    Treatment & Education:  Pt educated on role of OT and POC.   Pt performing skills as listed above.  Pt stated that she understands her needs for continued therapy and demonstrated understand of expectations in therapy. Pt with good participation this date but extremely limited tolerance for B rolling.  Education:    Patient left HOB elevated with all lines intact, call button in reach, bed  "alarm on, nsg notified and family present    Assessment:     Apple Watson is a 89 y.o. female with a medical diagnosis of Closed fracture of neck of right femur.  She presents with the following performance deficits affecting function: weakness, impaired self care skills, impaired balance, decreased ROM, impaired coordination, pain, decreased upper extremity function, impaired functional mobilty, impaired endurance, gait instability, decreased lower extremity function, orthopedic precautions, impaired skin.      Rehab Prognosis: Good; patient would benefit from acute skilled OT services to address these deficits and reach maximum level of function.         Clinical Decision Makin.  OT Mod:  "Pt evaluation falls under moderate complexity for evaluation coding due to identification of 3-5 performance deficits noted as stated above. Eval required Min/Mod assistance to complete on this date and detailed assessment(s) were utilized. Moreover, an expanded review of history and occupational profile obtained with additional review of cognitive, physical and psychosocial hx."     Plan:     Patient to be seen 5 x/week to address the above listed problems via self-care/home management, therapeutic activities, therapeutic exercises  · Plan of Care Expires: 18  · Plan of Care Reviewed with: patient, daughter    This Plan of care has been discussed with the patient who was involved in its development and understands and is in agreement with the identified goals and treatment plan    GOALS:   Multidisciplinary Problems     Occupational Therapy Goals        Problem: Occupational Therapy Goal    Goal Priority Disciplines Outcome Interventions   Occupational Therapy Goal     OT, PT/OT Ongoing (interventions implemented as appropriate)    Description:  Goals to be met by: 2018      Patient will increase functional independence with ADLs by performing:    UE Dressing with [place].  LE Dressing with Modified " [place].  Grooming while standing with Stand-by Assistance.  Toileting from toilet with Minimal Assistance for hygiene and clothing management.   Sitting at edge of bed x15 minutes with Modified [place].  Rolling to Bilateral with Stand-by Assistance.   Supine to sit with Stand-by Assistance.  Step transfer with Stand-by Assistance  Increased functional strength to WFL for self care.  Upper extremity exercise program x8 reps per handout, with assistance as needed.                      Time Tracking:     OT Date of Treatment: 11/11/18  OT Start Time: 1532(12:14-12:30 and 3:44-4:30 PM)  OT Stop Time: 1630  OT Total Time (min): 58 min    Billable Minutes:Evaluation 10  Self Care/Home Management 48    Sherry Damian OT  11/11/2018

## 2018-11-12 NOTE — CARE UPDATE
Pt's urine output was 15ML.Bladder scan found 24ML.  Dr NICK Madden team was contacted. No orders were given. Will continue to monitor

## 2018-11-12 NOTE — PROGRESS NOTES
[facility] Hospital Medicine Progress Note    Primary Team: [facility] Hospitalist Team A  Attending Physician: Anayeli Madden MD  Resident: Kostas  Intern: Kalia    Subjective:      Ms. Watson is a 88yo female that we are consulted on for pre-op prior to hip arthroplasty performed on 11/10 at [facility].     This morning, Ms Watson endorses feeling well. She has no complaints and slept well. Her incision site is clean, dry, and intact. She has minimal right leg pain improved with elevation. She has not had much of an appetite so will resume maintenance fluids. As per pt, she is voiding well. She has decided she wants to be discharged to SNF - will consult case management. Denies Fever, Chills, SOB, or Abd pain. She had several episodes of Afib overnight on tele, will follow repeat EKG. She denies symptoms.      Objective:     Last 24 Hour Vital Signs:  BP  Min: 104/61  Max: 166/106  Temp  Av.9 °F (36.6 °C)  Min: 97.6 °F (36.4 °C)  Max: 98.6 °F (37 °C)  Pulse  Av.7  Min: 63  Max: 135  Resp  Av.1  Min: 17  Max: 20  SpO2  Av %  Min: 95 %  Max: 97 %  Weight  Av.2 kg (139 lb 5.3 oz)  Min: 63.2 kg (139 lb 5.3 oz)  Max: 63.2 kg (139 lb 5.3 oz)  I/O last 3 completed shifts:  In: 926.3 [I.V.:926.3]  Out: 1165 [Urine:1165]    Physical Examination:  General appearance: alert, appears stated age and cooperative  Head: Normocephalic, without obvious abnormality, atraumatic  Eyes: conjunctivae/corneas clear. EOM's intact.  Throat: lips, mucosa, and tongue normal; teeth and gums normal, MMM  Lungs: clear to auscultation bilaterally, unlabored respirations, symmetric  Heart: irregularly irregular, S1, S2 normal  Abdomen: soft, non-tender; bowel sounds normal  Extremities: extremities normal, atraumatic, no cyanosis or edema, right hip tender and immobilized, incision site clean, dry, and intact  Pulses: 2+ and symmetric  Skin: Skin color, texture, turgor normal. No rashes or lesions  Neurologic: Grossly normal,  A&Ox4       Laboratory:  Laboratory Data Reviewed: yes  Pertinent Findings:  Recent Labs   Lab 11/09/18  1628 11/10/18  0459 11/11/18  0347 11/12/18  0444   WBC 11.83 9.58 16.18* 14.20*   HGB 13.2 12.6 11.2* 10.4*   HCT 40.8 39.6 35.3* 32.8*    231 206 223   MCV 91 90 91 91   RDW 12.6 12.6 12.8 12.8    138 136 134*   K 3.6 4.1 3.9 4.2    105 102 99   CO2 22* 24 23 24   BUN 15 10 10 26*   CREATININE 0.7 0.6 0.6 1.0   * 100 116* 104   CALCIUM 8.9 9.2 8.7 8.9   PROT 6.7  --   --   --    ALBUMIN 3.6  --   --   --    AST 18  --   --   --    ALT 12  --   --   --    ALKPHOS 115  --   --   --    BILITOT 0.6  --   --   --       Trended Cardiac Data:   Recent Labs   Lab 11/11/18  0347 11/11/18  0450 11/11/18  0930   TROPONINI 0.046* 0.053* 0.039*        Other Results:  EKG (my interpretation): sinus tachycardia, t-wave flattening, normal axis, PVCs    Radiology Data Reviewed: yes  Pertinent Findings:  11/10/18: Postsurgical changes of recent hip arthroplasty procedure. The orthopedic hardware appears in good position and alignment without adjacent fracture.    Current Medications:     Infusions:       Scheduled:   enoxaparin  40 mg Subcutaneous Daily    morphine  15 mg Oral Q12H    mupirocin  1 g Nasal BID    polyethylene glycol  17 g Oral Daily    senna-docusate 8.6-50 mg  1 tablet Oral BID    verapamil  240 mg Oral Daily        PRN:  acetaminophen, HYDROmorphone, ketorolac, ondansetron, oxyCODONE-acetaminophen, ramelteon, sodium chloride 0.9%, traMADol    Antibiotics and Day Number of Therapy:  Cefazoline x1 on 11/10 in surgery    Lines and Day Number of Therapy:  PIV - 11/9    Assessment:     Apple Watson is a 89 y.o.female with  Patient Active Problem List    Diagnosis Date Noted    NSTEMI (non-ST elevated myocardial infarction) 11/11/2018    Closed nondisplaced fracture of condyle of right femur     Fall     Closed fracture of neck of right femur 11/09/2018    Spondylolisthesis,  grade 2 03/01/2017    Aortic calcification 03/01/2017    White coat syndrome with diagnosis of hypertension 03/01/2017    Calcification of aorta 03/01/2017    Irregular heart beat 03/01/2017    Scoliosis 02/10/2016    Osteoporosis 02/10/2016    Arthritis 02/10/2016    Essential hypertension 02/10/2016        Plan:     Intermediate Troponin  - Pt normotensive overnight, trops peaked  - Troponin 0.046 >> 0.053 >>0.39  - Likely due to stress of surgery plus hypertensive episode from pain  - Overnight, Tele showed conversion to Afib several times, never sustained and pt asymptomatic, follow repeat EKG  - HR irregularily irregular on exam  - will establish care with cardiologist on discharge    Femur fracture - s/p hip arthroplasty 11/10  - fracture as above, Orthopedic surgery consulted  - will give dilaudid & tylenol for pain control  - Noted to have osteoporosis in chart, but no imaging available  - will recommend DEXA scan outpt  - Calcium & Vit D supplementation in setting of femur fracture     Irregular heart beat  - appreciated on my exam, will continue home verapamil and place ambulatory outpt consult for Cardiology  - monitor     Scoliosis  - Stable, will continue pain management as above, holding home medications     HCM  - HgA1c 5     Diet: regular  Ppx: Lovenox  Dispo: Discharge planning to SNF, medically stable    Mariluz Motta MD  [facility] Internal Medicine HO-I    [facility] Medicine Hospitalist Pager numbers:   [facility] Hospitalist Medicine Team A (Lidia/Maryanne): [phone]  [facility] Hospitalist Medicine Team B (Светлана/Wayne):  [phone]

## 2018-11-12 NOTE — PLAN OF CARE
met with patient and her daughter Liana in room. Pt was sitting in chair during assessment.  Both patient and her daughter are agreeable to SNF placement. Daughter given copy of SNF list. She will discuss it with her siblings and select a facility today.   explained process of SNF placement and explained sooner a facility was selected, the better.      put contact info on white board and provided dtr with business card.    Liana (dtr)  Home: [phone]  Cell: [phone]

## 2018-11-12 NOTE — PLAN OF CARE
Problem: Occupational Therapy Goal  Goal: Occupational Therapy Goal  Goals to be met by: 12/11/2018      Patient will increase functional independence with ADLs by performing:    UE Dressing with [place].  LE Dressing with Modified [place].  Grooming while standing with Stand-by Assistance.  Toileting from toilet with Minimal Assistance for hygiene and clothing management.   Sitting at edge of bed x15 minutes with Modified [place].  Rolling to Bilateral with Stand-by Assistance.   Supine to sit with Stand-by Assistance.  Step transfer with Stand-by Assistance  Increased functional strength to WFL for self care.  Upper extremity exercise program x8 reps per handout, with assistance as needed.     Outcome: Ongoing (interventions implemented as appropriate)  Pt agreeable to OT this date. Pt found up at b/s chair level & w/o c/o pain. Pt perf the following: sit-->stand via RW w/ Min-Mod A & nima'd static stand x ~1min; sit-->stand 2nd attempt via RW w/ Min-Mod A to achieve/maint & req'ed Max A for WSing & to phys A advancement of B LEs for t/f-->BSC w/ Max A; toileting tasks on BSC w/ Mod-max A; BSC-->b/s chair w/ Max A for WSing & advancement of B LEs for amb. Pt w/ 10/10 R hip pain at tx termination, but declined offer for ice pack. Edu/tx re: HEP. Pt/dgtr verbalized understanding.    Pt w/ signif pain w/ WBing & poor mobility skills this date. Cont w/ OT per POC for d/c-->SNF.

## 2018-11-12 NOTE — MEDICAL/APP STUDENT
[employer] IM Medical Student Progress Note    Admitting Team: [employer] Team A  Attending: Maryanne  Resident: Kostas  Intern: Kalia  Student: Heidy    Date of Admit:     Subjective:      Apple Watson is a 89 y.o. female who is being followed by the [employer] IM service at [facility] for ***     Objective:   Last 24 Hour Vital Signs:  BP  Min: 104/61  Max: 166/106  Temp  Av.9 °F (36.6 °C)  Min: 97.6 °F (36.4 °C)  Max: 98.6 °F (37 °C)  Pulse  Av.5  Min: 63  Max: 135  Resp  Av.2  Min: 17  Max: 20  SpO2  Av %  Min: 95 %  Max: 97 %  Weight  Av.2 kg (139 lb 5.3 oz)  Min: 63.2 kg (139 lb 5.3 oz)  Max: 63.2 kg (139 lb 5.3 oz)  I/O last 3 completed shifts:  In: 926.3 [I.V.:926.3]  Out: 1165 [Urine:1165]    Physical Examination:  Generall Appearance:  Alert, cooperative, no distress, appears stated age    Lungs:  Clear to auscultation bilaterally, respirations unlabored, No wheezes or crackles.    Chest Wall:  No tenderness or deformity    Heart:  Regular rate and rhythm, S1 and S2 normal, no murmur, rub or gallop    Abdomen:  Soft, non-tender, bowel sounds active all four quadrants,   no masses, no organomegaly    Extremities:  Extremities normal, atraumatic, no cyanosis or edema    Pulses:  2+ and symmetric all extremities    Skin:  Skin color, texture, turgor normal, no rashes or lesions    Neurologic:  CNI-XII intact, normal strength, sensation and reflexes   throughout       Laboratory:  Laboratory Data Reviewed:   Pertinent Findings:  Lab Results   Component Value Date    WBC 14.20 (H) 2018    HGB 10.4 (L) 2018    HCT 32.8 (L) 2018    MCV 91 2018     2018     CMP  Sodium   Date Value Ref Range Status   2018 134 (L) 136 - 145 mmol/L Final     Potassium   Date Value Ref Range Status   2018 4.2 3.5 - 5.1 mmol/L Final     Chloride   Date Value Ref Range Status   2018 99 95 - 110 mmol/L Final     CO2   Date Value Ref Range  Status   11/12/2018 24 23 - 29 mmol/L Final     Glucose   Date Value Ref Range Status   11/12/2018 104 70 - 110 mg/dL Final     BUN, Bld   Date Value Ref Range Status   11/12/2018 26 (H) 8 - 23 mg/dL Final     Creatinine   Date Value Ref Range Status   11/12/2018 1.0 0.5 - 1.4 mg/dL Final     Calcium   Date Value Ref Range Status   11/12/2018 8.9 8.7 - 10.5 mg/dL Final     Total Protein   Date Value Ref Range Status   11/09/2018 6.7 6.0 - 8.4 g/dL Final     Albumin   Date Value Ref Range Status   11/09/2018 3.6 3.5 - 5.2 g/dL Final     Total Bilirubin   Date Value Ref Range Status   11/09/2018 0.6 0.1 - 1.0 mg/dL Final     Comment:     For infants and newborns, interpretation of results should be based  on gestational age, weight and in agreement with clinical  observations.  Premature Infant recommended reference ranges:  Up to 24 hours.............<8.0 mg/dL  Up to 48 hours............<12.0 mg/dL  3-5 days..................<15.0 mg/dL  6-29 days.................<15.0 mg/dL       Alkaline Phosphatase   Date Value Ref Range Status   11/09/2018 115 55 - 135 U/L Final     AST   Date Value Ref Range Status   11/09/2018 18 10 - 40 U/L Final     ALT   Date Value Ref Range Status   11/09/2018 12 10 - 44 U/L Final     Anion Gap   Date Value Ref Range Status   11/12/2018 11 8 - 16 mmol/L Final     eGFR if    Date Value Ref Range Status   11/12/2018 58 (A) >60 mL/min/1.73 m^2 Final     eGFR if non    Date Value Ref Range Status   11/12/2018 50 (A) >60 mL/min/1.73 m^2 Final     Comment:     Calculation used to obtain the estimated glomerular filtration  rate (eGFR) is the CKD-EPI equation.            Other Results:  EKG: SVT with fusion complexes    Radiology Data Reviewed: yes  Pertinent Findings:  11/10: postsurgical changes of recent hip arthroplasty procedure. The orthopedic hardware appears in good position and alignment without adjacent fracture.    Current Medications:     Infusions:        Scheduled:   enoxaparin  40 mg Subcutaneous Daily    morphine  15 mg Oral Q12H    mupirocin  1 g Nasal BID    polyethylene glycol  17 g Oral Daily    senna-docusate 8.6-50 mg  1 tablet Oral BID    verapamil  240 mg Oral Daily        PRN:  acetaminophen, HYDROmorphone, ketorolac, ondansetron, oxyCODONE-acetaminophen, ramelteon, sodium chloride 0.9%, traMADol    Antibiotics and Day Number of Therapy:  Antibiotics (From admission, onward)    Start     Stop Route Frequency Ordered    11/10/18 1115  mupirocin 2 % ointment 1 g      11/15 0859 Nasl 2 times daily 11/10/18 1034          Lines and Day Number of Therapy:  PIV -11/9    Assessment:     Apple Watson is a 89 y.o.female with  Patient Active Problem List    Diagnosis Date Noted    NSTEMI (non-ST elevated myocardial infarction) 11/11/2018    Closed nondisplaced fracture of condyle of right femur     Fall     Closed fracture of neck of right femur 11/09/2018    Spondylolisthesis, grade 2 03/01/2017    Aortic calcification 03/01/2017    White coat syndrome with diagnosis of hypertension 03/01/2017    Calcification of aorta 03/01/2017    Irregular heart beat 03/01/2017    Scoliosis 02/10/2016    Osteoporosis 02/10/2016    Arthritis 02/10/2016    Essential hypertension 02/10/2016        Plan:     Femur Fracture s/p hip arthroplasty:  -Displaced Right femoral neck fracture  -Given PRN dilaudid and tylenol for pain control - insufficient  -Scheduled morphine, and PRN Tylenol, hydromorphone, ketorolac, Norco, tramadol  -[facility]  -Osteoporosis noted in chart  -Outpatient DEXA scan  -Will start Ca and vit D on discharge  -OT: pt would benefit from continued OT to address deficits in self care and functional mobility; recommending SNF    JENA:  -Nursing note 11/12: pts urine output 15mL, Bladder scan found 24mL  -Cr 1.0 - up from baseline of .6  -BUN 26 - up from baseline of 10  -GFR 50  -ordered IV NS 75ml/hr  -will  continue to monitor     H/O arrythmia:  -Unclear etiology  -Patient found to be hypertensive and tachycardic 11/11 AM, EKG reading of Afib with RVR - given IV metoprolol 5mg  -repeat EKG with sinus tachycardia and PVCs  -Contine home verapamil    Intermediate Troponin:  -11/11 found to be hypertensive and tachycardic with EKG reading of Afib with RVR  -Troponin 0.046 -> 0.053 -> 0.039  -Instructed patient to ask for PRN pain medications when needed    Scoliosis:  -Stable, will continue pain management as above, holding home medications    Diet: Regular  PPx: Lovenox  Dispo: Post op care / SNF vs home PT    Luis Antonio Moody, L3  [employer] of [employer]  [facility] IM Service Team A    [facility] Medicine Hospitalist Pager numbers:   [facility] Hospital[facility] Medicine Team A (Lidia/Maryanne): [phone]  [facility] Hospitalist Medicine Team B (Светлана/Wayne):  [phone]

## 2018-11-12 NOTE — PT/OT/SLP PROGRESS
Occupational Therapy   Treatment    Name: Apple Watson  MRN: [id number]  Admitting Diagnosis:  Closed fracture of neck of right femur  2 Days Post-Op    Recommendations:     Discharge Recommendations: nursing facility, skilled  Discharge Equipment Recommendations:  bath bench, 3-in-1 commode, walker, rolling  Barriers to discharge:  Decreased caregiver support, Inaccessible home environment    Subjective     Communicated with: nsg prior to session.  Pain/Comfort:  · Pain Rating 1: 0/10  · Location - Side 1: Right  · Location - Orientation 1: generalized  · Location 1: hip  · Pain Addressed 1: Pre-medicate for activity, Reposition, Distraction, Nurse notified  · Pain Rating Post-Intervention 1: 10/10    Patients cultural, spiritual, Hinduism conflicts given the current situation:      Objective:     Patient found with: telemetry(chair alarm)    General Precautions: Standard, fall   Orthopedic Precautions:RLE weight bearing as tolerated   Braces: N/A     Occupational Performance:    Bed Mobility:    ·      Functional Mobility/Transfers:  · Patient completed Sit <> Stand Transfer with minimum assistance and moderate assistance  with  rolling walker   · Patient completed Toilet Transfer Step Transfer technique with maximal assistance with  rolling walker and bedside commode  · Functional Mobility: Max A via RW    Activities of Daily Living:  · Toileting: moderate assistance and maximal assistance .    Patient left up in chair with all lines intact, call button in reach, chair alarm on, nsg notified and dgtr present    [facility] 6 Click:  [facility] Total Score:      Treatment & Education:  Pt agreeable to OT this date. Pt found up at b/s chair level & w/o c/o pain. Pt perf the following: sit-->stand via RW w/ Min-Mod A & nima'd static stand x ~1min; sit-->stand 2nd attempt via RW w/ Min-Mod A to achieve/maint & req'ed Max A for WSing & to phys A advancement of B LEs for t/f-->BSC w/ Max A; toileting tasks on BSC w/ Mod-max A;  BSC-->b/s chair w/ Max A for WSing & advancement of B LEs for amb. Pt w/ 10/10 R hip pain at tx termination, but declined offer for ice pack. Edu/tx re: HEP. Pt/dgtr verbalized understanding.      Education:    Assessment:   Pt w/ signif pain w/ WBing & poor mobility skills this date. Cont w/ OT per POC for d/c-->SNF.    Apple Watson is a 89 y.o. female with a medical diagnosis of Closed fracture of neck of right femur.  She presents with ..  Performance deficits affecting function are weakness, impaired endurance, gait instability, impaired functional mobilty, impaired self care skills, impaired balance, decreased lower extremity function, decreased coordination, decreased safety awareness, pain, decreased ROM, impaired skin, edema, orthopedic precautions.      Rehab Prognosis:  good; patient would benefit from acute skilled OT services to address these deficits and reach maximum level of function.       Plan:     Patient to be seen 5 x/week to address the above listed problems via self-care/home management, therapeutic activities, therapeutic exercises  · Plan of Care Expires: 12/11/18  · Plan of Care Reviewed with: patient, family    This Plan of care has been discussed with the patient who was involved in its development and understands and is in agreement with the identified goals and treatment plan    GOALS:   Multidisciplinary Problems     Occupational Therapy Goals        Problem: Occupational Therapy Goal    Goal Priority Disciplines Outcome Interventions   Occupational Therapy Goal     OT, PT/OT Ongoing (interventions implemented as appropriate)    Description:  Goals to be met by: 12/11/2018      Patient will increase functional independence with ADLs by performing:    UE Dressing with [place].  LE Dressing with Modified [place].  Grooming while standing with Stand-by Assistance.  Toileting from toilet with Minimal Assistance for hygiene and clothing management.   Sitting at edge of bed x15  minutes with Modified [place].  Rolling to Bilateral with Stand-by Assistance.   Supine to sit with Stand-by Assistance.  Step transfer with Stand-by Assistance  Increased functional strength to WFL for self care.  Upper extremity exercise program x8 reps per handout, with assistance as needed.                      Time Tracking:     OT Date of Treatment: 11/12/18  OT Start Time: 1057  OT Stop Time: 1141  OT Total Time (min): 44 min    Billable Minutes:Self Care/Home Management 44  Total Time 44    RACHAEL Alcantar  11/12/2018

## 2018-11-12 NOTE — PLAN OF CARE
Problem: Patient Care Overview  Goal: Plan of Care Review  Outcome: Ongoing (interventions implemented as appropriate)  Pt aaox3. Patient complained of pain and morphine was given.  No reports of nausea or vomiting.Pt is on telemetry and rhythm change to afib.  MD NICK Madden team  was notified. No new orders given. Hip incision is CDI and covered with no drainage. All meds were tolerated well. Remains on a reg adult diet. Will continue to monitor. Bed is in lowest position with call light in reach.

## 2018-11-12 NOTE — PROGRESS NOTES
Spoke to Dr NICK Madden team about pt's rhythm change to afib. Pt is asymptomatic. Stated understanding. No new orders for now. Will continue to monitor.

## 2018-11-13 PROBLEM — N17.9 AKI (ACUTE KIDNEY INJURY): Status: ACTIVE | Noted: 2018-11-13

## 2018-11-13 LAB
ANION GAP SERPL CALC-SCNC: 12 MMOL/L
AORTIC ROOT ANNULUS: 2.99 CM
AV MEAN GRADIENT: 9.73 MMHG
AV PEAK GRADIENT: 14.75 MMHG
AV VALVE AREA: 1.1 CM2
BASOPHILS # BLD AUTO: 0.02 K/UL
BASOPHILS NFR BLD: 0.2 %
BSA FOR ECHO PROCEDURE: 1.55 M2
BUN SERPL-MCNC: 46 MG/DL
CALCIUM SERPL-MCNC: 8.7 MG/DL
CHLORIDE SERPL-SCNC: 100 MMOL/L
CK SERPL-CCNC: 195 U/L
CO2 SERPL-SCNC: 21 MMOL/L
CREAT SERPL-MCNC: 1.5 MG/DL
CREAT UR-MCNC: 77.2 MG/DL
CV ECHO LV RWT: 0.49 CM
DIFFERENTIAL METHOD: ABNORMAL
DOP CALC AO PEAK VEL: 1.92 M/S
DOP CALC AO VTI: 31.56 CM
DOP CALC LVOT AREA: 2.54 CM2
DOP CALC LVOT DIAMETER: 1.8 CM
DOP CALC LVOT STROKE VOLUME: 34.74 CM3
DOP CALCLVOT PEAK VEL VTI: 13.66 CM
ECHO LV POSTERIOR WALL: 0.9 CM (ref 0.6–1.1)
EOSINOPHIL # BLD AUTO: 0.1 K/UL
EOSINOPHIL NFR BLD: 1 %
ERYTHROCYTE [DISTWIDTH] IN BLOOD BY AUTOMATED COUNT: 12.7 %
EST. GFR  (AFRICAN AMERICAN): 35 ML/MIN/1.73 M^2
EST. GFR  (NON AFRICAN AMERICAN): 31 ML/MIN/1.73 M^2
FRACTIONAL SHORTENING: 34 % (ref 28–44)
GLUCOSE SERPL-MCNC: 101 MG/DL
HCT VFR BLD AUTO: 34.3 %
HGB BLD-MCNC: 10.9 G/DL
INTERVENTRICULAR SEPTUM: 0.71 CM (ref 0.6–1.1)
IVRT: 0.09 MSEC
LA MAJOR: 6.02 CM
LA MINOR: 5.86 CM
LA WIDTH: 4.45 CM
LEFT ATRIUM SIZE: 4.12 CM
LEFT ATRIUM VOLUME INDEX: 59.7 ML/M2
LEFT ATRIUM VOLUME: 92.55 CM3
LEFT INTERNAL DIMENSION IN SYSTOLE: 2.45 CM (ref 2.1–4)
LEFT VENTRICLE DIASTOLIC VOLUME INDEX: 37.53 ML/M2
LEFT VENTRICLE DIASTOLIC VOLUME: 58.17 ML
LEFT VENTRICLE MASS INDEX: 53.6 G/M2
LEFT VENTRICLE SYSTOLIC VOLUME INDEX: 13.7 ML/M2
LEFT VENTRICLE SYSTOLIC VOLUME: 21.23 ML
LEFT VENTRICULAR INTERNAL DIMENSION IN DIASTOLE: 3.7 CM (ref 3.5–6)
LEFT VENTRICULAR MASS: 83.02 G
LYMPHOCYTES # BLD AUTO: 2.8 K/UL
LYMPHOCYTES NFR BLD: 24.2 %
MCH RBC QN AUTO: 29.1 PG
MCHC RBC AUTO-ENTMCNC: 31.8 G/DL
MCV RBC AUTO: 92 FL
MONOCYTES # BLD AUTO: 1.5 K/UL
MONOCYTES NFR BLD: 13.1 %
NEUTROPHILS # BLD AUTO: 7 K/UL
NEUTROPHILS NFR BLD: 61.2 %
PISA TR MAX VEL: 2.63 M/S
PLATELET # BLD AUTO: 280 K/UL
PMV BLD AUTO: 10 FL
POTASSIUM SERPL-SCNC: 3.8 MMOL/L
PULM VEIN S/D RATIO: 1.14
PV PEAK D VEL: 0.22 M/S
PV PEAK S VEL: 0.25 M/S
RA MAJOR: 5.84 CM
RA PRESSURE: 3 MMHG
RBC # BLD AUTO: 3.75 M/UL
RIGHT VENTRICULAR END-DIASTOLIC DIMENSION: 2.56 CM
SODIUM SERPL-SCNC: 133 MMOL/L
SODIUM UR-SCNC: <20 MMOL/L
TR MAX PG: 27.67 MMHG
TV REST PULMONARY ARTERY PRESSURE: 30.67 MMHG
WBC # BLD AUTO: 11.51 K/UL

## 2018-11-13 PROCEDURE — 63600175 PHARM REV CODE 636 W HCPCS: Performed by: INTERNAL MEDICINE

## 2018-11-13 PROCEDURE — 97110 THERAPEUTIC EXERCISES: CPT

## 2018-11-13 PROCEDURE — 82550 ASSAY OF CK (CPK): CPT

## 2018-11-13 PROCEDURE — 82570 ASSAY OF URINE CREATININE: CPT

## 2018-11-13 PROCEDURE — 25000003 PHARM REV CODE 250: Performed by: STUDENT IN AN ORGANIZED HEALTH CARE EDUCATION/TRAINING PROGRAM

## 2018-11-13 PROCEDURE — 97116 GAIT TRAINING THERAPY: CPT

## 2018-11-13 PROCEDURE — 36415 COLL VENOUS BLD VENIPUNCTURE: CPT

## 2018-11-13 PROCEDURE — 11000001 HC ACUTE MED/SURG PRIVATE ROOM

## 2018-11-13 PROCEDURE — 94761 N-INVAS EAR/PLS OXIMETRY MLT: CPT

## 2018-11-13 PROCEDURE — 97535 SELF CARE MNGMENT TRAINING: CPT

## 2018-11-13 PROCEDURE — 80048 BASIC METABOLIC PNL TOTAL CA: CPT

## 2018-11-13 PROCEDURE — 84300 ASSAY OF URINE SODIUM: CPT

## 2018-11-13 PROCEDURE — 85025 COMPLETE CBC W/AUTO DIFF WBC: CPT

## 2018-11-13 PROCEDURE — 97530 THERAPEUTIC ACTIVITIES: CPT

## 2018-11-13 PROCEDURE — 25000003 PHARM REV CODE 250

## 2018-11-13 RX ORDER — ENOXAPARIN SODIUM 100 MG/ML
30 INJECTION SUBCUTANEOUS EVERY 24 HOURS
Status: DISCONTINUED | OUTPATIENT
Start: 2018-11-13 | End: 2018-11-14

## 2018-11-13 RX ADMIN — MUPIROCIN 1 G: 20 OINTMENT TOPICAL at 10:11

## 2018-11-13 RX ADMIN — MORPHINE SULFATE 15 MG: 15 TABLET, FILM COATED, EXTENDED RELEASE ORAL at 10:11

## 2018-11-13 RX ADMIN — TRAMADOL HYDROCHLORIDE 50 MG: 50 TABLET, COATED ORAL at 04:11

## 2018-11-13 RX ADMIN — SODIUM CHLORIDE: 0.9 INJECTION, SOLUTION INTRAVENOUS at 10:11

## 2018-11-13 RX ADMIN — MUPIROCIN 1 G: 20 OINTMENT TOPICAL at 09:11

## 2018-11-13 RX ADMIN — STANDARDIZED SENNA CONCENTRATE AND DOCUSATE SODIUM 1 TABLET: 8.6; 5 TABLET, FILM COATED ORAL at 10:11

## 2018-11-13 RX ADMIN — VERAPAMIL HYDROCHLORIDE 240 MG: 240 TABLET, FILM COATED, EXTENDED RELEASE ORAL at 09:11

## 2018-11-13 RX ADMIN — MORPHINE SULFATE 15 MG: 15 TABLET, FILM COATED, EXTENDED RELEASE ORAL at 09:11

## 2018-11-13 RX ADMIN — SODIUM CHLORIDE: 0.9 INJECTION, SOLUTION INTRAVENOUS at 05:11

## 2018-11-13 RX ADMIN — ENOXAPARIN SODIUM 30 MG: 100 INJECTION SUBCUTANEOUS at 05:11

## 2018-11-13 NOTE — PLAN OF CARE
Problem: Patient Care Overview  Goal: Plan of Care Review  Patient has complaints of pain of the right hip. Medicine given. No n/v. Patient put  out 75 mls of urine. IV is clean, dry, intact, and infusing.

## 2018-11-13 NOTE — PROGRESS NOTES
"[facility]  Orthopedics  Progress Note    Patient Name: Apple Watson  MRN: [id number]  Admission Date: 11/9/2018  Hospital Length of Stay: 3 days  Attending Provider: Anayeli Madden MD  Primary Care Provider: Tree Rolon MD  Follow-up For: Procedure(s) (LRB):  ARTHROPLASTY-HIP-NOLBERTO (Right)    Post-Operative Day: 2 Days Post-Op  Subjective:     Principal Problem:Closed fracture of neck of right femur    Principal Orthopedic Problem: post op left hip nolberto-arthroplasty    Interval History: complaining of moderate pain with transfers    Review of patient's allergies indicates:   Allergen Reactions    Celebrex [celecoxib] Other (See Comments)     Blood in bowel    Cymbalta [duloxetine]     Iodine and iodide containing products     Lovastatin     Pneumococcal 23-gregorio ps vaccine     Sulpho-lac [sulfur]     Vioxx [rofecoxib]     Savella [milnacipran] Rash       Current Facility-Administered Medications   Medication    0.9%  NaCl infusion    acetaminophen tablet 650 mg    enoxaparin injection 40 mg    hydromorphone (PF) injection 0.5 mg    ketorolac injection 15 mg    morphine 12 hr tablet 15 mg    mupirocin 2 % ointment 1 g    ondansetron injection 4 mg    oxyCODONE-acetaminophen 5-325 mg per tablet 1 tablet    polyethylene glycol packet 17 g    ramelteon tablet 8 mg    senna-docusate 8.6-50 mg per tablet 1 tablet    sodium chloride 0.9% flush 3 mL    traMADol tablet 50 mg    tuberculin injection 5 Units    verapamil CR tablet 240 mg     Objective:     Vital Signs (Most Recent):  Temp: 97.7 °F (36.5 °C) (11/12/18 1652)  Pulse: 62 (11/12/18 1652)  Resp: (!) 2 (11/12/18 1652)  BP: (!) 101/53 (11/12/18 1652)  SpO2: 96 % (11/12/18 1500) Vital Signs (24h Range):  Temp:  [97.6 °F (36.4 °C)-98.7 °F (37.1 °C)] 97.7 °F (36.5 °C)  Pulse:  [62-89] 62  Resp:  [2-20] 2  SpO2:  [96 %-97 %] 96 %  BP: (101-136)/(53-85) 101/53     Weight: 63.2 kg (139 lb 5.3 oz)  Height: 4' 10" (147.3 " cm)  Body mass index is 29.12 kg/m².      Intake/Output Summary (Last 24 hours) at 11/12/2018 1833  Last data filed at 11/12/2018 1200  Gross per 24 hour   Intake --   Output 90 ml   Net -90 ml       Ortho/SPM ExamIncision rt hip intact   No drainage, NVI, homans neg    Significant Labs:   CBC:   Recent Labs   Lab 11/11/18  0347 11/12/18  0444   WBC 16.18* 14.20*   HGB 11.2* 10.4*   HCT 35.3* 32.8*    223     All pertinent labs within the past 24 hours have been reviewed.    Significant Imaging: None    Assessment/Plan:     Active Diagnoses:    Diagnosis Date Noted POA    PRINCIPAL PROBLEM:  Closed fracture of neck of right femur [S72.001A] 11/09/2018 Yes    Elevated troponin [R74.8]  Yes    NSTEMI (non-ST elevated myocardial infarction) [I21.4] 11/11/2018 No    Closed nondisplaced fracture of condyle of right femur [S72.414A]  Yes    Fall [W19.XXXA]  Yes    Irregular heart beat [I49.9] 03/01/2017 Yes    Scoliosis [M41.9] 02/10/2016 Yes    Osteoporosis [M81.0] 02/10/2016 Yes      Problems Resolved During this Admission:    Diagnosis Date Noted Date Resolved POA    Fracture of femur [S72.90XA] 11/09/2018 11/10/2018 Yes    post op left hip freddie-arthroplasty  Plan- continue to mobilize as tolerates.  Will need SNF when medically stable         Nasir Danielle MD  Orthopedics  [facility]

## 2018-11-13 NOTE — PROGRESS NOTES
[facility] Hospital Medicine Progress Note    Primary Team: [facility] Hospitalist Team A  Attending Physician: Anayeli Madden MD  Resident: Kostas  Intern: Kalia    Subjective:      Ms. Watson is a 89yo female admitted to [facility] on for pre-op prior to hip arthroplasty performed on 11/10 at [facility].     This morning, Ms Watson endorses feeling well. She has no pain but endorses minimal soreness. She is voiding and stooling. She has no complaints and slept well. Her incision site is clean, dry, and intact. She states her appetite has improved, will discontinue fluids in preparation for discharge today. No reports of afib overnight. Will follow with Social Work regarding transport to SNF today. Denies Fever, Chills, SOB, or Abd pain.     Objective:     Last 24 Hour Vital Signs:  BP  Min: 92/54  Max: 136/73  Temp  Av.8 °F (36.6 °C)  Min: 97 °F (36.1 °C)  Max: 98.7 °F (37.1 °C)  Pulse  Av.5  Min: 54  Max: 109  Resp  Av  Min: 2  Max: 20  SpO2  Av.8 %  Min: 95 %  Max: 96 %  Weight  Av.5 kg (135 lb 9.3 oz)  Min: 61.5 kg (135 lb 9.3 oz)  Max: 61.5 kg (135 lb 9.3 oz)  I/O last 3 completed shifts:  In: 1587.5 [P.O.:120; I.V.:1467.5]  Out: 265 [Urine:265]    Physical Examination:  General appearance: alert, appears stated age and cooperative  Head: Normocephalic, without obvious abnormality, atraumatic  Eyes: conjunctivae/corneas clear. EOM's intact.  Throat: lips, mucosa, and tongue normal; teeth and gums normal, MMM  Lungs: clear to auscultation bilaterally, unlabored respirations, symmetric  Heart:regular rate and rhythm, S1, S2 normal  Abdomen: soft, non-tender; bowel sounds normal  Extremities: extremities normal, atraumatic, no cyanosis or edema, right hip tender and immobilized, incision site clean, dry, and intact  Pulses: 2+ and symmetric  Skin: Skin color, texture, turgor normal. No rashes or lesions  Neurologic: Grossly normal, A&Ox4       Laboratory:  Laboratory Data Reviewed:  yes  Pertinent Findings:  Recent Labs   Lab 11/09/18  1628 11/10/18  0459 11/11/18  0347 11/12/18  0444 11/13/18  0446   WBC 11.83 9.58 16.18* 14.20* 11.51   HGB 13.2 12.6 11.2* 10.4* 10.9*   HCT 40.8 39.6 35.3* 32.8* 34.3*    231 206 223 280   MCV 91 90 91 91 92   RDW 12.6 12.6 12.8 12.8 12.7    138 136 134*  --    K 3.6 4.1 3.9 4.2  --     105 102 99  --    CO2 22* 24 23 24  --    BUN 15 10 10 26*  --    CREATININE 0.7 0.6 0.6 1.0  --    * 100 116* 104  --    CALCIUM 8.9 9.2 8.7 8.9  --    PROT 6.7  --   --   --   --    ALBUMIN 3.6  --   --   --   --    AST 18  --   --   --   --    ALT 12  --   --   --   --    ALKPHOS 115  --   --   --   --    BILITOT 0.6  --   --   --   --       Trended Cardiac Data:   Recent Labs   Lab 11/11/18  0347 11/11/18  0450 11/11/18  0930   TROPONINI 0.046* 0.053* 0.039*        Other Results:  EKG (my interpretation): sinus tachycardia, t-wave flattening, normal axis, PVCs    Radiology Data Reviewed: yes  Pertinent Findings:  11/10/18: Postsurgical changes of recent hip arthroplasty procedure. The orthopedic hardware appears in good position and alignment without adjacent fracture.    Current Medications:     Infusions:   sodium chloride 0.9% 75 mL/hr at 11/13/18 0505        Scheduled:   enoxaparin  40 mg Subcutaneous Daily    morphine  15 mg Oral Q12H    mupirocin  1 g Nasal BID    polyethylene glycol  17 g Oral Daily    senna-docusate 8.6-50 mg  1 tablet Oral BID    tuberculin  5 Units Intradermal Once    verapamil  240 mg Oral Daily        PRN:  acetaminophen, HYDROmorphone, ketorolac, ondansetron, oxyCODONE-acetaminophen, ramelteon, sodium chloride 0.9%, traMADol    Antibiotics and Day Number of Therapy:  Cefazoline x1 on 11/10 in surgery    Lines and Day Number of Therapy:  PIV - 11/9    Assessment:     Apple Watson is a 89 y.o.female with  Patient Active Problem List    Diagnosis Date Noted    Elevated troponin     NSTEMI (non-ST elevated  myocardial infarction) 11/11/2018    Closed nondisplaced fracture of condyle of right femur     Fall     Closed fracture of neck of right femur 11/09/2018    Spondylolisthesis, grade 2 03/01/2017    Aortic calcification 03/01/2017    White coat syndrome with diagnosis of hypertension 03/01/2017    Calcification of aorta 03/01/2017    Irregular heart beat 03/01/2017    Scoliosis 02/10/2016    Osteoporosis 02/10/2016    Arthritis 02/10/2016    Essential hypertension 02/10/2016        Plan:     Intermediate Troponin - resolved  - Pt normotensive overnight, trops peaked  - Troponin 0.046 >> 0.053 >>0.39  - Likely due to stress of surgery plus hypertensive episode from pain  - HR previously irregularily irregular on exam, today RRR  - continue on home med verapamil  - will establish care with cardiologist on discharge    Femur fracture - s/p hip arthroplasty 11/10  - fracture as above, Orthopedic surgery consulted  - will give dilaudid & tylenol for pain control  - Noted to have osteoporosis in chart, but no imaging available  - will recommend DEXA scan outpt  - Calcium & Vit D supplementation in setting of femur fracture  - granda removed, voiding and stooling  - amenable to SNF placement upon discharge, Social Work consulted and making arrangements according to family preferances     JENA  - Cr 0.6 on admission -->1.0 --> 1.5 today  - not on any nephrotoxic drugs  - On LR  - encouraging PO intake  - follow up Urine lytes  - follow up KUB  - voiding well  - BP stable    Irregular heart beat  - appreciated previously on my exam, will continue home verapamil and place ambulatory outpt consult for Cardiology  - monitor  - Echo: LV EF 60%, RV function normal, LA severely dilated, normal CVP (3mm Hg), PA pressure 30.67, mild aortic valve stenosis     Scoliosis  - Stable, will continue pain management as above, holding home medications     HCM  - HgA1c 5, no need for intervention     Diet: regular  Ppx:  Loveno  Dispo: Today to SNF, medically stable    Mariluz Motta MD  [facility]    [facility] Medicine Hospitalist Pager numbers:   [facility] Hospitalist Medicine Team A (Lidia/Maryanne): [phone]  [facility] Hospitalist Medicine Team B (Светлана/Wayne):  [phone]

## 2018-11-13 NOTE — PLAN OF CARE
Patient medically accepted to [facility]. Pt's daughter completed admission paperwork today.   sent SNF referral to [facility] via [facility] to receive insurance authorization.    CM will plan for discharge to [facility] on tomorrow.

## 2018-11-13 NOTE — PT/OT/SLP PROGRESS
Physical Therapy Treatment    Patient Name:  Apple Watson   MRN:  [id number]    Recommendations:     Discharge Recommendations:  nursing facility, skilled   Discharge Equipment Recommendations: (defer to SNF)   Barriers to discharge: decreased mobility and endurance    Assessment:     Apple Watson is a 89 y.o. female admitted with a medical diagnosis of Closed fracture of neck of right femur.  She presents with the following impairments/functional limitations:  weakness, impaired endurance, impaired functional mobilty, gait instability, impaired balance, decreased lower extremity function, pain, decreased ROM, impaired coordination, impaired skin, orthopedic precautions ,pt with good participation and requires assistance for safety for mobility,pt with good sitting tolerance and will benefit from SNF upon discharge.    Rehab Prognosis:  Good; patient would benefit from acute skilled PT services to address these deficits and reach maximum level of function.      Recent Surgery: Procedure(s) (LRB):  ARTHROPLASTY-HIP-NOLBERTO (Right) 3 Days Post-Op    Plan:     During this hospitalization, patient to be seen BID to address the above listed problems via gait training, therapeutic activities, therapeutic exercises  · Plan of Care Expires:  11/30/18   Plan of Care Reviewed with: patient, daughter    Subjective     Communicated with nsg prior to session.  Patient found supine upon PT entry to room, agreeable to treatment.      Chief Complaint: n/a  Patient comments/goals: pt not having much pain  Pain/Comfort:  · Pain Rating 1: 3/10  · Location - Side 1: Right  · Location - Orientation 1: generalized  · Location 1: hip  · Pain Addressed 1: Reposition, Distraction, Nurse notified  · Pain Rating Post-Intervention 1: 3/10    Patients cultural, spiritual, Baptism conflicts given the current situation: None stated    Objective:     Patient found with: telemetry     General Precautions: Standard, fall   Orthopedic  Precautions:RLE weight bearing as tolerated   Braces: N/A     Functional Mobility:  · Bed Mobility:     · Supine to Sit: moderate assistance  · Transfers:     · Sit to Stand:  minimum assistance and X 2 trials with rolling walker  · Bed to Chair: minimum assistance with  rolling walker  using  ambulation  · Toilet Transfer: minimum assistance with  rolling walker  using  step transfer  · Gait: amb ~18' X 1 with RW and Min A  · Balance: fair standing balance with RW      AM-PAC 6 CLICK MOBILITY  Turning over in bed (including adjusting bedclothes, sheets and blankets)?: 2  Sitting down on and standing up from a chair with arms (e.g., wheelchair, bedside commode, etc.): 3  Moving from lying on back to sitting on the side of the bed?: 2  Moving to and from a bed to a chair (including a wheelchair)?: 3  Need to walk in hospital room?: 3  Climbing 3-5 steps with a railing?: 1  Basic Mobility Total Score: 14       Therapeutic Activities and Exercises: le supine ex's X 10-12 reps inc: ap,qs,hs,abd/add,slr       (PM RX) Sit-stand to RW with Min A,pt amb ~16' X 1 with RW and Min A,sit-sup with Mod/Max a at le's,rx stopped secondary to phone call from SNF staff to speak with pt.       Patient left up in chair with all lines intact, call button in reach, nsg notified and daughter present..    GOALS: see general POC  Multidisciplinary Problems     Physical Therapy Goals        Problem: Physical Therapy Goal    Goal Priority Disciplines Outcome Goal Variances Interventions   Physical Therapy Goal     PT, PT/OT Ongoing (interventions implemented as appropriate)     Description:  Goals to be met by: 11/30/18     Patient will increase functional independence with mobility by performing:    Supine <> sit spvn  Sit<>stand spvn  Pt will ambulate with  feet with spvn  Pt will perform LE TE x 15 reps in supine and sitting  Pt will ascend and descend 1 step with RW and CGA                      Time Tracking:     PT Received On:  11/13/18  PT Start Time: 0810   1437(PM)  PT Stop Time: 0848    1451(PM)  PT Total Time (min): 38 min     Billable Minutes: Gait Training 16, Therapeutic Activity 11 and Therapeutic Exercise 11    Treatment Type: Treatment  PT/PTA: PTA     PTA Visit Number: 2   vs #3     Delmer Lopez, PTA  11/13/2018

## 2018-11-13 NOTE — PROGRESS NOTES
Pharmacist Renal Dose Adjustment Note    Apple Watson is a 89 y.o. female being treated with the medication enoxaparin    Patient Data:    Vital Signs (Most Recent):  Temp: 98 °F (36.7 °C) (11/13/18 0848)  Pulse: 93 (11/13/18 1225)  Resp: 20 (11/13/18 0848)  BP: 128/68 (11/13/18 1229)  SpO2: 95 % (11/13/18 1225) Vital Signs (72h Range):  Temp:  [97 °F (36.1 °C)-98.9 °F (37.2 °C)]   Pulse:  []   Resp:  [2-20]   BP: ()/()   SpO2:  [93 %-98 %]      Recent Labs   Lab 11/11/18  0347 11/12/18  0444 11/13/18  0446   CREATININE 0.6 1.0 1.5*     Serum creatinine: 1.5 mg/dL (H) 11/13/18 0446  Estimated creatinine clearance: 24.7 mL/min (A)    Medication enoxaparin dose: 40mg frequency daily will be changed to medication enoxaparin dose 30mg frequency daily    Pharmacist's Name: Mica Bolton  Pharmacist's Extension: [phone]

## 2018-11-13 NOTE — PLAN OF CARE
[facility]    FACILITY TRANSFER ORDERS      Patient Name: Apple Watson  YOB: 1929    PCP: Tree Rolon MD   PCP Address: [address] / [place] [address]  PCP Phone Number: [phone]  PCP Fax: [phone]    Encounter Date: 11/14/2018    Admit to: [facility]    Vital Signs:  Routine    Diagnoses:   Active Hospital Problems    Diagnosis  POA    *Closed fracture of neck of right femur [S72.001A]  Yes    JENA (acute kidney injury) [N17.9]  Yes    Elevated troponin [R74.8]  Yes    NSTEMI (non-ST elevated myocardial infarction) [I21.4]  No    Closed nondisplaced fracture of condyle of right femur [S72.414A]  Yes    Fall [W19.XXXA]  Yes    Irregular heart beat [I49.9]  Yes    Scoliosis [M41.9]  Yes    Osteoporosis [M81.0]  Yes      Resolved Hospital Problems    Diagnosis Date Resolved POA    Fracture of femur [S72.90XA] 11/10/2018 Yes       Allergies:  Review of patient's allergies indicates:   Allergen Reactions    Celebrex [celecoxib] Other (See Comments)     Blood in bowel    Cymbalta [duloxetine]     Iodine and iodide containing products     Lovastatin     Pneumococcal 23-gregorio ps vaccine     Sulpho-lac [sulfur]     Vioxx [rofecoxib]     Savella [milnacipran] Rash       Diet: regular diet    Activities: Activity as tolerated     CONSULTS:    Physical Therapy to evaluate and treat.  and Occupational Therapy to evaluate and treat.    MISCELLANEOUS CARE:  s/p R hip arthroplasty, incision site does not require specific wound care    WOUND CARE ORDERS  None    Medications: Review discharge medications with patient and family and provide education.      Current Discharge Medication List      START taking these medications    Details   calcium carb/vit D3/minerals (CALCIUM CARBONATE-VIT D3-MIN) 600 mg (1,500 mg)-400 unit Chew Take 1 tablet by mouth once daily.  Qty: 30 each, Refills: 3         CONTINUE these medications which have NOT CHANGED    Details    amitriptyline (ELAVIL) 10 MG tablet Take 1 tablet (10 mg total) by mouth 2 (two) times daily.  Qty: 180 tablet, Refills: 3      cyanocobalamin (VITAMIN B-12) 250 MCG tablet Take 250 mcg by mouth once daily.      ergocalciferol (VITAMIN D2) 50,000 unit Cap Take 50,000 Units by mouth every 7 days.      gabapentin (NEURONTIN) 300 MG capsule Take 1 capsule (300 mg total) by mouth every evening.  Qty: 30 capsule, Refills: 11      Norco 10-325mg Take one tablet q6 hours prn pain 7-10  Qty 8 tablets, refills 0      verapamil (CALAN-SR) 240 MG CR tablet Take 1 tablet (240 mg total) by mouth once daily.  Qty: 90 tablet, Refills: 3      vitamin A 8000 UNIT capsule Take 8,000 Units by mouth once daily.            Patient has a pain contract for pain medications. Getting 2 days of pain medication and then will need to see SNF physician to continue pain medication.       _________________________________  Mariluz Motta MD  11/13/2018

## 2018-11-13 NOTE — PHYSICIAN QUERY
PT Name: Apple Watson  MR #: [id number]    Physician Query Form - Atrial Fibrillation Specificity     CDS/: Pat Lopez               Contact information:[phone]     This form is a permanent document in the medical record.     Query Date: November 13, 2018    By submitting this query, we are merely seeking further clarification of documentation. Please utilize your independent clinical judgment when addressing the question(s) below.    The medical record contains the following:   Indicators     Supporting Clinical Findings Location in Medical Record    x Atrial Fibrillation  EKG showed atrial fibrillation with rapid ventricular response.     She had several episodes of Afib overnight on tele, will follow repeat EKG. She denies symptoms.         Internal Medicine Plan of Care Note 11/11/2018    Internal Medicine Progress Note 11/12/2018    x EKG results  Atrial fibrillation with rapid ventricular response with premature  ventricular or aberrantly conducted complexes  Atrial fibrillation  Low voltage QRS  Atrial fibrillation with aberrant complexes  Low voltage QRS    Atrial fibrillation  Incomplete left bundle branch block  Abnormal ECG  EKG Results 11/11/2018                    EKG Results 11/12/2018      x Medication  - patient to receive metoprolol 5 mg IV for rate control      Internal Medicine Plan of Care Note 11/11/2018    x Treatment  patient given metoprolol 5 IV, HR responded with rates between 90-100s on telemetry.        Internal Medicine Plan of Care  11/11/2018    Other         Provider, please further specify the Atrial Fibrillation diagnosis.    x Paroxysmal    Persistent    Other (please specify):    Clinically Undetermined       Please document in your progress notes daily for the duration of treatment until resolved, and include in your discharge summary.

## 2018-11-13 NOTE — MEDICAL/APP STUDENT
[employer] IM Medical Student Progress Note    Admitting Team: [employer] Team A  Attending: Maryanne  Resident: Kostas  Intern: Kalia  Student: Heidy    Date of Admit:     Subjective:      Patient says she is feeling much better and had a good night of sleep. She says she is eating and drinking better and is ready for discharge to SNF. She denies pain in her hip and leg, but does complain of some soreness. No episodes of afib overnight. Denies any CP, SOB, Abdominal pain, N/V.     Objective:   Last 24 Hour Vital Signs:  BP  Min: 92/54  Max: 136/73  Temp  Av.8 °F (36.6 °C)  Min: 97 °F (36.1 °C)  Max: 98.7 °F (37.1 °C)  Pulse  Av.6  Min: 54  Max: 109  Resp  Av.1  Min: 2  Max: 20  SpO2  Av.3 %  Min: 95 %  Max: 98 %  Weight  Av.5 kg (135 lb 9.3 oz)  Min: 61.5 kg (135 lb 9.3 oz)  Max: 61.5 kg (135 lb 9.3 oz)  I/O last 3 completed shifts:  In: 1587.5 [P.O.:120; I.V.:1467.5]  Out: 265 [Urine:265]    Physical Examination:    General: alert, cooperative, in no apparent distress  HEENT: normocephalic, atraumatic, EOM intact, PERRL  Cardiovascular:  RRR, Normal S1 and S2, no murmur or rubs noted  Lungs: CTAB, no wheezes or crackles  Abdomen: soft, non-tender, normal bowel sounds, no guarding or rebound tenderness  Extremities: No cyanosis or edema, tenderness of right hip  Skin: No rashes or lesions noted  Neurologic: CNII-XII grossly intact, A&Ox4    Laboratory:  Laboratory Data Reviewed: Yes  Pertinent Findings:  Lab Results   Component Value Date    WBC 11.51 2018    HGB 10.9 (L) 2018    HCT 34.3 (L) 2018    MCV 92 2018     2018     CMP  Sodium   Date Value Ref Range Status   2018 133 (L) 136 - 145 mmol/L Final     Potassium   Date Value Ref Range Status   2018 3.8 3.5 - 5.1 mmol/L Final     Chloride   Date Value Ref Range Status   2018 100 95 - 110 mmol/L Final     CO2   Date Value Ref Range Status   2018 21 (L) 23 - 29 mmol/L Final      Glucose   Date Value Ref Range Status   11/13/2018 101 70 - 110 mg/dL Final     BUN, Bld   Date Value Ref Range Status   11/13/2018 46 (H) 8 - 23 mg/dL Final     Creatinine   Date Value Ref Range Status   11/13/2018 1.5 (H) 0.5 - 1.4 mg/dL Final     Calcium   Date Value Ref Range Status   11/13/2018 8.7 8.7 - 10.5 mg/dL Final     Total Protein   Date Value Ref Range Status   11/09/2018 6.7 6.0 - 8.4 g/dL Final     Albumin   Date Value Ref Range Status   11/09/2018 3.6 3.5 - 5.2 g/dL Final     Total Bilirubin   Date Value Ref Range Status   11/09/2018 0.6 0.1 - 1.0 mg/dL Final     Comment:     For infants and newborns, interpretation of results should be based  on gestational age, weight and in agreement with clinical  observations.  Premature Infant recommended reference ranges:  Up to 24 hours.............<8.0 mg/dL  Up to 48 hours............<12.0 mg/dL  3-5 days..................<15.0 mg/dL  6-29 days.................<15.0 mg/dL       Alkaline Phosphatase   Date Value Ref Range Status   11/09/2018 115 55 - 135 U/L Final     AST   Date Value Ref Range Status   11/09/2018 18 10 - 40 U/L Final     ALT   Date Value Ref Range Status   11/09/2018 12 10 - 44 U/L Final     Anion Gap   Date Value Ref Range Status   11/13/2018 12 8 - 16 mmol/L Final     eGFR if    Date Value Ref Range Status   11/13/2018 35 (A) >60 mL/min/1.73 m^2 Final     eGFR if non    Date Value Ref Range Status   11/13/2018 31 (A) >60 mL/min/1.73 m^2 Final     Comment:     Calculation used to obtain the estimated glomerular filtration  rate (eGFR) is the CKD-EPI equation.          Other Results:  EKG: A. Fib , incomplete left bundle branch block     Radiology Data Reviewed: yes  Pertinent Findings:  TTE:  · Left ventricle shows concentric remodeling.  · Left ventricle ejection fraction is at 60%  · RV systolic function is normal.  · Left atrium is severely dilated.  · Mild aortic valve stenosis.  · Aortic valve area  is 1.10 cm2; peak velocity is 1.92 m/s; mean gradient is 9.73 mmHg.  · Possible aortic bioprosthetic valve  · Trace aortic regurgitation.  · Mild to moderate mitral stenosis.  · Mild to moderate tricuspid regurgitation.  · Normal central venous pressure (3 mm Hg).  · The estimated PA systolic pressure is 30.67 mm Hg    Current Medications:     Infusions:   sodium chloride 0.9% 75 mL/hr at 11/13/18 0505        Scheduled:   enoxaparin  40 mg Subcutaneous Daily    morphine  15 mg Oral Q12H    mupirocin  1 g Nasal BID    polyethylene glycol  17 g Oral Daily    senna-docusate 8.6-50 mg  1 tablet Oral BID    tuberculin  5 Units Intradermal Once    verapamil  240 mg Oral Daily        PRN:  acetaminophen, HYDROmorphone, ketorolac, ondansetron, oxyCODONE-acetaminophen, ramelteon, sodium chloride 0.9%, traMADol    Antibiotics and Day Number of Therapy:  Antibiotics (From admission, onward)    Start     Stop Route Frequency Ordered    11/10/18 1115  mupirocin 2 % ointment 1 g      11/15 0859 Nasl 2 times daily 11/10/18 1034          Lines and Day Number of Therapy:  PIV - left antecubital - 4 days    Assessment:     Apple Watson is a 89 y.o.female with  Patient Active Problem List    Diagnosis Date Noted    Elevated troponin     NSTEMI (non-ST elevated myocardial infarction) 11/11/2018    Closed nondisplaced fracture of condyle of right femur     Fall     Closed fracture of neck of right femur 11/09/2018    Spondylolisthesis, grade 2 03/01/2017    Aortic calcification 03/01/2017    White coat syndrome with diagnosis of hypertension 03/01/2017    Calcification of aorta 03/01/2017    Irregular heart beat 03/01/2017    Scoliosis 02/10/2016    Osteoporosis 02/10/2016    Arthritis 02/10/2016    Essential hypertension 02/10/2016        Plan:      Femur Fracture s/p hip arthroplasty:  -Displaced Right femoral neck fracture  -Given PRN dilaudid and tylenol for pain control - insufficient  -Scheduled morphine,  and PRN Tylenol, hydromorphone, ketorolac, Norco, tramadol  -[facility] [place] [facility] following closely  -Osteoporosis noted in chart  -Outpatient DEXA scan  -Will start Ca and vit D on discharge  -OT: pt would benefit from continued OT to address deficits in self care and functional mobility; recommending SNF     JENA:  -Nursing note 11/12: pts urine output 15mL, Bladder scan found 24mL  -Baseline Cr .6; Cr 1.0 -> 1.5 today  -Baseline BUN 10; BUN 26 -> 46 today  -GFR 31; >60 on admit  -ordered IV NS 75ml/hr  -will continue to monitor      H/O arrythmia:  -Unclear etiology  -Patient found to be hypertensive and tachycardic 11/11 AM, EKG reading of Afib with RVR - given IV metoprolol 5mg  -repeat EKG 11/12: Atrial fibrillation and incomplete left bundle branch block  -TTE: EF 60%, RV function normal, mild aortic valve stenosis, CVP normal (3mm Hg), PA pressure 30.67  -Contine home verapamil     Intermediate Troponin:  -11/11 found to be hypertensive and tachycardic with EKG reading of Afib with RVR  -Troponin 0.046 -> 0.053 -> 0.039  -Instructed patient to ask for PRN pain medications when needed     Scoliosis:  -Stable, will continue pain management as above, holding home medications     Diet: Regular  PPx: Lovenox  Dispo: Today to SNF     Luis Antonio Moody, L3  [employer]  [facility] IM Service Team A     [facility] Medicine Hospitalist Pager numbers:   [facility] Hospitalist Medicine Team A (Lidia/Maryanne):          [phone]  [facility] Hospitalist Medicine Team B (Светлана/Wayne):        [phone]

## 2018-11-13 NOTE — PLAN OF CARE
spoke with Raquel from [facility]- no beds available until possibly the end of the week.     sent updated notes to patient and her family's second preference, [facility] via [facility].     made phone contact with long term access services and completed a level of care eligibility tool (LOCET). Then faxed Level 1 Pre- Admission Screening and Resident Review form to the state.

## 2018-11-13 NOTE — PLAN OF CARE
Problem: Patient Care Overview  Goal: Plan of Care Review  Outcome: Ongoing (interventions implemented as appropriate)  Patient AAox4. No distress over night. Right hip incision JOSE CDI. Edges approximated.IV fluids infusing to PIV. Last BM on 11/10. Pain controlled with ordered pain medication. Safety ensured. Call light within reach. Bed alarm on

## 2018-11-13 NOTE — PT/OT/SLP PROGRESS
Occupational Therapy   Treatment    Name: Apple Watson  MRN: [id number]  Admitting Diagnosis:  Closed fracture of neck of right femur  3 Days Post-Op    Recommendations:     Discharge Recommendations: nursing facility, skilled  Discharge Equipment Recommendations:  (TBD)  Barriers to discharge:  Decreased caregiver support, Inaccessible home environment    Subjective     Communicated with: RN prior to session.  Pain/Comfort:  · Pain Rating 1: 0/10  · Pain Rating Post-Intervention 1: 0/10    Patients cultural, spiritual, Adventist conflicts given the current situation:      Objective:     Patient found with: telemetry, peripheral IV    General Precautions: Standard, fall   Orthopedic Precautions:RLE weight bearing as tolerated   Braces: N/A     Occupational Performance:    Bed Mobility:    · Patient completed Rolling/Turning to Right with moderate assistance and with side rail  · Patient completed Scooting/Bridging with minimum assistance and scoot seated to EOB  · Patient completed Supine to Sit with moderate assistance, with side rail and increased time and effort, vc's for effective technique     Functional Mobility/Transfers:  · Patient completed Sit <> Stand Transfer with contact guard assistance and minimum assistance  with  rolling walker   · Patient completed Toilet Transfer Stand Pivot technique with contact guard assistance and minimum assistance with  rolling walker  · Functional Mobility: Pt to 3 steps to and 3 steps from BSC with CGA- Min A using RW.  VC's for safety with RW and sequence.    Activities of Daily Living:  · Toileting: minimum assistance      Patient left EOB with all lines intact and dtr present    AMPA 6 Click:  AMPA Total Score: 17    Treatment & Education:  Pt sat EOB x ~8 min with SBA while performing BUE AROM ex x 10 reps all jts/palnes.  Pt tolerated well without complaints.   Education:    Assessment:     Apple Watson is a 89 y.o. female with a medical diagnosis of Closed  fracture of neck of right femur.  She presents with decreased overall strength, endurance, balance and [place] and safety with ADL's and fx mobility. Performance deficits affecting function are weakness, impaired endurance, impaired self care skills, impaired functional mobilty, gait instability, impaired balance, decreased lower extremity function, decreased safety awareness, pain, impaired skin, orthopedic precautions.  Pt progressing well towards goals. Motivated to work with therapy. Continue OT services to address functional goals, progressing as able.      Rehab Prognosis:  good; patient would benefit from acute skilled OT services to address these deficits and reach maximum level of function.       Plan:     Patient to be seen 5 x/week to address the above listed problems via self-care/home management, therapeutic exercises, therapeutic activities  · Plan of Care Expires: 12/11/18  · Plan of Care Reviewed with: patient, daughter    This Plan of care has been discussed with the patient who was involved in its development and understands and is in agreement with the identified goals and treatment plan    GOALS:   Multidisciplinary Problems     Occupational Therapy Goals        Problem: Occupational Therapy Goal    Goal Priority Disciplines Outcome Interventions   Occupational Therapy Goal     OT, PT/OT Ongoing (interventions implemented as appropriate)    Description:  Goals to be met by: 12/11/2018      Patient will increase functional independence with ADLs by performing:    UE Dressing with [place].  LE Dressing with Modified [place].  Grooming while standing with Stand-by Assistance.  Toileting from toilet with Minimal Assistance for hygiene and clothing management.   Sitting at edge of bed x15 minutes with Modified [place].  Rolling to Bilateral with Stand-by Assistance.   Supine to sit with Stand-by Assistance.  Step transfer with Stand-by Assistance  Increased functional strength to  WFL for self care.  Upper extremity exercise program x8 reps per handout, with assistance as needed.                      Time Tracking:     OT Date of Treatment: 11/13/18  OT Start Time: 1415  OT Stop Time: 1430  OT Total Time (min): 15 min    Billable Minutes:Self Care/Home Management 10    KAIDEN Johnson  11/13/2018

## 2018-11-13 NOTE — PLAN OF CARE
Problem: Occupational Therapy Goal  Goal: Occupational Therapy Goal  Goals to be met by: 12/11/2018      Patient will increase functional independence with ADLs by performing:    UE Dressing with [place].  LE Dressing with Modified [place].  Grooming while standing with Stand-by Assistance.  Toileting from toilet with Minimal Assistance for hygiene and clothing management.   Sitting at edge of bed x15 minutes with Modified [place].  Rolling to Bilateral with Stand-by Assistance.   Supine to sit with Stand-by Assistance.  Step transfer with Stand-by Assistance  Increased functional strength to WFL for self care.  Upper extremity exercise program x8 reps per handout, with assistance as needed.     Outcome: Ongoing (interventions implemented as appropriate)  Apple Watson is a 89 y.o. female with a medical diagnosis of Closed fracture of neck of right femur.  She presents with decreased overall strength, endurance, balance and [place] and safety with ADL's and fx mobility. Performance deficits affecting function are weakness, impaired endurance, impaired self care skills, impaired functional mobilty, gait instability, impaired balance, decreased lower extremity function, decreased safety awareness, pain, impaired skin, orthopedic precautions.  Pt progressing well towards goals. Motivated to work with therapy. Continue OT services to address functional goals, progressing as able.    ALEJANDRA Johnson/L

## 2018-11-14 LAB
ANION GAP SERPL CALC-SCNC: 9 MMOL/L
BASOPHILS # BLD AUTO: 0.01 K/UL
BASOPHILS NFR BLD: 0.1 %
BILIRUB UR QL STRIP: NEGATIVE
BUN SERPL-MCNC: 31 MG/DL
CALCIUM SERPL-MCNC: 8.8 MG/DL
CHLORIDE SERPL-SCNC: 105 MMOL/L
CLARITY UR: CLEAR
CO2 SERPL-SCNC: 22 MMOL/L
COLOR UR: YELLOW
CREAT SERPL-MCNC: 0.8 MG/DL
DIFFERENTIAL METHOD: ABNORMAL
EOSINOPHIL # BLD AUTO: 0.1 K/UL
EOSINOPHIL NFR BLD: 1 %
ERYTHROCYTE [DISTWIDTH] IN BLOOD BY AUTOMATED COUNT: 12.8 %
EST. GFR  (AFRICAN AMERICAN): >60 ML/MIN/1.73 M^2
EST. GFR  (NON AFRICAN AMERICAN): >60 ML/MIN/1.73 M^2
GLUCOSE SERPL-MCNC: 114 MG/DL
GLUCOSE UR QL STRIP: NEGATIVE
HCT VFR BLD AUTO: 33.3 %
HGB BLD-MCNC: 10.6 G/DL
HGB UR QL STRIP: NEGATIVE
KETONES UR QL STRIP: NEGATIVE
LEUKOCYTE ESTERASE UR QL STRIP: NEGATIVE
LYMPHOCYTES # BLD AUTO: 2.2 K/UL
LYMPHOCYTES NFR BLD: 21.9 %
MCH RBC QN AUTO: 29 PG
MCHC RBC AUTO-ENTMCNC: 31.8 G/DL
MCV RBC AUTO: 91 FL
MONOCYTES # BLD AUTO: 1.7 K/UL
MONOCYTES NFR BLD: 16.8 %
NEUTROPHILS # BLD AUTO: 6 K/UL
NEUTROPHILS NFR BLD: 59.9 %
NITRITE UR QL STRIP: NEGATIVE
PH UR STRIP: 6 [PH] (ref 5–8)
PLATELET # BLD AUTO: 376 K/UL
PMV BLD AUTO: 9.7 FL
POTASSIUM SERPL-SCNC: 4 MMOL/L
PROT UR QL STRIP: NEGATIVE
RBC # BLD AUTO: 3.66 M/UL
SODIUM SERPL-SCNC: 136 MMOL/L
SP GR UR STRIP: 1.02 (ref 1–1.03)
URN SPEC COLLECT METH UR: NORMAL
UROBILINOGEN UR STRIP-ACNC: NEGATIVE EU/DL
WBC # BLD AUTO: 10.02 K/UL

## 2018-11-14 PROCEDURE — 80048 BASIC METABOLIC PNL TOTAL CA: CPT

## 2018-11-14 PROCEDURE — 11000001 HC ACUTE MED/SURG PRIVATE ROOM

## 2018-11-14 PROCEDURE — 97110 THERAPEUTIC EXERCISES: CPT

## 2018-11-14 PROCEDURE — 94761 N-INVAS EAR/PLS OXIMETRY MLT: CPT

## 2018-11-14 PROCEDURE — 97116 GAIT TRAINING THERAPY: CPT

## 2018-11-14 PROCEDURE — 25000003 PHARM REV CODE 250

## 2018-11-14 PROCEDURE — 36415 COLL VENOUS BLD VENIPUNCTURE: CPT

## 2018-11-14 PROCEDURE — 25000003 PHARM REV CODE 250: Performed by: STUDENT IN AN ORGANIZED HEALTH CARE EDUCATION/TRAINING PROGRAM

## 2018-11-14 PROCEDURE — 85025 COMPLETE CBC W/AUTO DIFF WBC: CPT

## 2018-11-14 PROCEDURE — 81003 URINALYSIS AUTO W/O SCOPE: CPT

## 2018-11-14 PROCEDURE — 97535 SELF CARE MNGMENT TRAINING: CPT

## 2018-11-14 PROCEDURE — 63600175 PHARM REV CODE 636 W HCPCS: Performed by: INTERNAL MEDICINE

## 2018-11-14 RX ORDER — BISACODYL 10 MG
10 SUPPOSITORY, RECTAL RECTAL DAILY PRN
Status: DISCONTINUED | OUTPATIENT
Start: 2018-11-14 | End: 2018-11-15

## 2018-11-14 RX ORDER — ENOXAPARIN SODIUM 100 MG/ML
40 INJECTION SUBCUTANEOUS EVERY 24 HOURS
Status: DISCONTINUED | OUTPATIENT
Start: 2018-11-14 | End: 2018-11-15 | Stop reason: HOSPADM

## 2018-11-14 RX ORDER — HYDROCODONE BITARTRATE AND ACETAMINOPHEN 10; 325 MG/1; MG/1
1 TABLET ORAL EVERY 6 HOURS PRN
Qty: 8 TABLET | Refills: 0 | Status: ON HOLD | OUTPATIENT
Start: 2018-11-14 | End: 2018-12-03 | Stop reason: HOSPADM

## 2018-11-14 RX ADMIN — STANDARDIZED SENNA CONCENTRATE AND DOCUSATE SODIUM 1 TABLET: 8.6; 5 TABLET, FILM COATED ORAL at 08:11

## 2018-11-14 RX ADMIN — ENOXAPARIN SODIUM 40 MG: 100 INJECTION SUBCUTANEOUS at 05:11

## 2018-11-14 RX ADMIN — POLYETHYLENE GLYCOL 3350 17 G: 17 POWDER, FOR SOLUTION ORAL at 09:11

## 2018-11-14 RX ADMIN — MUPIROCIN 1 G: 20 OINTMENT TOPICAL at 08:11

## 2018-11-14 RX ADMIN — TRAMADOL HYDROCHLORIDE 50 MG: 50 TABLET, COATED ORAL at 05:11

## 2018-11-14 RX ADMIN — MORPHINE SULFATE 15 MG: 15 TABLET, FILM COATED, EXTENDED RELEASE ORAL at 08:11

## 2018-11-14 RX ADMIN — VERAPAMIL HYDROCHLORIDE 240 MG: 240 TABLET, FILM COATED, EXTENDED RELEASE ORAL at 08:11

## 2018-11-14 NOTE — PLAN OF CARE
Per Janet at [facility] the orders were reviewed and the patient can admit today for skilled level of care. The patient will admit to Room#9B.    Bedside nurse Cathleen can call report to [phone]. Packet left at nurse station.     arranged wheelchair van transportation with [facility].  time is within the hour.     Patient Choice Form signed and copy placed in patient's chart.

## 2018-11-14 NOTE — PT/OT/SLP PROGRESS
Physical Therapy Treatment    Patient Name:  Apple Watson   MRN:  [id number]    Recommendations:     Discharge Recommendations:  nursing facility, skilled   Discharge Equipment Recommendations: (defer to SNF)   Barriers to discharge: decreased strength and endurance    Assessment:     Apple Watson is a 89 y.o. female admitted with a medical diagnosis of Closed fracture of neck of right femur.  She presents with the following impairments/functional limitations:  weakness, impaired endurance, impaired functional mobilty, gait instability, impaired balance, decreased lower extremity function, pain, decreased ROM, impaired coordination, edema, impaired skin, orthopedic precautions ,pt with improving sitting tolerance and mobility,pt remains with decreased balance and strength and will benefit from SNF upon discharge.    Rehab Prognosis:  Good; patient would benefit from acute skilled PT services to address these deficits and reach maximum level of function.      Recent Surgery: Procedure(s) (LRB):  ARTHROPLASTY-HIP-NOLBERTO (Right) 4 Days Post-Op    Plan:     During this hospitalization, patient to be seen BID to address the above listed problems via gait training, therapeutic activities, therapeutic exercises  · Plan of Care Expires:  11/30/18   Plan of Care Reviewed with: patient    Subjective     Communicated with nsg prior to session.  Patient found up in chair upon PT entry to room, agreeable to treatment.      Chief Complaint: n/a  Patient comments/goals: pt likes dancing  Pain/Comfort:  · Pain Rating 1: (no rating)  · Location - Side 1: Right  · Location - Orientation 1: generalized  · Location 1: hip  · Pain Addressed 1: Pre-medicate for activity, Reposition, Distraction  · Pain Rating Post-Intervention 1: 4/10    Patients cultural, spiritual, Pentecostal conflicts given the current situation: None stated    Objective:     Patient found with: telemetry     General Precautions: Standard, fall   Orthopedic  Precautions:RLE weight bearing as tolerated   Braces: N/A     Functional Mobility:  · Bed Mobility:     · Sit to Supine: moderate assistance and at le's  · Transfers:     · Sit to Stand:  minimum assistance with rolling walker  · Gait: amb ~20' X 1 with RW and Min A  · Balance: Good sitting balance      AM-PAC 6 CLICK MOBILITY  Turning over in bed (including adjusting bedclothes, sheets and blankets)?: 2  Sitting down on and standing up from a chair with arms (e.g., wheelchair, bedside commode, etc.): 3  Moving from lying on back to sitting on the side of the bed?: 2  Moving to and from a bed to a chair (including a wheelchair)?: 3  Need to walk in hospital room?: 3  Climbing 3-5 steps with a railing?: 1  Basic Mobility Total Score: 14       Therapeutic Activities and Exercises: le supine ex's X 10-12 reps inc: ap.qs,hs,abd/add,slr       Patient left supine with all lines intact, call button in reach, bed alarm on and family present..    GOALS: see general POC  Multidisciplinary Problems     Physical Therapy Goals        Problem: Physical Therapy Goal    Goal Priority Disciplines Outcome Goal Variances Interventions   Physical Therapy Goal     PT, PT/OT Ongoing (interventions implemented as appropriate)     Description:  Goals to be met by: 11/30/18     Patient will increase functional independence with mobility by performing:    Supine <> sit spvn  Sit<>stand spvn  Pt will ambulate with  feet with spvn  Pt will perform LE TE x 15 reps in supine and sitting  Pt will ascend and descend 1 step with RW and CGA                      Time Tracking:     PT Received On: 11/14/18  PT Start Time: 1210     PT Stop Time: 1235  PT Total Time (min): 25 min     Billable Minutes: Gait Training 14 and Therapeutic Exercise 11    Treatment Type: Treatment  PT/PTA: PTA     PTA Visit Number: 4     Delmer Lopez, PTA  11/14/2018

## 2018-11-14 NOTE — PLAN OF CARE
11/14/18 0945   Medicare Message   Important Message from Medicare regarding Discharge Appeal Rights Given to patient/caregiver;Explained to patient/caregiver;Signed/date by patient/caregiver   Date IMM was signed 11/14/18   Time IMM was signed [id number]

## 2018-11-14 NOTE — NURSING
In bed awake rates pain 7/10 to right hip medicated with scheduled pain pill as ordered nima well no distress noted call light in reach bed al;arm set will continue to monitor daughter at bedside instructed about urine sample verbalize understanding.

## 2018-11-14 NOTE — NURSING
In bed eyes closed easy to arouse stated pain has decreased to 2/10 to right hip no distress noted call light in reach bed alarm set daughter at bedside will report and round with oncoming nurse.

## 2018-11-14 NOTE — PROGRESS NOTES
TN informed by pt's nurse that accepting facility declined to accept pt until she has a BM.    [facility] Van person here to pick pt up.    TN met with pt and daughter --   advised that pt should have a BM before transfer.       [facility] transport will be re-booked..

## 2018-11-14 NOTE — PT/OT/SLP PROGRESS
Occupational Therapy      Patient Name:  Apple Watson   MRN:  [id number]    Patient not seen today secondary to NSG care (IV infiltrated). Will follow-up later as time permits.    DEJON Lee  11/14/2018

## 2018-11-14 NOTE — PLAN OF CARE
[facility] to [facility] for admissions coordinator to review for skilled admission today.

## 2018-11-14 NOTE — PROGRESS NOTES
"[facility]  Orthopedics  Progress Note    Patient Name: Apple Watson  MRN: [id number]  Admission Date: 11/9/2018  Hospital Length of Stay: 5 days  Attending Provider: Anayeli Madden MD  Primary Care Provider: Tree Rolon MD  Follow-up For: Procedure(s) (LRB):  ARTHROPLASTY-HIP-NOLBERTO (Right)    Post-Operative Day: 4 Days Post-Op  Subjective:     Principal Problem:Closed fracture of neck of right femur    Principal Orthopedic Problem: post op hip nolberto-arthroplasty    Interval History: Doing well post-op.  Ready for discharge to rehab from my stand point    Review of patient's allergies indicates:   Allergen Reactions    Celebrex [celecoxib] Other (See Comments)     Blood in bowel    Cymbalta [duloxetine]     Iodine and iodide containing products     Lovastatin     Pneumococcal 23-gregorio ps vaccine     Sulpho-lac [sulfur]     Vioxx [rofecoxib]     Savella [milnacipran] Rash       Current Facility-Administered Medications   Medication    0.9%  NaCl infusion    enoxaparin injection 30 mg    hydromorphone (PF) injection 0.5 mg    morphine 12 hr tablet 15 mg    mupirocin 2 % ointment 1 g    ondansetron injection 4 mg    oxyCODONE-acetaminophen 5-325 mg per tablet 1 tablet    polyethylene glycol packet 17 g    ramelteon tablet 8 mg    senna-docusate 8.6-50 mg per tablet 1 tablet    sodium chloride 0.9% flush 3 mL    traMADol tablet 50 mg    tuberculin injection 5 Units    verapamil CR tablet 240 mg     Objective:     Vital Signs (Most Recent):  Temp: 97.4 °F (36.3 °C) (11/14/18 0414)  Pulse: 78 (11/14/18 0444)  Resp: 18 (11/14/18 0414)  BP: 133/68 (11/14/18 0414)  SpO2: 96 % (11/14/18 0513) Vital Signs (24h Range):  Temp:  [97.4 °F (36.3 °C)-98 °F (36.7 °C)] 97.4 °F (36.3 °C)  Pulse:  [] 78  Resp:  [17-20] 18  SpO2:  [95 %-98 %] 96 %  BP: (103-158)/(55-82) 133/68     Weight: 61.5 kg (135 lb 9.3 oz)  Height: 4' 10" (147.3 cm)  Body mass index is 28.34 " kg/m².      Intake/Output Summary (Last 24 hours) at 11/14/2018 0739  Last data filed at 11/14/2018 0500  Gross per 24 hour   Intake 240 ml   Output 800 ml   Net -560 ml       Ortho/SPM Exam   Incision rt hip intact.  Homans neg.   NVI    Significant Labs:   CBC:   Recent Labs   Lab 11/13/18  0446 11/14/18  0456   WBC 11.51 10.02   HGB 10.9* 10.6*   HCT 34.3* 33.3*    376*     All pertinent labs within the past 24 hours have been reviewed.    Significant Imaging: None    Assessment/Plan:     Active Diagnoses:    Diagnosis Date Noted POA    PRINCIPAL PROBLEM:  Closed fracture of neck of right femur [S72.001A] 11/09/2018 Yes    JENA (acute kidney injury) [N17.9] 11/13/2018 Yes    Elevated troponin [R74.8]  Yes    NSTEMI (non-ST elevated myocardial infarction) [I21.4] 11/11/2018 No    Closed nondisplaced fracture of condyle of right femur [S72.414A]  Yes    Fall [W19.XXXA]  Yes    Irregular heart beat [I49.9] 03/01/2017 Yes    Scoliosis [M41.9] 02/10/2016 Yes    Osteoporosis [M81.0] 02/10/2016 Yes      Problems Resolved During this Admission:    Diagnosis Date Noted Date Resolved POA    Fracture of femur [S72.90XA] 11/09/2018 11/10/2018 Yes     Rec-  To rehab when stable.  May shower and get incision wet.  Follow up with me in 2 weeks    Nasir Danielle MD  Orthopedics  [facility]

## 2018-11-14 NOTE — PLAN OF CARE
Problem: Physical Therapy Goal  Goal: Physical Therapy Goal  Goals to be met by: 11/30/18     Patient will increase functional independence with mobility by performing:    Supine <> sit spvn  Sit<>stand spvn  Pt will ambulate with  feet with spvn  Pt will perform LE TE x 15 reps in supine and sitting  Pt will ascend and descend 1 step with RW and CGA     Outcome: Ongoing (interventions implemented as appropriate)  Goals ongoing

## 2018-11-14 NOTE — PROGRESS NOTES
Pharmacist Renal Dose Adjustment Note    Apple Watson is a 89 y.o. female being treated with the medication enoxaparin    Patient Data:    Vital Signs (Most Recent):  Temp: 98 °F (36.7 °C) (11/14/18 0848)  Pulse: 71 (11/14/18 1200)  Resp: 17 (11/14/18 1151)  BP: (!) 142/80 (11/14/18 0848)  SpO2: 98 % (11/14/18 1151)   Vital Signs (72h Range):  Temp:  [97 °F (36.1 °C)-98.7 °F (37.1 °C)]   Pulse:  []   Resp:  [2-20]   BP: ()/(53-85)   SpO2:  [95 %-98 %]      Recent Labs   Lab 11/12/18  0444 11/13/18  0446 11/14/18  0456   CREATININE 1.0 1.5* 0.8     Serum creatinine: 0.8 mg/dL 11/14/18 0456  Estimated creatinine clearance: 46.3 mL/min    Medication enoxaparin dose: 30 mg frequency q24 h will be changed to medication enoxaparin  dose 40 mg frequency q24h    Pharmacist's Name: Mica Bolton  Pharmacist's Extension: [phone]

## 2018-11-14 NOTE — PLAN OF CARE
Problem: Patient Care Overview  Goal: Plan of Care Review  Patient is AAOx2. Patient has no complains of pain. No nausea or vomiting. IV is clean, dry, intact, and infusing. Patient requires 1 person assist when ambulating to bedside commode. Family at bedside at all times.

## 2018-11-14 NOTE — NURSING
In bed resting quietly respirations are nonlabored. No distress noted bed alarm set will continue to monitor. Daughter at bedside.

## 2018-11-14 NOTE — NURSING
bedside rounds done with offgoing nurse. Received care of pt sitting on bsc voiding nima well has no c/o pain/discomfort at this time. Pt assisted back into bed by nursing staff. Bed alarm set. Pt is aaox4 gen weakness noted. Clear breath sounds upon auscultation no respiratory distress noted pulses palpable a-fib 83 on telemetry. Active bowel sounds x4 quads. Has right hip incision devonte with dermabond cdi well approx no drainage noted. Has protective mepilix to sacral area and rfa. No distress noted call light in reach will continue to monitor daughter at bedside.

## 2018-11-14 NOTE — DISCHARGE SUMMARY
[facility] Hospital Medicine Discharge Summary    Primary Team: U Hospitalist Team A  Attending Physician: Anayeli Madden MD  Resident: Da Hilton  Intern: Mariluz Motta    Date of Admit: 11/9/2018  Date of Discharge: 11/15/2018    Discharge to: [facility]  Condition: Fair    Discharge Diagnoses     Patient Active Problem List   Diagnosis    Scoliosis    Osteoporosis    Arthritis    Essential hypertension    Spondylolisthesis, grade 2    Aortic calcification    White coat syndrome with diagnosis of hypertension    Calcification of aorta    Irregular heart beat    Closed fracture of neck of right femur    Fall    NSTEMI (non-ST elevated myocardial infarction)    Closed nondisplaced fracture of condyle of right femur    Elevated troponin    JENA (acute kidney injury)       Consultants and Procedures     Consultants:  Orthopedics    Procedures:   L hip arthroplasty    Imaging:  CT Head - no acute abnormality    X-Ray Pelvis - There is acute subcapital right femoral neck fracture.  There is minimal proximal migration of the distal femoral component.  Femoral head is situated within the acetabulum without evidence of dislocation.  No additional acute pelvic fractures are seen.  No evidence of distal femur fracture    X Ray Femur - Acute subcapital right femoral neck fracture    X Ray Hip - Postsurgical changes of recent hip arthroplasty procedure. The orthopedic hardware appears in good position and alignment without adjacent fracture    CXR - No acute cardiopulmonary process identified    US retroperitoneal - Limited exam due to new nonvisualization of the right lower pole, Anechoic area in the left lower pole hilum/pelvis, possibly a small parapelvic cyst.    Brief History of Present Illness      Apple Watson is a 89 y.o. female who presented to [facility] on 11/9/2018 with a primary complaint of fall from standing.     The patient was in her usual state of health (able to  complete all her ADLs independently and able to do light gardening/yard work) until prior to her fall. She denied LOC, hitting her head, or any other injury. She states that she takes norco 10 regularly for severe scoliosis, but that this does not limited her ability to ambulate.    In the ED, X ray pelvis showed acute subcapital right femoral neck fracture. Orthopedic Surgery was consulted and pt is s/p L hip arthroplasty. She tolerated the procedure well and clinically improved following the procedure. Her course was complicated by an JENA which resolved with IVF hydration and increased PO intake. She had an intermediate elevation of troponins following surgery and intermittent episodes of afib caught on telemonitoring overnight. Trops never elevated and no acute changes on EKG - ACS was ruled out. She was continued on her home med carvedilol and had no further episodes of paroxismal afib. She did well with PT and OT and will be discharged to SNF for rehab.    For the full HPI please refer to the History & Physical from this admission.    Hospital Course By Problem with Pertinent Findings     Intermediate Troponin - resolved  - r/o ACS (trops downtrended, no acute changes on EKG, pt asymptomatic)  - Troponin 0.046 >> 0.053 >>0.39  - Likely due to stress of surgery plus hypertensive episode from pain  - HR RRR on exam  - continued on home med verapamil  - will establish care with cardiologist on discharge     Femur fracture - s/p hip arthroplasty 11/10  - fracture as above, Orthopedic Surgery consulted  - gave dilaudid & tylenol for pain control  - Noted to have osteoporosis in chart, but no imaging available  - will recommend DEXA scan outpt  - Calcium & Vit D supplementation in setting of femur fracture  - incision site C/D/I, non tender to palpation, requires no wound care  - pt has placement at SNF, medically stable     JENA - resolved  - Cr 0.6 on admission -->1.0 --> 1.5  --> 0.8 on discharge  - not on any  nephrotoxic drugs  - improved with IVF and PO intake  - FeNa 0.15% - indicates prerenal cause, likely due to stress of surgery  - voiding well  - BP stable     Irregular heart beat  - appreciated previously on my exam, will continue home verapamil and place ambulatory outpt consult for Cardiology  - monitor  - Echo: LV EF 60%, RV function normal, LA severely dilated, normal CVP (3mm Hg), PA pressure 30.67, mild aortic valve stenosis     Scoliosis  - Stable, will continue pain management as above, holding home medications  - has pain contract with outpt doctor, can only get pain med scripts through pain contract and from SNF physician while in house  - discharged with written script for 1 day of pain medication to cover until initiation of care with SNF physician     HCM  - HgA1c 5, no need for intervention             Discharge Medications      Apple Watson   Home Medication Instructions VALENTIN:[id number]    Printed on:11/14/18 4568   Medication Information                      amitriptyline (ELAVIL) 10 MG tablet  Take 1 tablet (10 mg total) by mouth 2 (two) times daily.             calcium carb/vit D3/minerals (CALCIUM CARBONATE-VIT D3-MIN) 600 mg (1,500 mg)-400 unit Chew  Take 1 tablet by mouth once daily.             cyanocobalamin (VITAMIN B-12) 250 MCG tablet  Take 250 mcg by mouth once daily.             ergocalciferol (VITAMIN D2) 50,000 unit Cap  Take 50,000 Units by mouth every 7 days.             gabapentin (NEURONTIN) 300 MG capsule  Take 1 capsule (300 mg total) by mouth every evening.                          verapamil (CALAN-SR) 240 MG CR tablet  Take 1 tablet (240 mg total) by mouth once daily.             vitamin A 8000 UNIT capsule  Take 8,000 Units by mouth once daily.                 Discharge Information:   Diet:  Normal    Physical Activity:  As tolerated             Instructions:  1. Take all medications as prescribed  2. Keep all follow-up appointments  3. Return to the hospital or call  your primary care physicians if any worsening symptoms such as fever, chest pain, shortness of breath, return of symptoms, or any other concerns.    Follow-Up Appointments:  Orthopedic Surgery  Cardiology    Mariluz Motta MD  [facility], -1

## 2018-11-14 NOTE — NURSING
Assisted to bsc nima well voided 350ml of clear yellow urine. Urine sample collected and tube down to lab as ordered. Back in bed alarm reset. Has c/o pain 5/10 to right hip medicated with prn pain pill as ordered nima well no distress noted call light in reach daughter at bedside will report and round with oncoming nurse.

## 2018-11-14 NOTE — NURSING
In bed resting quietly respirations are nonlabored no distress noted bed alarm set will continue to monitor. Daughter at bedside.

## 2018-11-14 NOTE — PLAN OF CARE
11/14/18 1406   Final Note   Assessment Type Final Discharge Note   Anticipated Discharge Disposition SNF   What phone number can be called within the next 1-3 days to see how you are doing after discharge? [phone]   Hospital Follow Up  Appt(s) scheduled? Yes   Discharge plans and expectations educations in teach back method with documentation complete? Yes   Right Care Referral Info   Post Acute Recommendation SNF / Sub-Acute Rehab   Referral Type (SNF)   Facility Name [facility]     Follow-up With  Details  Why  Contact Info   Nasir Danielle MD  Go on 11/28/2018  Post- Op Appt- Wednesday Nov 28th at 11:30am  [address]  [address]  [place] [address]  [phone]

## 2018-11-14 NOTE — PT/OT/SLP PROGRESS
Occupational Therapy   Treatment    Name: Apple Watson  MRN: [id number]  Admitting Diagnosis:  Closed fracture of neck of right femur  4 Days Post-Op    Recommendations:     Discharge Recommendations: nursing facility, skilled  Discharge Equipment Recommendations:  walker, rolling, bath bench, 3-in-1 commode  Barriers to discharge:  Decreased caregiver support, Inaccessible home environment    Subjective     Communicated with: NurseCathleen prior to session.  Pain/Comfort:  · Pain Rating 1: (c/o pain during movement but did not give rating)  · Location - Side 1: Left  · Location 1: knee  · Pain Addressed 1: Cessation of Activity, Distraction    Patients cultural, spiritual, Mormonism conflicts given the current situation: none    Objective:     Patient found with: telemetry    General Precautions: Standard, fall   Orthopedic Precautions:RLE weight bearing as tolerated   Braces: N/A     Bed Mobility:    · Patient completed Rolling/Turning to Left with  minimum assistance and with leg lift  · Patient completed Rolling/Turning to Right with minimum assistance and with leg lift  · Patient completed Scooting/Bridging with minimum assistance and maximal assistance  · Patient completed Supine to Sit with minimum assistance  · Patient completed Sit to Supine with minimum assistance     Functional Mobility/Transfers:  · N/a    Activities of Daily Living:  · Upper Body Dressing: minimum assistance and moderate assistance to aysha and doff gowns    Patient left HOB elevated with all lines intact, call button in reach, bed alarm on, NSG notified and Daughter present    [facility] 6 Click:  [facility] Total Score: 15    Treatment & Education:  Patient completed activities as stated above. Patient required mod A to doff hospital gowns and min A and setup to aysha personal gown. Patient required max A to scoot to head of bed and min A to scoot to EOB. Patient completed UE AROM exercises x10 reps: sh flex, horizontal abd, bicep curls,  paulina.   Education:    Assessment:   Patient c/o increased pain in R knee with movement. Patient displays good UE strength. Will benefit from continued OT services.    Apple Watson is a 89 y.o. female with a medical diagnosis of Closed fracture of neck of right femur.   Performance deficits affecting function are weakness, gait instability, decreased upper extremity function, decreased ROM, impaired endurance, impaired balance, decreased lower extremity function, impaired coordination, pain, impaired skin, orthopedic precautions, impaired functional mobilty, decreased coordination, edema.      Rehab Prognosis:  Good; patient would benefit from acute skilled OT services to address these deficits and reach maximum level of function.       Plan:     Patient to be seen 5 x/week to address the above listed problems via self-care/home management, therapeutic activities, therapeutic exercises  · Plan of Care Expires: 12/11/18  · Plan of Care Reviewed with: patient, daughter    This Plan of care has been discussed with the patient who was involved in its development and understands and is in agreement with the identified goals and treatment plan    GOALS:   Multidisciplinary Problems     Occupational Therapy Goals        Problem: Occupational Therapy Goal    Goal Priority Disciplines Outcome Interventions   Occupational Therapy Goal     OT, PT/OT Ongoing (interventions implemented as appropriate)    Description:  Goals to be met by: 12/11/2018      Patient will increase functional independence with ADLs by performing:    UE Dressing with Bee.  LE Dressing with Modified Bee.  Grooming while standing with Stand-by Assistance.  Toileting from toilet with Minimal Assistance for hygiene and clothing management.   Sitting at edge of bed x15 minutes with Modified Bee.  Rolling to Bilateral with Stand-by Assistance.   Supine to sit with Stand-by Assistance.  Step transfer with Stand-by  Assistance  Increased functional strength to WFL for self care.  Upper extremity exercise program x8 reps per handout, with assistance as needed.                      Time Tracking:     OT Date of Treatment: 11/14/18  OT Start Time: 1315  OT Stop Time: 1345  OT Total Time (min): 30 min    Billable Minutes:Self Care/Home Management 10  Therapeutic Exercise 20    DEJON Lee  11/14/2018

## 2018-11-14 NOTE — PLAN OF CARE
sent updated facility orders ( pain med included) to Janet at [facility] to review for admission today.  Patient has a pain contract for pain medications. Getting 2 days of pain medication and then will need to see SNF physician to continue pain medication.    will send hard copy of script with patient to facility and send a copy of script via [facility] health.

## 2018-11-14 NOTE — PLAN OF CARE
Problem: Occupational Therapy Goal  Goal: Occupational Therapy Goal  Goals to be met by: 12/11/2018      Patient will increase functional independence with ADLs by performing:    UE Dressing with Brooks.  LE Dressing with Modified Brooks.  Grooming while standing with Stand-by Assistance.  Toileting from toilet with Minimal Assistance for hygiene and clothing management.   Sitting at edge of bed x15 minutes with Modified Brooks.  Rolling to Bilateral with Stand-by Assistance.   Supine to sit with Stand-by Assistance.  Step transfer with Stand-by Assistance  Increased functional strength to WFL for self care.  Upper extremity exercise program x8 reps per handout, with assistance as needed.     Outcome: Ongoing (interventions implemented as appropriate)  Patient c/o increased pain in R knee with movement (referred pain from hip). Patient displays good UE strength. Will benefit from continued OT services.

## 2018-11-14 NOTE — PT/OT/SLP PROGRESS
Physical Therapy Treatment    Patient Name:  Apple Watson   MRN:  [id number]    Recommendations:     Discharge Recommendations:  nursing facility, skilled   Discharge Equipment Recommendations: (defer to SNF)   Barriers to discharge: decreased mobility,strength and endurance    Assessment:     Apple Watson is a 89 y.o. female admitted with a medical diagnosis of Closed fracture of neck of right femur.  She presents with the following impairments/functional limitations:  weakness, impaired endurance, impaired functional mobilty, gait instability, impaired balance, decreased lower extremity function, pain, decreased ROM, impaired coordination, impaired skin, edema, orthopedic precautions ,pt with good participation and improving mobility,pt requires assistance with all mobility and will benefit from SNF upon discharge.    Rehab Prognosis:  Good; patient would benefit from acute skilled PT services to address these deficits and reach maximum level of function.      Recent Surgery: Procedure(s) (LRB):  ARTHROPLASTY-HIP-NOLBERTO (Right) 4 Days Post-Op    Plan:     During this hospitalization, patient to be seen BID to address the above listed problems via gait training, therapeutic activities, therapeutic exercises  · Plan of Care Expires:  11/30/18   Plan of Care Reviewed with: patient, daughter    Subjective     Communicated with nsg prior to session.  Patient found supine upon PT entry to room, agreeable to treatment.      Chief Complaint: n/a  Patient comments/goals: pt is leaving today  Pain/Comfort:  · Pain Rating 1: 4/10  · Location - Side 1: Right  · Location - Orientation 1: generalized  · Location 1: hip  · Pain Addressed 1: Pre-medicate for activity, Reposition, Distraction  · Pain Rating Post-Intervention 1: 4/10    Patients cultural, spiritual, Jewish conflicts given the current situation: None stated    Objective:     Patient found with: peripheral IV, telemetry     General Precautions: Standard, fall    Orthopedic Precautions:RLE weight bearing as tolerated   Braces: N/A     Functional Mobility:  · Bed Mobility:     · Supine to Sit: moderate assistance  · Transfers:     · Sit to Stand:  minimum assistance with rolling walker  · Bed to Chair: minimum assistance with  rolling walker  using  ambulation  · Gait: amb ~28' X 1 with RW and Min A with IV in tow  · Balance: good sitting balance      AM-PAC 6 CLICK MOBILITY  Turning over in bed (including adjusting bedclothes, sheets and blankets)?: 2  Sitting down on and standing up from a chair with arms (e.g., wheelchair, bedside commode, etc.): 3  Moving from lying on back to sitting on the side of the bed?: 2  Moving to and from a bed to a chair (including a wheelchair)?: 3  Need to walk in hospital room?: 3  Climbing 3-5 steps with a railing?: 1  Basic Mobility Total Score: 14       Therapeutic Activities and Exercises: le supine ex's X 15 reps inc: ap,qs,hs,abd/add,slr with assistance on R       Patient left up in chair with call button in reach, nsg notified and daughter present..    GOALS: see general POC  Multidisciplinary Problems     Physical Therapy Goals        Problem: Physical Therapy Goal    Goal Priority Disciplines Outcome Goal Variances Interventions   Physical Therapy Goal     PT, PT/OT Ongoing (interventions implemented as appropriate)     Description:  Goals to be met by: 11/30/18     Patient will increase functional independence with mobility by performing:    Supine <> sit spvn  Sit<>stand spvn  Pt will ambulate with  feet with spvn  Pt will perform LE TE x 15 reps in supine and sitting  Pt will ascend and descend 1 step with RW and CGA                      Time Tracking:     PT Received On: 11/14/18  PT Start Time: 0854     PT Stop Time: 0922  PT Total Time (min): 28 min     Billable Minutes: Gait Training 17 and Therapeutic Exercise 11    Treatment Type: Treatment  PT/PTA: PTA     PTA Visit Number: 4     Delmer Lopez PTA  11/14/2018

## 2018-11-15 ENCOUNTER — PATIENT OUTREACH (OUTPATIENT)
Dept: ADMINISTRATIVE | Facility: CLINIC | Age: 83
End: 2018-11-15

## 2018-11-15 VITALS
OXYGEN SATURATION: 94 % | HEIGHT: 58 IN | HEART RATE: 81 BPM | WEIGHT: 135.56 LBS | DIASTOLIC BLOOD PRESSURE: 69 MMHG | BODY MASS INDEX: 28.46 KG/M2 | TEMPERATURE: 98 F | SYSTOLIC BLOOD PRESSURE: 116 MMHG | RESPIRATION RATE: 20 BRPM

## 2018-11-15 LAB
ANION GAP SERPL CALC-SCNC: 8 MMOL/L
BASOPHILS # BLD AUTO: 0.01 K/UL
BASOPHILS NFR BLD: 0.1 %
BUN SERPL-MCNC: 19 MG/DL
CALCIUM SERPL-MCNC: 8.9 MG/DL
CHLORIDE SERPL-SCNC: 104 MMOL/L
CO2 SERPL-SCNC: 26 MMOL/L
CREAT SERPL-MCNC: 0.7 MG/DL
DIFFERENTIAL METHOD: ABNORMAL
EOSINOPHIL # BLD AUTO: 0.1 K/UL
EOSINOPHIL NFR BLD: 1.9 %
ERYTHROCYTE [DISTWIDTH] IN BLOOD BY AUTOMATED COUNT: 13 %
EST. GFR  (AFRICAN AMERICAN): >60 ML/MIN/1.73 M^2
EST. GFR  (NON AFRICAN AMERICAN): >60 ML/MIN/1.73 M^2
GLUCOSE SERPL-MCNC: 105 MG/DL
HCT VFR BLD AUTO: 32 %
HGB BLD-MCNC: 10.1 G/DL
LYMPHOCYTES # BLD AUTO: 1 K/UL
LYMPHOCYTES NFR BLD: 14.3 %
MCH RBC QN AUTO: 29.2 PG
MCHC RBC AUTO-ENTMCNC: 31.6 G/DL
MCV RBC AUTO: 93 FL
MONOCYTES # BLD AUTO: 1.3 K/UL
MONOCYTES NFR BLD: 19.1 %
NEUTROPHILS # BLD AUTO: 4.5 K/UL
NEUTROPHILS NFR BLD: 64 %
PLATELET # BLD AUTO: 348 K/UL
PMV BLD AUTO: 9.1 FL
POTASSIUM SERPL-SCNC: 3.9 MMOL/L
RBC # BLD AUTO: 3.46 M/UL
SODIUM SERPL-SCNC: 138 MMOL/L
WBC # BLD AUTO: 7 K/UL

## 2018-11-15 PROCEDURE — 80048 BASIC METABOLIC PNL TOTAL CA: CPT

## 2018-11-15 PROCEDURE — 85025 COMPLETE CBC W/AUTO DIFF WBC: CPT

## 2018-11-15 PROCEDURE — 36415 COLL VENOUS BLD VENIPUNCTURE: CPT

## 2018-11-15 RX ORDER — BISACODYL 10 MG
10 SUPPOSITORY, RECTAL RECTAL ONCE
Status: DISCONTINUED | OUTPATIENT
Start: 2018-11-15 | End: 2018-11-15 | Stop reason: HOSPADM

## 2018-11-15 NOTE — PT/OT/SLP DISCHARGE
Physical Therapy Discharge Summary    Name: Apple Watson  MRN: [id number]   Principal Problem: Closed fracture of neck of right femur     Patient Discharged from acute Physical Therapy on 11/14/2018.  Please refer to prior PT noted date on 11/14/2018 for functional status.     Assessment:     Patient appropriate for care in another setting.    Objective:     GOALS:   Multidisciplinary Problems     Physical Therapy Goals        Problem: Physical Therapy Goal    Goal Priority Disciplines Outcome Goal Variances Interventions   Physical Therapy Goal     PT, PT/OT Ongoing (interventions implemented as appropriate)     Description:  Goals to be met by: 11/30/18     Patient will increase functional independence with mobility by performing:    Supine <> sit spvn  Sit<>stand spvn  Pt will ambulate with  feet with spvn  Pt will perform LE TE x 15 reps in supine and sitting  Pt will ascend and descend 1 step with RW and CGA                      Reasons for Discontinuation of Therapy Services  Transfer to alternate level of care.      Plan:     Patient Discharged to: Skilled Nursing Facility.    Nolan Powers, PT  11/15/2018

## 2018-11-15 NOTE — PLAN OF CARE
Problem: Patient Care Overview  Goal: Plan of Care Review  Outcome: Ongoing (interventions implemented as appropriate)  Patient resting in bed, AAOx4. Daughter at the bedside. Medications administered as ordered. Patient asleep through the night. Ambulated to commode with assist, safety maintained. Encouraged to call with needs or concerns. Will continue to monitor.

## 2018-11-15 NOTE — PLAN OF CARE
Problem: Patient Care Overview  Goal: Plan of Care Review  Outcome: Ongoing (interventions implemented as appropriate)  Patient VSS. Denies pain. Incision to right hip dry & intact. Patient ambulated with PT. Remains free from falls. Daughter at bedside. Patient to be discharged to a SNF once she is able to have a bowel movement. POC reviewed. Patient voiced understanding.

## 2018-11-27 ENCOUNTER — HOSPITAL ENCOUNTER (INPATIENT)
Facility: HOSPITAL | Age: 83
LOS: 7 days | Discharge: HOSPICE | DRG: 023 | End: 2018-12-04
Attending: EMERGENCY MEDICINE | Admitting: PSYCHIATRY & NEUROLOGY
Payer: MEDICARE

## 2018-11-27 ENCOUNTER — TELEPHONE (OUTPATIENT)
Dept: FAMILY MEDICINE | Facility: CLINIC | Age: 83
End: 2018-11-27

## 2018-11-27 ENCOUNTER — HOSPITAL ENCOUNTER (EMERGENCY)
Facility: HOSPITAL | Age: 83
Discharge: ANOTHER HOSPITAL | End: 2018-11-27
Attending: FAMILY MEDICINE
Payer: MEDICARE

## 2018-11-27 VITALS
TEMPERATURE: 98 F | DIASTOLIC BLOOD PRESSURE: 68 MMHG | HEART RATE: 71 BPM | SYSTOLIC BLOOD PRESSURE: 151 MMHG | WEIGHT: 130 LBS | OXYGEN SATURATION: 100 % | HEIGHT: 58 IN | BODY MASS INDEX: 27.29 KG/M2 | RESPIRATION RATE: 22 BRPM

## 2018-11-27 DIAGNOSIS — I10 ESSENTIAL HYPERTENSION: ICD-10-CM

## 2018-11-27 DIAGNOSIS — Z92.82 RECEIVED INTRAVENOUS TISSUE PLASMINOGEN ACTIVATOR (TPA) IN EMERGENCY DEPARTMENT: ICD-10-CM

## 2018-11-27 DIAGNOSIS — I63.9 STROKE: ICD-10-CM

## 2018-11-27 DIAGNOSIS — I21.4 NSTEMI (NON-ST ELEVATED MYOCARDIAL INFARCTION): ICD-10-CM

## 2018-11-27 DIAGNOSIS — I48.0 PAROXYSMAL ATRIAL FIBRILLATION: ICD-10-CM

## 2018-11-27 DIAGNOSIS — I63.9 CEREBROVASCULAR ACCIDENT (CVA), UNSPECIFIED MECHANISM: Primary | ICD-10-CM

## 2018-11-27 DIAGNOSIS — I63.132 EMBOLIC STROKE INVOLVING LEFT CAROTID ARTERY: ICD-10-CM

## 2018-11-27 DIAGNOSIS — I48.91 ATRIAL FIBRILLATION WITH RVR: ICD-10-CM

## 2018-11-27 DIAGNOSIS — I49.01 VENTRICULAR FIBRILLATION: ICD-10-CM

## 2018-11-27 DIAGNOSIS — I63.9 STROKE: Primary | ICD-10-CM

## 2018-11-27 LAB
ALBUMIN SERPL BCP-MCNC: 3.4 G/DL
ALP SERPL-CCNC: 133 U/L
ALT SERPL W/O P-5'-P-CCNC: 12 U/L
ANION GAP SERPL CALC-SCNC: 7 MMOL/L
AST SERPL-CCNC: 28 U/L
BASOPHILS # BLD AUTO: 0.07 K/UL
BASOPHILS NFR BLD: 0.8 %
BILIRUB SERPL-MCNC: 0.6 MG/DL
BUN SERPL-MCNC: 26 MG/DL
CALCIUM SERPL-MCNC: 8.5 MG/DL
CHLORIDE SERPL-SCNC: 101 MMOL/L
CO2 SERPL-SCNC: 29 MMOL/L
CREAT SERPL-MCNC: 0.69 MG/DL
DIFFERENTIAL METHOD: ABNORMAL
EOSINOPHIL # BLD AUTO: 0.2 K/UL
EOSINOPHIL NFR BLD: 1.8 %
ERYTHROCYTE [DISTWIDTH] IN BLOOD BY AUTOMATED COUNT: 13.4 %
EST. GFR  (AFRICAN AMERICAN): >60 ML/MIN/1.73 M^2
EST. GFR  (NON AFRICAN AMERICAN): >60 ML/MIN/1.73 M^2
GLUCOSE SERPL-MCNC: 127 MG/DL
HCT VFR BLD AUTO: 36.6 %
HGB BLD-MCNC: 11 G/DL
INR PPP: 1.2
LYMPHOCYTES # BLD AUTO: 1.5 K/UL
LYMPHOCYTES NFR BLD: 16.8 %
MCH RBC QN AUTO: 28.6 PG
MCHC RBC AUTO-ENTMCNC: 30.1 G/DL
MCV RBC AUTO: 95 FL
MONOCYTES # BLD AUTO: 1.1 K/UL
MONOCYTES NFR BLD: 11.4 %
NEUTROPHILS # BLD AUTO: 6.3 K/UL
NEUTROPHILS NFR BLD: 68.8 %
PLATELET # BLD AUTO: 437 K/UL
PMV BLD AUTO: 9.5 FL
POTASSIUM SERPL-SCNC: 3.9 MMOL/L
PROT SERPL-MCNC: 6.6 G/DL
PROTHROMBIN TIME: 13.2 SEC
RBC # BLD AUTO: 3.84 M/UL
SODIUM SERPL-SCNC: 137 MMOL/L
TROPONIN I SERPL DL<=0.01 NG/ML-MCNC: <0.012 NG/ML
TSH SERPL DL<=0.005 MIU/L-ACNC: 1.91 UIU/ML
WBC # BLD AUTO: 9.19 K/UL

## 2018-11-27 PROCEDURE — 93010 ELECTROCARDIOGRAM REPORT: CPT | Mod: ,,, | Performed by: INTERNAL MEDICINE

## 2018-11-27 PROCEDURE — 25000003 PHARM REV CODE 250: Performed by: RADIOLOGY

## 2018-11-27 PROCEDURE — 25000003 PHARM REV CODE 250: Performed by: NURSE PRACTITIONER

## 2018-11-27 PROCEDURE — 96375 TX/PRO/DX INJ NEW DRUG ADDON: CPT

## 2018-11-27 PROCEDURE — 99285 EMERGENCY DEPT VISIT HI MDM: CPT | Mod: 25

## 2018-11-27 PROCEDURE — 63600175 PHARM REV CODE 636 W HCPCS: Performed by: RADIOLOGY

## 2018-11-27 PROCEDURE — 93005 ELECTROCARDIOGRAM TRACING: CPT

## 2018-11-27 PROCEDURE — A4216 STERILE WATER/SALINE, 10 ML: HCPCS | Performed by: NURSE PRACTITIONER

## 2018-11-27 PROCEDURE — 20000000 HC ICU ROOM

## 2018-11-27 PROCEDURE — 85610 PROTHROMBIN TIME: CPT

## 2018-11-27 PROCEDURE — 85025 COMPLETE CBC W/AUTO DIFF WBC: CPT

## 2018-11-27 PROCEDURE — 80053 COMPREHEN METABOLIC PANEL: CPT

## 2018-11-27 PROCEDURE — 99284 EMERGENCY DEPT VISIT MOD MDM: CPT | Mod: ,,, | Performed by: EMERGENCY MEDICINE

## 2018-11-27 PROCEDURE — 25500020 PHARM REV CODE 255: Performed by: EMERGENCY MEDICINE

## 2018-11-27 PROCEDURE — 37195 THROMBOLYTIC THERAPY STROKE: CPT

## 2018-11-27 PROCEDURE — 99223 1ST HOSP IP/OBS HIGH 75: CPT | Mod: ,,, | Performed by: PSYCHIATRY & NEUROLOGY

## 2018-11-27 PROCEDURE — 27000221 HC OXYGEN, UP TO 24 HOURS

## 2018-11-27 PROCEDURE — 63600175 PHARM REV CODE 636 W HCPCS: Mod: JG | Performed by: FAMILY MEDICINE

## 2018-11-27 PROCEDURE — 84484 ASSAY OF TROPONIN QUANT: CPT

## 2018-11-27 PROCEDURE — 63600175 PHARM REV CODE 636 W HCPCS: Performed by: PHYSICIAN ASSISTANT

## 2018-11-27 PROCEDURE — 99285 EMERGENCY DEPT VISIT HI MDM: CPT | Mod: 25,27

## 2018-11-27 PROCEDURE — 94760 N-INVAS EAR/PLS OXIMETRY 1: CPT

## 2018-11-27 PROCEDURE — G0508 CRIT CARE TELEHEA CONSULT 60: HCPCS | Mod: GT,,, | Performed by: PSYCHIATRY & NEUROLOGY

## 2018-11-27 PROCEDURE — 96374 THER/PROPH/DIAG INJ IV PUSH: CPT

## 2018-11-27 PROCEDURE — 84443 ASSAY THYROID STIM HORMONE: CPT

## 2018-11-27 PROCEDURE — 99291 CRITICAL CARE FIRST HOUR: CPT | Mod: GC,,, | Performed by: PSYCHIATRY & NEUROLOGY

## 2018-11-27 RX ORDER — MAGNESIUM SULFATE HEPTAHYDRATE 40 MG/ML
2 INJECTION, SOLUTION INTRAVENOUS
Status: DISCONTINUED | OUTPATIENT
Start: 2018-11-27 | End: 2018-11-30

## 2018-11-27 RX ORDER — ONDANSETRON 2 MG/ML
4 INJECTION INTRAMUSCULAR; INTRAVENOUS EVERY 6 HOURS PRN
Status: DISCONTINUED | OUTPATIENT
Start: 2018-11-27 | End: 2018-12-04 | Stop reason: HOSPADM

## 2018-11-27 RX ORDER — POTASSIUM CHLORIDE 7.45 MG/ML
40 INJECTION INTRAVENOUS
Status: DISCONTINUED | OUTPATIENT
Start: 2018-11-27 | End: 2018-11-27

## 2018-11-27 RX ORDER — FENTANYL CITRATE 50 UG/ML
INJECTION, SOLUTION INTRAMUSCULAR; INTRAVENOUS CODE/TRAUMA/SEDATION MEDICATION
Status: COMPLETED | OUTPATIENT
Start: 2018-11-27 | End: 2018-11-27

## 2018-11-27 RX ORDER — ACETAMINOPHEN 650 MG/1
650 SUPPOSITORY RECTAL EVERY 6 HOURS PRN
Status: DISCONTINUED | OUTPATIENT
Start: 2018-11-27 | End: 2018-12-04 | Stop reason: HOSPADM

## 2018-11-27 RX ORDER — DIPHENHYDRAMINE HYDROCHLORIDE 50 MG/ML
50 INJECTION INTRAMUSCULAR; INTRAVENOUS ONCE
Status: COMPLETED | OUTPATIENT
Start: 2018-11-27 | End: 2018-11-27

## 2018-11-27 RX ORDER — SODIUM CHLORIDE 9 MG/ML
INJECTION, SOLUTION INTRAVENOUS CONTINUOUS
Status: DISCONTINUED | OUTPATIENT
Start: 2018-11-27 | End: 2018-11-28

## 2018-11-27 RX ORDER — SODIUM CHLORIDE 0.9 % (FLUSH) 0.9 %
3 SYRINGE (ML) INJECTION EVERY 8 HOURS
Status: DISCONTINUED | OUTPATIENT
Start: 2018-11-27 | End: 2018-12-04 | Stop reason: HOSPADM

## 2018-11-27 RX ORDER — POTASSIUM CHLORIDE 7.45 MG/ML
60 INJECTION INTRAVENOUS
Status: DISCONTINUED | OUTPATIENT
Start: 2018-11-27 | End: 2018-11-27

## 2018-11-27 RX ORDER — CODEINE PHOSPHATE AND GUAIFENESIN 10; 100 MG/5ML; MG/5ML
10 SOLUTION ORAL EVERY 4 HOURS PRN
Qty: 240 ML | Refills: 0 | Status: ON HOLD | OUTPATIENT
Start: 2018-11-27 | End: 2018-12-03 | Stop reason: HOSPADM

## 2018-11-27 RX ORDER — NICARDIPINE HYDROCHLORIDE 0.2 MG/ML
5 INJECTION INTRAVENOUS CONTINUOUS
Status: DISCONTINUED | OUTPATIENT
Start: 2018-11-27 | End: 2018-11-29

## 2018-11-27 RX ORDER — SODIUM CHLORIDE 0.9 % (FLUSH) 0.9 %
5 SYRINGE (ML) INJECTION
Status: DISCONTINUED | OUTPATIENT
Start: 2018-11-27 | End: 2018-12-04 | Stop reason: HOSPADM

## 2018-11-27 RX ORDER — MAGNESIUM SULFATE HEPTAHYDRATE 40 MG/ML
4 INJECTION, SOLUTION INTRAVENOUS
Status: DISCONTINUED | OUTPATIENT
Start: 2018-11-27 | End: 2018-11-30

## 2018-11-27 RX ORDER — POTASSIUM CHLORIDE 7.45 MG/ML
80 INJECTION INTRAVENOUS
Status: DISCONTINUED | OUTPATIENT
Start: 2018-11-27 | End: 2018-11-27

## 2018-11-27 RX ADMIN — NICARDIPINE HYDROCHLORIDE 2.5 MG/HR: 0.2 INJECTION, SOLUTION INTRAVENOUS at 07:11

## 2018-11-27 RX ADMIN — IOHEXOL 100 ML: 350 INJECTION, SOLUTION INTRAVENOUS at 05:11

## 2018-11-27 RX ADMIN — DIPHENHYDRAMINE HYDROCHLORIDE 50 MG: 50 INJECTION INTRAMUSCULAR; INTRAVENOUS at 05:11

## 2018-11-27 RX ADMIN — ALTEPLASE 48 MG: KIT at 03:11

## 2018-11-27 RX ADMIN — SODIUM CHLORIDE: 0.9 INJECTION, SOLUTION INTRAVENOUS at 08:11

## 2018-11-27 RX ADMIN — FENTANYL CITRATE 25 MCG: 50 INJECTION, SOLUTION INTRAMUSCULAR; INTRAVENOUS at 06:11

## 2018-11-27 RX ADMIN — Medication 3 ML: at 09:11

## 2018-11-27 RX ADMIN — ALTEPLASE 5 MG: KIT at 03:11

## 2018-11-27 NOTE — ED NOTES
TPA infusing. Pt on cardiac monitor. Responds with head nodding to voice. Has not spoken since she got here. Will continue to monitor.

## 2018-11-27 NOTE — CONSULTS
[facility]  Vascular Neurology  Comprehensive Stroke Center  Tele-Consultation Note      Inpatient consult to Telemedicine-Stroke  Consult performed by: Servando Macedo MD  Consult ordered by: Benson Adams MD          Consulting Provider: Spoke Physician:: Benson Adams  Current Providers  No providers found    Patient Location: [facility]  Spoke hospital nurse at bedside with patient assisting consultant.     Patient information was obtained from relative(s).       Assessment/Plan:     STROKE DOCUMENTATION     Acute Stroke Times:   Acute Stroke Times   Stroke Team Called Time: 1511  Stroke Team Arrival Time: 1511  CT Interpretation Time: 1511  Decision to Treat Time for Alteplase: 1520    NIH Scale:  1a. Level Of Consciousness: 0-->Alert: keenly responsive(Simultaneous filing. User may not have seen previous data.)  1b. LOC Questions: 2-->Answers neither question correctly(Simultaneous filing. User may not have seen previous data.)  1c. LOC Commands: 2-->Performs neither task correctly(Simultaneous filing. User may not have seen previous data.)  2. Best Gaze: 2-->Forced deviation, or total gaze paresis not overcome by the oculocephalic maneuver(Simultaneous filing. User may not have seen previous data.)  3. Visual: 0-->No visual loss(Simultaneous filing. User may not have seen previous data.)  4. Facial Palsy: 2-->Partial paralysis (total or near-total paralysis of lower face)(Simultaneous filing. User may not have seen previous data.)  5a. Motor Arm, Left: 0-->No drift: limb holds 90 (or 45) degrees for full 10 secs(Simultaneous filing. User may not have seen previous data.)  5b. Motor Arm, Right: 4-->No movement(Simultaneous filing. User may not have seen previous data.)  6a. Motor Leg, Left: 0-->No drift: leg holds 30 degree position for full 5 secs(Simultaneous filing. User may not have seen previous data.)  6b. Motor Leg, Right: 4-->No  movement(Simultaneous filing. User may not have seen previous data.)  7. Limb Ataxia: 0-->Absent(Simultaneous filing. User may not have seen previous data.)  8. Sensory: 1-->Mild-to-moderate sensory loss: patient feels pinprick is less sharp or is dull on the affected side: or there is a loss of superficial pain with pinprick, but patient is aware of being touched(Simultaneous filing. User may not have seen previous data.)  9. Best Language: 3-->Mute, global aphasia: no usable speech or auditory comprehension(Simultaneous filing. User may not have seen previous data.)  10. Dysarthria: 2-->Severe dysarthria: patients speech is so slurred as to be unintelligible in the absence of or out of proportion to any dysphasia, or is mute/anarthric(Simultaneous filing. User may not have seen previous data.)  11. Extinction and Inattention (formerly Neglect): 0-->No abnormality(Simultaneous filing. User may not have seen previous data.)  Total (NIH Stroke Scale): 22(Simultaneous filing. User may not have seen previous data.)     Modified [place]    Mor Coma Scale:    ABCD2 Score:    [id number] Score:   HAS -BLED Score:   ICH Score:   Hunt & Tapia Classification:       Diagnoses:   Embolic stroke involving left carotid artery    Full left MCA syndrome, hyperdensity seen in distal left ICA corresponding.  Getting thrombolytics and transferring to main campus for possible thrombectomy. Case discussed with daughter at bedside, risk and benefit discussed.    Antithrombotics for secondary stroke prevention: Antiplatelets: None: Hold all Antithrombotics x 24 hours after IV t-PA administration    Statins for secondary stroke prevention and hyperlipidemia, if present:   Statins: Atorvastatin- 40 mg daily    Aggressive risk factor modification: HTN, HLD     Rehab efforts: PT/OT/SLP to evaluate and treat    Diagnostics ordered/pending: CTA Head to assess vasculature , CTA Neck/Arch to assess vasculature, HgbA1C to assess blood glucose  "levels, Lipid Profile to assess cholesterol levels, MRI head without contrast to assess brain parenchyma, TTE to assess cardiac function/status     VTE prophylaxis: none, thrombolytics    BP parameters: Infarct: Post tPA, SBP <180             Blood pressure 136/65, pulse 72, resp. rate (!) 22, height 4' 10" (1.473 m), weight 59 kg (130 lb), SpO2 100 %, not currently breastfeeding.  Alteplase Eligible?: Yes  Alteplase Recommendation:   Alteplase Total Dose:   Total dose: Alteplase 0.9mg/kg (max dose:90mg)                      ** based on acquired weight from facility   Bolus Dose:   10% of total Alteplase dose given intravenously over 1 minute   Continuous Infusion Dose:   Remaining 90% of total Alteplase dose infused intravenously over 60 minutes    **infusion must start at the same time as the bolus dose     Additional Recommendations:   1. Neurological assessment and vital signs (except temperature) every 15 minutes during Altaplase infusion.  2. Frequency of BP assessments may need to be increased if systolic BP stays >= 180 mm Hg or diastolic BP stays >= 105 mm Hg. Administer antihypertensive meds as ordered  3. Continue to monitor and control blood pressure and monitor for neurological deterioration every 15 minutes for the first hour after the infusion is stopped. Then every 30 minutes for the next 6 hours. Perform hourly monitoring from the 8th post-infusion hour until 24 hours post-infusion.  4. Temperature every 4 hours or as required.  5. Follow hospital protocol for further orders re: post tPA infusion patient management.  6. No antithrombotics or anticoagulants (including but not limited to: heparin, warfarin, aspirin, clopidigrel, or dipyridamole) for 24 hours, then start antithrombotics as ordered by treating physician    Adapted from the American Heart Association/American Stroke Association (AHA/ASA) and American Association of Neuroscience Nurses (AANN) Guidelines.   Possible Interventional " "Revascularization Candidate? Yes    Disposition Recommendation: transfer to [facility] by  air  stat      Subjective:     History of Present Illness:  89F @ 240 noted that she had right arm weakness and difficulty speaking. The aforementioned symptoms have never happened before. There are no identified triggers or modifying factors. There have been no recurrent events. There are no other associated symptoms.        Woke up with symptoms?: no  Last known normal:    1300    Recent bleeding noted: no  Does the patient take any Blood Thinners? no  Medications: No relevant medications    Past Medical History: hypertension, neuropathy, depression,     Past Surgical History: November 10th hip surgery    Family History: no relevant history    Social History: no smoking, no drinking, no drugs    Allergies: Celebrex [Celecoxib]  Cymbalta [Duloxetine]  Iodine And Iodide Containing Products  Lovastatin  Pneumococcal 23-Lexy Ps Vaccine  Sulpho-Lac [Sulfur]  Vioxx [Rofecoxib]  Savella [Milnacipran] No relevant allergies    Review of Systems   Constitutional: Negative for appetite change.   HENT: Negative for congestion.    Eyes: Negative for discharge.   Respiratory: Negative for shortness of breath.    Cardiovascular: Negative for chest pain.   Gastrointestinal: Negative for abdominal pain.   Endocrine: Negative for cold intolerance.   Genitourinary: Negative for difficulty urinating.   Musculoskeletal: Negative for joint swelling.   Skin: Negative for color change.     Objective:   Vitals: Blood pressure 136/65, pulse 72, resp. rate 18, height 4' 10" (1.473 m), weight 59 kg (130 lb), SpO2 97 %, not currently breastfeeding.     CT READ: Yes  No hemmorhage. No mass effect. No early infarct signs. Hyperdensity seen in distal left ICA    Physical Exam   Constitutional: No distress.   HENT:   Head: Normocephalic.   Right Ear: External ear normal.   Left Ear: External ear normal.   Eyes: Conjunctivae are normal.   Neck: " Normal range of motion.   Pulmonary/Chest: Effort normal. No stridor.   Abdominal: There is no tenderness.   Skin: Skin is dry. No rash noted.             Recommended the emergency room physician to have a brief discussion with the patient and/or family if available regarding the risks and benefits of treatment, and to briefly document the occurrence of that discussion in his clinical encounter note.     The attending portion of this evaluation, treatment, and documentation was performed per Servando Macedo MD via audiovisual.    Billing code:  (time dependent stroke, complex case, unstable patient, hemorrhages, any intervention, some mimics)    · This patient has a very critical neurological condition/illness, with very high morbidity and mortality.  · There is a very high probability for acute neurological change leading to clinical and possibly life-threatening deterioration requiring highest level of physician preparedness for urgent intervention.  · There is possibility that this condition will require treatment with high risk medications as quickly as possible.  · There is also a possibility that the patient may benefit from further, more advance complex therapies (e.g. endovascular therapy) that will require prompt diagnosis and care.  · Care was coordinated with other physicians involved in the patient's care.  · Radiologic studies and laboratory data were reviewed and interpreted, and plan of care was re-assessed based on the results.  · Diagnosis, treatment options and prognosis may have been discussed with the patient and/or family members or caregiver.  · Further advanced medical management and further evaluation is warranted for his care.      In your opinion, this was a: Tier 1    Consult End Time: 3:24 PM     Servando Macedo MD  [facility]  Vascular Neurology   [facility]

## 2018-11-27 NOTE — ED NOTES
..  Patient identifiers for Apple Watson 89 y.o. female checked and correct.  Chief Complaint   Patient presents with    TPA transfer     from [facility]     Past Medical History:   Diagnosis Date    Depression     Hypertension     Neuropathy      Allergies reported:   Review of patient's allergies indicates:   Allergen Reactions    Celebrex [celecoxib] Other (See Comments)     Blood in bowel    Cymbalta [duloxetine]     Iodine and iodide containing products     Lovastatin     Pneumococcal 23-gregorio ps vaccine     Sulpho-lac [sulfur]     Vioxx [rofecoxib]     Savella [milnacipran] Rash       Pt to ED room 4 from CT. Stroke team at bedside. Pt is weak of Right side.   LOC: Patient is awake, alert. Pt nonverbal, eyes open. Spontaneous movement of lower extremities.   APPEARANCE: Patient resting comfortably and in no acute distress. Patient is clean and well groomed, patient's clothing is properly fastened.  SKIN: The skin is warm and dry. Patient has normal skin turgor and moist mucus membranes. Skin is intact; no bruising or breakdown noted.  MUSKULOSKELETAL: Patient is moving all extremities well, no obvious deformities noted. Pulses intact.   RESPIRATORY: Airway is open and patent. Respirations are spontaneous and non-labored with normal effort and rate, BBS=clear  CARDIAC: Patient has a normal rate and rhythm. Afib on cardiac monitor,No peripheral edema noted.   ABDOMEN: No distention noted. Bowel sounds active in all 4 quadrants. Soft and non-tender upon palpation.

## 2018-11-27 NOTE — PROGRESS NOTES
Assumed care of patient in IR. Report received from SARAH Cano. Unable to do complete neuro assessment and NIH as patient is being prepped in sterile field. VSS. /73. SpO2 98% on 3LNC. . Pt appears to move L side spontaneously. Will continue to monitor.

## 2018-11-27 NOTE — TELEPHONE ENCOUNTER
----- Message from Sandee Lanza sent at 11/27/2018  3:31 PM CST -----  Patient had a stroke and is being admitted to [facility]. Thanks

## 2018-11-27 NOTE — HPI
Apple Watson is a 89 y.o. female with PMHx who presented as transfer from [facility] with L MCA syndrome. Patient was treated with tPA. Hyperdense MCA sign seen on CT head and patient transferred for possible intervention. On arrival, patient without improvement in symptoms.       At home taking a nap before home health therapy. Daughter woke her up and noted RSW and patient was nonverbal. Patient would not stand up and patient would not respond. Family states patient had a recent surgery on 11/10 for hip replacement and patient had abnormal heart rhythm afterwards. Patient family unaware of type of rhythm.

## 2018-11-27 NOTE — H&P
Inpatient Radiology Pre-procedure Note    History of Present Illness:  Apple Watson is a 89 y.o. female who presents for cerebral angiogram with possible thrombectomy.  Admission H&P reviewed.  Past Medical History:   Diagnosis Date    Depression     Hypertension     Neuropathy      Past Surgical History:   Procedure Laterality Date    APPENDECTOMY      ARTHROPLASTY-HIP-NOLBERTO Right 11/10/2018    Performed by Nasir Danielle MD at [facility] OR    GALLBLADDER SURGERY      HEMIARTHROPLASTY OF HIP Right 11/10/2018    Procedure: ARTHROPLASTY-HIP-NOLBERTO;  Surgeon: Nasir Danielle MD;  Location: [facility] OR;  Service: Orthopedics;  Laterality: Right;  Depuy Bipolar  LAteral positioner with positioners    HYSTERECTOMY      TONSILLECTOMY         Review of Systems:   As documented in primary team H&P    Home Meds:   Prior to Admission medications    Medication Sig Start Date End Date Taking? Authorizing Provider   amitriptyline (ELAVIL) 10 MG tablet Take 1 tablet (10 mg total) by mouth 2 (two) times daily. 1/10/18   Tree Rolon MD   calcium carb/vit D3/minerals (CALCIUM CARBONATE-VIT D3-MIN) 600 mg (1,500 mg)-400 unit Chew Take 1 tablet by mouth once daily. 11/11/18   Bev Madden,    cyanocobalamin (VITAMIN B-12) 250 MCG tablet Take 250 mcg by mouth once daily.    Historical Provider, MD   ergocalciferol (VITAMIN D2) 50,000 unit Cap Take 50,000 Units by mouth every 7 days.    Historical Provider, MD   gabapentin (NEURONTIN) 300 MG capsule Take 1 capsule (300 mg total) by mouth every evening. 2/9/17 11/27/18  Yakov Dickens III, MD   guaifenesin-codeine 100-10 mg/5 ml (TUSSI-ORGANIDIN NR)  mg/5 mL syrup Take 10 mLs by mouth every 4 (four) hours as needed for Cough. 11/27/18 12/7/18  Tree Rolon MD   HYDROcodone-acetaminophen (NORCO)  mg per tablet Take 1 tablet by mouth every 6 (six) hours as needed for Pain. 11/14/18   Bev Madden DO   verapamil (CALAN-SR) 240 MG  "CR tablet Take 1 tablet (240 mg total) by mouth once daily. 1/10/18   Tree Rolon MD   vitamin A 8000 UNIT capsule Take 8,000 Units by mouth once daily.    Historical Provider, MD     Scheduled Meds:   Continuous Infusions:   PRN Meds:  Anticoagulants/Antiplatelets: no anticoagulation    Allergies:   Review of patient's allergies indicates:   Allergen Reactions    Celebrex [celecoxib] Other (See Comments)     Blood in bowel    Cymbalta [duloxetine]     Iodine and iodide containing products     Lovastatin     Pneumococcal 23-gregorio ps vaccine     Sulpho-lac [sulfur]     Vioxx [rofecoxib]     Savella [milnacipran] Rash     Sedation Hx: have not been any systemic reactions    Labs:  Recent Labs   Lab 11/27/18  1509   INR 1.2       Recent Labs   Lab 11/27/18  1509   WBC 9.19   HGB 11.0*   HCT 36.6*   MCV 95   *      Recent Labs   Lab 11/27/18  1509   *      K 3.9      CO2 29   BUN 26*   CREATININE 0.69   CALCIUM 8.5*   ALT 12   AST 28   ALBUMIN 3.4*   BILITOT 0.6         Vitals:  Pulse: 88 (11/27/18 1650)  Resp: 18 (11/27/18 1650)  BP: (!) 148/73 (11/27/18 1650)  SpO2: (!) 94 % (11/27/18 1650)     Physical Exam:  ASA: 3  Mallampati: 3    General: somolent  HEENT: normocephalic, atraumatic  Chest: unlabored breathing  Heart: irregular heart rate  Abdomen: nondistended  Neuro" Aphasic, does not move right side.    Plan: Cerebral angiogram with planned thromobectomy for left terminus occlusion   Sedation Plan: Light to moderate.    Jerod Mcclelland MD  Department of Radiology  Pager: [phone]  "

## 2018-11-27 NOTE — ASSESSMENT & PLAN NOTE
Full left MCA syndrome, hyperdensity seen in distal left ICA corresponding.  Getting thrombolytics and transferring to main campus for possible thrombectomy. Case discussed with daughter at bedside, risk and benefit discussed.    Antithrombotics for secondary stroke prevention: Antiplatelets: None: Hold all Antithrombotics x 24 hours after IV t-PA administration    Statins for secondary stroke prevention and hyperlipidemia, if present:   Statins: Atorvastatin- 40 mg daily    Aggressive risk factor modification: HTN, HLD     Rehab efforts: PT/OT/SLP to evaluate and treat    Diagnostics ordered/pending: CTA Head to assess vasculature , CTA Neck/Arch to assess vasculature, HgbA1C to assess blood glucose levels, Lipid Profile to assess cholesterol levels, MRI head without contrast to assess brain parenchyma, TTE to assess cardiac function/status     VTE prophylaxis: none, thrombolytics    BP parameters: Infarct: Post tPA, SBP <180

## 2018-11-27 NOTE — ED NOTES
Called [facility] to check on status of transfer- spoke with dispatch stated they are about 1 minute out.

## 2018-11-27 NOTE — ED TRIAGE NOTES
"Pt presents to ED with daughter as historian. Arrives via EMS. Last seen normal around 1300. Around 1445, daughter states she became "floppy" with rigbht side hanging over. Pt has only left eye open with gaze to left. Can slightly squeeze left hand. Unable to move right hand. Pt with right sided facial droop. She is aphasic. Usually AAO x 4. Daughter states hip surgery on 11/10 and was doing better. Denies being on blood thinner. Pt lived alone up until surgery.   "

## 2018-11-27 NOTE — SEDATION DOCUMENTATION
Pt to Interventional Radiology room 190 via stretcher for cerebral angiogram with possible thrombectomy. Pt transferred to procedure table in the supine position. No complaints of pain or discomfort at this time. Procedure site (right groin) prepped by Dr Mcclelland.

## 2018-11-27 NOTE — TELEPHONE ENCOUNTER
----- Message from Sandee Lanza sent at 11/27/2018 10:26 AM CST -----  Contact: [phone]/self  Patient is requesting something for a cough. Thanks

## 2018-11-27 NOTE — HPI
89F @ 240 noted that she had right arm weakness and difficulty speaking. The aforementioned symptoms have never happened before. There are no identified triggers or modifying factors. There have been no recurrent events. There are no other associated symptoms.

## 2018-11-27 NOTE — ED TRIAGE NOTES
Pt transferred from [facility]. Pt last seen normal at 1300 today. Pt given tpa 5 mg bolus at 1532 and TPA infusion started at 1542. TPA infusion finished pta at 1642.

## 2018-11-27 NOTE — ED PROVIDER NOTES
Encounter Date: 11/27/2018    SCRIBE #1 NOTE: I, Shell Waite, am scribing for, and in the presence of, Breonna Ogden MD.       History     Chief Complaint   Patient presents with    TPA transfer     from [facility]     Patient is a transfer from an outside hospital. Patient presents with severe right sided weakness and aphasia. A telemedicine stroke consult was done. Her NIH stroke scale was 22. She was in the tPA window, so she was given tPA. Transferred here for further treatment.      The history is provided by the patient and medical records.     Review of patient's allergies indicates:   Allergen Reactions    Celebrex [celecoxib] Other (See Comments)     Blood in bowel    Cymbalta [duloxetine]     Iodine and iodide containing products     Lovastatin     Pneumococcal 23-gregorio ps vaccine     Sulpho-lac [sulfur]     Vioxx [rofecoxib]     Savella [milnacipran] Rash     Past Medical History:   Diagnosis Date    Atrial fibrillation with RVR 11/28/2018    Depression     Hypertension     Neuropathy      Past Surgical History:   Procedure Laterality Date    APPENDECTOMY      ARTHROPLASTY-HIP-NOLBERTO Right 11/10/2018    Performed by Nasir Danielle MD at [facility] OR    GALLBLADDER SURGERY      HEMIARTHROPLASTY OF HIP Right 11/10/2018    Procedure: ARTHROPLASTY-HIP-NOLBERTO;  Surgeon: Nasir Danielle MD;  Location: [facility] OR;  Service: Orthopedics;  Laterality: Right;  Depuy Bipolar  LAteral positioner with positioners    HYSTERECTOMY      TONSILLECTOMY       History reviewed. No pertinent family history.  Social History     Tobacco Use    Smoking status: Never Smoker    Smokeless tobacco: Never Used   Substance Use Topics    Alcohol use: No    Drug use: No     Review of Systems    Physical Exam     Initial Vitals [11/27/18 1647]   BP Pulse Resp Temp SpO2   (!) 140/79 81 20 98.5 °F (36.9 °C) 100 %      MAP       --         Physical Exam    Nursing note and vitals reviewed.  Constitutional:  "  Patient is very ill appearing.   HENT:   Mouth/Throat: Oropharynx is clear and moist.   Eyes: Conjunctivae are normal.   Neck: Neck supple.   Pulmonary/Chest: No respiratory distress.   Abdominal: She exhibits no distension.   Musculoskeletal: She exhibits no edema.   Neurological:   Appears lethargic. Left gaze preference. Right hemiplegia Global aphasia.   Skin: Skin is warm and dry.         ED Course   Procedures  Labs Reviewed   DRUG SCREEN PANEL, URINE EMERGENCY   POCT GLUCOSE MONITORING CONTINUOUS          Imaging Results          X-Ray Chest AP Single View (Final result)  Result time 11/27/18 20:27:34    Final result by Christopher Fuentes MD (11/27/18 20:27:34)                 Impression:      Bilateral airspace opacities suggestive of pulmonary edema, more pronounced when compared with the prior exam.      Electronically signed by: Christopher Fuentes MD  Date:    11/27/2018  Time:    20:27             Narrative:    EXAMINATION:  XR CHEST 1 VIEW    CLINICAL HISTORY:  Provided history is "r/o pna;  ".    TECHNIQUE:  One view of the chest.    COMPARISON:  11/27/2018 at 14:58.    FINDINGS:  Cardiac silhouette is stable.  Tortuous thoracic aorta with atherosclerotic calcifications.  There are coarsened interstitial lung markings and bilateral airspace opacities, slightly more pronounced when compared with the prior exam.  No large pleural effusion.  No pneumothorax.  Surgical clips overlie the right upper quadrant.                               IR Angiogram Carotid Cerebral Bilateral inc Arch (In process)                CTA STROKE MULTI-PHASE (Final result)  Result time 11/27/18 18:20:02    Final result by Zay Ayala MD (11/27/18 18:20:02)                 Impression:      Partial opacification of the left cervical ICA, without meaningful opacification of the ICA distally or intracranially, findings suggests thrombus, either within the vessel itself, or the carotid terminus.  There is some flow within the " distal M2 and M3 branches on the left, with improved flow on phase 2 and face 3 however no meaningful flow is identified within the M1 segment on the left.  These findings likely correlate with vague hypoattenuation seen within the region of the left basal ganglia and partially left caudate/subinsular region.    No additional regions of high-grade stenosis or occlusion, please see above for full details.    Heterogeneous appearance of the thyroid, ultrasound could be performed for additional evaluation as warranted.    Several additional findings above.      Electronically signed by: Zay Ayala MD  Date:    11/27/2018  Time:    18:20             Narrative:    EXAMINATION:  CTA STROKE MULTI-PHASE    CLINICAL HISTORY:  Ataxia, stroke suspected as etiology;    TECHNIQUE:  Non contrast low dose axial images were obtained thought the head. CT angiogram was performed from the level of the vadim to the top of the head following the IV administration of Omnipaque 350.   Sagittal and coronal reconstructions and maximum intensity projection reconstructions were performed. Arterial stenosis percentages are based on NASCET measurement criteria.  Two additional phases of immediate post-contrast CTA images were performed through the head alone.    COMPARISON:  CT 11/27/2018, 11/09/2018    FINDINGS:  CT head without contrast:    There is generalized cerebral volume loss.  There is hypoattenuation in a periventricular fashion, likely sequela of chronic microvascular ischemic change.There is hypoattenuation in the left frontoparietal region, similar in appearance to the examination of 11/09/2018.  Additional focus of hypoattenuation noted adjacent to the anterior aspect of the right lateral ventricle.  There is hypoattenuation involving the left thalamus, appears more prominent than on the examination of 11/09/2018.  There is left inferior temporal encephalomalacia versus arachnoid cyst.  There is no evidence of acute major  vascular territory infarct, hemorrhage, or mass.  There is no hydrocephalus.  There are no abnormal extra-axial fluid collections.  There is opacification of the inferior most left mastoid air cells, otherwise the visualized paranasal sinuses and mastoid air cells are clear, and there is no evidence of calvarial fracture.  The visualized soft tissues are unremarkable.    CTA head and neck multiphase:    There is atherosclerotic calcification of the aorta and its main branches.  The bilateral common carotid arteries are patent.  There is calcification at the bilateral carotid bifurcations.  The origin of the right internal carotid artery is patent, and the right internal carotid artery is patent throughout its cervical and proximal intracranial course.  There is loss of opacification of the proximal aspect of the left internal carotid artery, just distal to its origin and remains unopacified throughout its cervical and intracranial course noting some weak opacification is present on the 3rd phase.  The bilateral vertebral arteries are patent noting the left vertebral artery is dominant.  The intracranial posterior circulation is patent without significant stenosis or occlusion.  The right MCA and distal branches appear patent.  There is filling of the contralateral A1 segment of the left MINA through a patent anterior communicating artery as supplied by the A1 of the right MINA.  The MINA branches appear otherwise patent.  There is a paucity of opacification of the right MCA branch vessels, findings suggest occlusion at the carotid terminus.  On the 3rd phase, there is somewhat symmetric flow bilaterally.  This suggests grade 4, possibly grade 3 collaterals.    The visualized portions of the lung apices are grossly unremarkable.  There is slow flow through the brachiocephalic vessel.  There is heterogeneous enlargement of the thyroid bilaterally, right greater than left, ultrasound could be performed for further  evaluation as warranted.  No significant cervical lymphadenopathy.  Scattered foci of venous air are noted, likely related to injection.  There mucous retention cysts or polyps within the left maxillary sinus.  There is patchy opacification of the inferior most left mastoid air cells.  The globes are intact.  The dural venous sinuses are patent.                                 Medical Decision Making:   History:   Old Medical Records: I decided to obtain old medical records.  Old Records Summarized: records from another hospital.  Initial Assessment:   88 yo f, acute massive stroke, s/p TPA, transferred from OSH for further stroke eval    On arrival to ED, evaluated by vascular neuro and IR, CTA done and + LVO so pt transferred immediately to IR suite for interventional procedure, then plan for neuro ICU admission            Scribe Attestation:   Scribe #1: I performed the above scribed service and the documentation accurately describes the services I performed. I attest to the accuracy of the note.    I, Dr. Breonna Ogden, personally performed the services described in this documentation. All medical record entries made by the scribe were at my direction and in my presence.  I have reviewed the chart and agree that the record reflects my personal performance and is accurate and complete. Breonna Ogden MD.  10:44 PM 11/29/2018           Clinical Impression:   The primary encounter diagnosis was Cerebrovascular accident (CVA), unspecified mechanism. Diagnoses of Received intravenous tissue plasminogen activator (tPA) in emergency department, Stroke, Embolic stroke involving left carotid artery, NSTEMI (non-ST elevated myocardial infarction), Paroxysmal atrial fibrillation, Ventricular fibrillation, and Essential hypertension were also pertinent to this visit.                             Breonna Ogden MD  11/29/18 0196

## 2018-11-27 NOTE — ED PROVIDER NOTES
Encounter Date: 11/27/2018       History     Chief Complaint   Patient presents with    Cerebrovascular Accident     Daughter reports right sides paralysis that started around 1440.      89-year-old patient having right-sided paralysis last seen normal at 2:10:00 p.m. apparently patient awoke from her nap was then went limp on the right side.  Patient currently has her eyes closed speaking otherwise has exhibited no vomiting and is not expressing any complaints.  Patient has not been sick the last couple of days          Review of patient's allergies indicates:   Allergen Reactions    Celebrex [celecoxib] Other (See Comments)     Blood in bowel    Cymbalta [duloxetine]     Iodine and iodide containing products     Lovastatin     Pneumococcal 23-gregorio ps vaccine     Sulpho-lac [sulfur]     Vioxx [rofecoxib]     Savella [milnacipran] Rash     Past Medical History:   Diagnosis Date    Depression     Hypertension     Neuropathy      Past Surgical History:   Procedure Laterality Date    APPENDECTOMY      ARTHROPLASTY-HIP-NOLBERTO Right 11/10/2018    Performed by Nasir Danielle MD at [facility] OR    GALLBLADDER SURGERY      HEMIARTHROPLASTY OF HIP Right 11/10/2018    Procedure: ARTHROPLASTY-HIP-NOLBERTO;  Surgeon: Nasir Danielle MD;  Location: [facility] OR;  Service: Orthopedics;  Laterality: Right;  Depuy Bipolar  LAteral positioner with positioners    HYSTERECTOMY      TONSILLECTOMY       History reviewed. No pertinent family history.  Social History     Tobacco Use    Smoking status: Never Smoker    Smokeless tobacco: Never Used   Substance Use Topics    Alcohol use: No    Drug use: No     Review of Systems   Unable to perform ROS: Mental status change       Physical Exam     Initial Vitals [11/27/18 1502]   BP Pulse Resp Temp SpO2   136/65 74 12 -- 99 %      MAP       --         Physical Exam    Nursing note and vitals reviewed.  Constitutional: She appears well-developed.   HENT:   Head: Normocephalic and  atraumatic.   Right Ear: External ear normal.   Left Ear: External ear normal.   Nose: Nose normal.   Mouth/Throat: Oropharynx is clear and moist.   Eyes: Conjunctivae and EOM are normal. Pupils are equal, round, and reactive to light. Right eye exhibits no discharge. Left eye exhibits no discharge.   Neck: Normal range of motion. Neck supple. No tracheal deviation present.   Cardiovascular: Normal rate, regular rhythm and normal heart sounds.   No murmur heard.  Pulmonary/Chest: Breath sounds normal. No respiratory distress. She has no wheezes.   Abdominal: Soft. Bowel sounds are normal.   Neurological:   Patient exhibiting weakness on the right side 2 in 5 upper and lower extremity   Skin: Skin is warm and dry.         ED Course   Procedures  Labs Reviewed   CBC W/ AUTO DIFFERENTIAL   COMPREHENSIVE METABOLIC PANEL   PROTIME-INR   TSH   TROPONIN I   POCT GLUCOSE, HAND-HELD DEVICE     EKG Readings: (Independently Interpreted)   Patient's EKG shows atrial fibrillation with a rate of 75 no ST segment elevation depression or T-wave inversion no signs of STEMI no ectopy       Imaging Results          X-Ray Chest AP Portable (In process)  Result time 11/27/18 15:09:22               CT Head Without Contrast (In process)                  Medical Decision Making:   Initial Assessment:   Patient in moderate distress and no other complains    Differential Diagnosis:   Headache  Migraine  Stroke  Aneurysm    Clinical Tests:   Lab Tests: Ordered  Radiological Study: Ordered  Medical Tests: Ordered  ED Management:  When patient arrived by EMS they went directly to CT exam  Patient underwent neuro exam by tele Neuro and decision was made by the neurologist to use tPA which was ordered as an initial bolus dose over 15 min and then a 1 hr infusion all weight based                      Clinical Impression:   The encounter diagnosis was Stroke.                             Benson Adams MD  11/27/18 [id number]

## 2018-11-27 NOTE — TELEPHONE ENCOUNTER
Called pt daughter back and she states that the ambulance is there and that she is breathing but not responding wanted me to let Dr Rolon know

## 2018-11-27 NOTE — SUBJECTIVE & OBJECTIVE
"  Woke up with symptoms?: no  Last known normal:    1300    Recent bleeding noted: no  Does the patient take any Blood Thinners? no  Medications: No relevant medications    Past Medical History: hypertension, neuropathy, depression,     Past Surgical History: November 10th hip surgery    Family History: no relevant history    Social History: no smoking, no drinking, no drugs    Allergies: Celebrex [Celecoxib]  Cymbalta [Duloxetine]  Iodine And Iodide Containing Products  Lovastatin  Pneumococcal 23-Lexy Ps Vaccine  Sulpho-Lac [Sulfur]  Vioxx [Rofecoxib]  Savella [Milnacipran] No relevant allergies    Review of Systems   Constitutional: Negative for appetite change.   HENT: Negative for congestion.    Eyes: Negative for discharge.   Respiratory: Negative for shortness of breath.    Cardiovascular: Negative for chest pain.   Gastrointestinal: Negative for abdominal pain.   Endocrine: Negative for cold intolerance.   Genitourinary: Negative for difficulty urinating.   Musculoskeletal: Negative for joint swelling.   Skin: Negative for color change.     Objective:   Vitals: Blood pressure 136/65, pulse 72, resp. rate 18, height 4' 10" (1.473 m), weight 59 kg (130 lb), SpO2 97 %, not currently breastfeeding.     CT READ: Yes  No hemmorhage. No mass effect. No early infarct signs. Hyperdensity seen in distal left ICA    Physical Exam   Constitutional: No distress.   HENT:   Head: Normocephalic.   Right Ear: External ear normal.   Left Ear: External ear normal.   Eyes: Conjunctivae are normal.   Neck: Normal range of motion.   Pulmonary/Chest: Effort normal. No stridor.   Abdominal: There is no tenderness.   Skin: Skin is dry. No rash noted.         "

## 2018-11-27 NOTE — SUBJECTIVE & OBJECTIVE
Past Medical History:   Diagnosis Date    Depression     Hypertension     Neuropathy      Past Surgical History:   Procedure Laterality Date    APPENDECTOMY      ARTHROPLASTY-HIP-NOLBERTO Right 11/10/2018    Performed by [facility]ir [facility]le MD at [facility] OR    GALLBLADDER SURGERY      HEMIARTHROPLASTY OF HIP Right 11/10/2018    Procedure: ARTHROPLASTY-HIP-NOLBERTO;  Surgeon: Nasir Danielle MD;  Location: [facility] OR;  Service: Orthopedics;  Laterality: Right;  Depuy Bipolar  LAteral positioner with positioners    HYSTERECTOMY      TONSILLECTOMY       No family history on file.  Social History     Tobacco Use    Smoking status: Never Smoker    Smokeless tobacco: Never Used   Substance Use Topics    Alcohol use: No    Drug use: No     Review of patient's allergies indicates:   Allergen Reactions    Celebrex [celecoxib] Other (See Comments)     Blood in bowel    Cymbalta [duloxetine]     Iodine and iodide containing products     Lovastatin     Pneumococcal 23-gregorio ps vaccine     Sulpho-lac [sulfur]     Vioxx [rofecoxib]     Savella [milnacipran] Rash       Medications: I have reviewed the current medication administration record.      (Not in a hospital admission)    Review of Systems   Constitutional: Negative for chills and fever.   Respiratory: Negative for cough.    Cardiovascular: Negative for chest pain.   Gastrointestinal: Negative for nausea and vomiting.   Neurological: Positive for facial asymmetry, speech difficulty and weakness.     Objective:     Vital Signs (Most Recent):  Pulse: 88 (11/27/18 1650)  Resp: 18 (11/27/18 1650)  BP: (!) 148/73 (11/27/18 1650)  SpO2: (!) 94 % (11/27/18 1650)    Vital Signs Range (Last 24H):  Temp:  [98.2 °F (36.8 °C)]   Pulse:  [68-88]   Resp:  [12-25]   BP: (136-155)/(65-73)   SpO2:  [94 %-100 %]     Physical Exam   Constitutional: She appears well-developed and well-nourished. No distress.   HENT:   Head: Normocephalic and atraumatic.   Eyes: Pupils are equal,  round, and reactive to light.   Cardiovascular: Normal rate.   Pulmonary/Chest: Effort normal. No respiratory distress.   Neurological: She is alert.   Skin: Skin is warm and dry.   Vitals reviewed.      Neurological Exam:   LOC: alert  Attention Span: poor  Language: Global aphasia  Articulation: Untestable due to severe aphasia   Orientation: Untestable due to severe aphasia   Visual Fields: Hemianopsia right  EOM (CN III, IV, VI): Gaze preference  left  Pupils (CN II, III): PERRL  Facial Movement (CN VII): Lower facial weakness on the Right  Motor: Arm left  Normal 5/5  Leg left  Normal 5/5  Arm right  Plegia 0/5  Leg right Plegia 0/5  Sensation: Intact to light touch, temperature and vibration  Tone: Flaccid  RUE and RLE       Laboratory:  CMP:   Recent Labs   Lab 11/27/18  1509   CALCIUM 8.5*   ALBUMIN 3.4*   PROT 6.6      K 3.9   CO2 29      BUN 26*   CREATININE 0.69   ALKPHOS 133*   ALT 12   AST 28   BILITOT 0.6     CBC:   Recent Labs   Lab 11/27/18  1509   WBC 9.19   RBC 3.84*   HGB 11.0*   HCT 36.6*   *   MCV 95   MCH 28.6   MCHC 30.1*     Lipid Panel: No results for input(s): CHOL, LDLCALC, HDL, TRIG in the last 168 hours.  Coagulation:   Recent Labs   Lab 11/27/18  1509   INR 1.2     Hgb A1C: No results for input(s): HGBA1C in the last 168 hours.  TSH:   Recent Labs   Lab 11/27/18  1509   TSH 1.910       Diagnostic Results:      Brain imaging:  CT Head. Date: 11/27/18  No CT evidence of acute intracranial abnormality.    All CT scans at this facility are performed  using dose modulation techniques as appropriate to performed exam including the following:  automated exposure control; adjustment of mA and/or kV according to the patients size (this includes techniques or standardized protocols for targeted exams where dose is matched to indication/reason for exam: i.e. extremities or head);  iterative reconstruction technique.    Vessel Imaging:  CTA Multiphase. Date: 11/27/18  Partial  opacification of the left cervical ICA, without meaningful opacification of the ICA distally or intracranially, findings suggests thrombus, either within the vessel itself, or the carotid terminus.  There is some flow within the distal M2 and M3 branches on the left, with improved flow on phase 2 and face 3 however no meaningful flow is identified within the M1 segment on the left.  These findings likely correlate with vague hypoattenuation seen within the region of the left basal ganglia and partially left caudate/subinsular region.    No additional regions of high-grade stenosis or occlusion, please see above for full details.    Heterogeneous appearance of the thyroid, ultrasound could be performed for additional evaluation as warranted.    Cardiac Evaluation:   Echo. Date: 11/12/18  · Left ventricle shows concentric remodeling.  · Left ventricle ejection fraction is at 60%  · RV systolic function is normal.  · Left atrium is severely dilated.  · Mild aortic valve stenosis.  · Aortic valve area is 1.10 cm2; peak velocity is 1.92 m/s; mean gradient is 9.73 mmHg.  · Possible aortic bioprosthetic valve  · Trace aortic regurgitation.  · Mild to moderate mitral stenosis.  · Mild to moderate tricuspid regurgitation.  · Normal central venous pressure (3 mm Hg).  · The estimated PA systolic pressure is 30.67 mm Hg

## 2018-11-27 NOTE — TELEPHONE ENCOUNTER
Called pt daughter and she states that with the weather changes the pt is having a drip causing her to cough all day and worse at night would like something called in for cough sent to [facility]

## 2018-11-28 PROBLEM — I48.91 ATRIAL FIBRILLATION: Status: ACTIVE | Noted: 2018-11-27

## 2018-11-28 PROBLEM — I48.91 ATRIAL FIBRILLATION WITH RVR: Status: ACTIVE | Noted: 2018-11-28

## 2018-11-28 LAB
ABO + RH BLD: NORMAL
ALBUMIN SERPL BCP-MCNC: 2.6 G/DL
ALLENS TEST: ABNORMAL
ALP SERPL-CCNC: 164 U/L
ALT SERPL W/O P-5'-P-CCNC: 31 U/L
ANION GAP SERPL CALC-SCNC: 10 MMOL/L
APTT BLDCRRT: 24.7 SEC
ASCENDING AORTA: 2.94 CM
AST SERPL-CCNC: 152 U/L
AV INDEX (PROSTH): 0.42
AV MEAN GRADIENT: 7.81 MMHG
AV PEAK GRADIENT: 12.96 MMHG
AV VALVE AREA: 1.34 CM2
BASOPHILS # BLD AUTO: 0.06 K/UL
BASOPHILS # BLD AUTO: 0.06 K/UL
BASOPHILS NFR BLD: 0.4 %
BASOPHILS NFR BLD: 0.4 %
BILIRUB SERPL-MCNC: 0.6 MG/DL
BLD GP AB SCN CELLS X3 SERPL QL: NORMAL
BSA FOR ECHO PROCEDURE: 1.63 M2
BUN SERPL-MCNC: 25 MG/DL
CALCIUM SERPL-MCNC: 8.4 MG/DL
CHLORIDE SERPL-SCNC: 106 MMOL/L
CHOLEST SERPL-MCNC: 147 MG/DL
CHOLEST/HDLC SERPL: 3.2 {RATIO}
CK MB SERPL-MCNC: 186.7 NG/ML
CK MB SERPL-MCNC: 275.8 NG/ML
CK MB SERPL-MCNC: 282.4 NG/ML
CK MB SERPL-MCNC: 98.7 NG/ML
CK MB SERPL-RTO: 10.3 %
CK MB SERPL-RTO: 13.8 %
CK MB SERPL-RTO: 17.4 %
CK MB SERPL-RTO: 19.4 %
CK SERPL-CCNC: 1353 U/L
CK SERPL-CCNC: 1421 U/L
CK SERPL-CCNC: 1623 U/L
CK SERPL-CCNC: 956 U/L
CO2 SERPL-SCNC: 25 MMOL/L
CREAT SERPL-MCNC: 0.7 MG/DL
CV ECHO LV RWT: 0.36 CM
DELSYS: ABNORMAL
DIFFERENTIAL METHOD: ABNORMAL
DIFFERENTIAL METHOD: ABNORMAL
DOP CALC AO PEAK VEL: 1.8 M/S
DOP CALC AO VTI: 25.29 CM
DOP CALC LVOT AREA: 3.23 CM2
DOP CALC LVOT DIAMETER: 2.03 CM
DOP CALC LVOT STROKE VOLUME: 33.97 CM3
DOP CALCLVOT PEAK VEL VTI: 10.5 CM
ECHO LV POSTERIOR WALL: 0.69 CM (ref 0.6–1.1)
EOSINOPHIL # BLD AUTO: 0 K/UL
EOSINOPHIL # BLD AUTO: 0 K/UL
EOSINOPHIL NFR BLD: 0.1 %
EOSINOPHIL NFR BLD: 0.1 %
ERYTHROCYTE [DISTWIDTH] IN BLOOD BY AUTOMATED COUNT: 13 %
ERYTHROCYTE [DISTWIDTH] IN BLOOD BY AUTOMATED COUNT: 13.3 %
EST. GFR  (AFRICAN AMERICAN): >60 ML/MIN/1.73 M^2
EST. GFR  (NON AFRICAN AMERICAN): >60 ML/MIN/1.73 M^2
ESTIMATED AVG GLUCOSE: 97 MG/DL
FIBRINOGEN PPP-MCNC: 381 MG/DL
FIO2: 32
FLOW: 3
FRACTIONAL SHORTENING: 29 % (ref 28–44)
GLUCOSE SERPL-MCNC: 126 MG/DL
HBA1C MFR BLD HPLC: 5 %
HCO3 UR-SCNC: 26.8 MMOL/L (ref 24–28)
HCT VFR BLD AUTO: 31.8 %
HCT VFR BLD AUTO: 31.8 %
HDLC SERPL-MCNC: 46 MG/DL
HDLC SERPL: 31.3 %
HGB BLD-MCNC: 9.8 G/DL
HGB BLD-MCNC: 9.9 G/DL
IMM GRANULOCYTES # BLD AUTO: 0.06 K/UL
IMM GRANULOCYTES # BLD AUTO: 0.09 K/UL
IMM GRANULOCYTES NFR BLD AUTO: 0.4 %
IMM GRANULOCYTES NFR BLD AUTO: 0.7 %
INR PPP: 1
INR PPP: 1.1
INTERVENTRICULAR SEPTUM: 0.74 CM (ref 0.6–1.1)
LA MAJOR: 6.41 CM
LA MINOR: 5.93 CM
LA WIDTH: 5.09 CM
LDLC SERPL CALC-MCNC: 83.2 MG/DL
LEFT ATRIUM SIZE: 3.73 CM
LEFT ATRIUM VOLUME INDEX: 61 ML/M2
LEFT ATRIUM VOLUME: 99.42 CM3
LEFT INTERNAL DIMENSION IN SYSTOLE: 2.7 CM (ref 2.1–4)
LEFT VENTRICLE DIASTOLIC VOLUME INDEX: 59.11 ML/M2
LEFT VENTRICLE DIASTOLIC VOLUME: 96.35 ML
LEFT VENTRICLE MASS INDEX: 45.4 G/M2
LEFT VENTRICLE SYSTOLIC VOLUME INDEX: 47.5 ML/M2
LEFT VENTRICLE SYSTOLIC VOLUME: 77.49 ML
LEFT VENTRICULAR INTERNAL DIMENSION IN DIASTOLE: 3.8 CM (ref 3.5–6)
LEFT VENTRICULAR MASS: 73.97 G
LV LATERAL E/E' RATIO: 0.93
LYMPHOCYTES # BLD AUTO: 1.2 K/UL
LYMPHOCYTES # BLD AUTO: 1.4 K/UL
LYMPHOCYTES NFR BLD: 10.6 %
LYMPHOCYTES NFR BLD: 8.9 %
MAGNESIUM SERPL-MCNC: 1.8 MG/DL
MCH RBC QN AUTO: 28.6 PG
MCH RBC QN AUTO: 28.7 PG
MCHC RBC AUTO-ENTMCNC: 30.8 G/DL
MCHC RBC AUTO-ENTMCNC: 31.1 G/DL
MCV RBC AUTO: 92 FL
MCV RBC AUTO: 93 FL
MODE: ABNORMAL
MONOCYTES # BLD AUTO: 1 K/UL
MONOCYTES # BLD AUTO: 1.1 K/UL
MONOCYTES NFR BLD: 7.2 %
MONOCYTES NFR BLD: 8.1 %
MV PEAK E VEL: 1.28 M/S
NEUTROPHILS # BLD AUTO: 10.7 K/UL
NEUTROPHILS # BLD AUTO: 11.2 K/UL
NEUTROPHILS NFR BLD: 80.4 %
NEUTROPHILS NFR BLD: 82.7 %
NONHDLC SERPL-MCNC: 101 MG/DL
NRBC BLD-RTO: 0 /100 WBC
NRBC BLD-RTO: 0 /100 WBC
PCO2 BLDA: 36.2 MMHG (ref 35–45)
PH SMN: 7.48 [PH] (ref 7.35–7.45)
PHOSPHATE SERPL-MCNC: 4.1 MG/DL
PISA TR MAX VEL: 3.27 M/S
PLATELET # BLD AUTO: 469 K/UL
PLATELET # BLD AUTO: 470 K/UL
PMV BLD AUTO: 9.4 FL
PMV BLD AUTO: 9.9 FL
PO2 BLDA: 86 MMHG (ref 80–100)
POC BE: 3 MMOL/L
POC SATURATED O2: 97 % (ref 95–100)
POC TCO2: 28 MMOL/L (ref 23–27)
POCT GLUCOSE: 102 MG/DL (ref 70–110)
POCT GLUCOSE: 112 MG/DL (ref 70–110)
POCT GLUCOSE: 124 MG/DL (ref 70–110)
POCT GLUCOSE: 127 MG/DL (ref 70–110)
POCT GLUCOSE: 129 MG/DL (ref 70–110)
POTASSIUM SERPL-SCNC: 4.2 MMOL/L
PROT SERPL-MCNC: 5.5 G/DL
PROTHROMBIN TIME: 10.9 SEC
PROTHROMBIN TIME: 11 SEC
RA MAJOR: 6 CM
RA PRESSURE: 3 MMHG
RA WIDTH: 3.95 CM
RBC # BLD AUTO: 3.43 M/UL
RBC # BLD AUTO: 3.45 M/UL
RIGHT VENTRICULAR END-DIASTOLIC DIMENSION: 3.19 CM
SAMPLE: ABNORMAL
SINUS: 3.37 CM
SITE: ABNORMAL
SODIUM SERPL-SCNC: 141 MMOL/L
SP02: 100
STJ: 2.78 CM
TDI LATERAL: 1.38
TR MAX PG: 42.77 MMHG
TRICUSPID ANNULAR PLANE SYSTOLIC EXCURSION: 0.84 CM
TRIGL SERPL-MCNC: 89 MG/DL
TROPONIN I SERPL DL<=0.01 NG/ML-MCNC: >50 NG/ML
TSH SERPL DL<=0.005 MIU/L-ACNC: 0.83 UIU/ML
TV REST PULMONARY ARTERY PRESSURE: 45.77 MMHG
WBC # BLD AUTO: 13.34 K/UL
WBC # BLD AUTO: 13.54 K/UL

## 2018-11-28 PROCEDURE — 83036 HEMOGLOBIN GLYCOSYLATED A1C: CPT

## 2018-11-28 PROCEDURE — 93010 ELECTROCARDIOGRAM REPORT: CPT | Mod: ,,, | Performed by: INTERNAL MEDICINE

## 2018-11-28 PROCEDURE — 85610 PROTHROMBIN TIME: CPT

## 2018-11-28 PROCEDURE — 99233 SBSQ HOSP IP/OBS HIGH 50: CPT | Mod: ,,, | Performed by: NURSE PRACTITIONER

## 2018-11-28 PROCEDURE — 82553 CREATINE MB FRACTION: CPT

## 2018-11-28 PROCEDURE — G8996 SWALLOW CURRENT STATUS: HCPCS | Mod: CN

## 2018-11-28 PROCEDURE — 93005 ELECTROCARDIOGRAM TRACING: CPT

## 2018-11-28 PROCEDURE — 27000221 HC OXYGEN, UP TO 24 HOURS

## 2018-11-28 PROCEDURE — 83735 ASSAY OF MAGNESIUM: CPT

## 2018-11-28 PROCEDURE — 36415 COLL VENOUS BLD VENIPUNCTURE: CPT

## 2018-11-28 PROCEDURE — 82550 ASSAY OF CK (CPK): CPT | Mod: 91

## 2018-11-28 PROCEDURE — G8997 SWALLOW GOAL STATUS: HCPCS | Mod: CL

## 2018-11-28 PROCEDURE — 25000003 PHARM REV CODE 250: Performed by: NURSE PRACTITIONER

## 2018-11-28 PROCEDURE — 82553 CREATINE MB FRACTION: CPT | Mod: 91

## 2018-11-28 PROCEDURE — 92610 EVALUATE SWALLOWING FUNCTION: CPT

## 2018-11-28 PROCEDURE — 25000003 PHARM REV CODE 250: Performed by: PHYSICIAN ASSISTANT

## 2018-11-28 PROCEDURE — 80061 LIPID PANEL: CPT

## 2018-11-28 PROCEDURE — 99223 1ST HOSP IP/OBS HIGH 75: CPT | Mod: 25,,, | Performed by: INTERNAL MEDICINE

## 2018-11-28 PROCEDURE — 94761 N-INVAS EAR/PLS OXIMETRY MLT: CPT

## 2018-11-28 PROCEDURE — 85025 COMPLETE CBC W/AUTO DIFF WBC: CPT | Mod: 91

## 2018-11-28 PROCEDURE — 84484 ASSAY OF TROPONIN QUANT: CPT | Mod: 91

## 2018-11-28 PROCEDURE — 84443 ASSAY THYROID STIM HORMONE: CPT

## 2018-11-28 PROCEDURE — 85610 PROTHROMBIN TIME: CPT | Mod: 91

## 2018-11-28 PROCEDURE — 86901 BLOOD TYPING SEROLOGIC RH(D): CPT

## 2018-11-28 PROCEDURE — 84484 ASSAY OF TROPONIN QUANT: CPT

## 2018-11-28 PROCEDURE — 85384 FIBRINOGEN ACTIVITY: CPT

## 2018-11-28 PROCEDURE — 99233 SBSQ HOSP IP/OBS HIGH 50: CPT | Mod: GC,,, | Performed by: PSYCHIATRY & NEUROLOGY

## 2018-11-28 PROCEDURE — 51798 US URINE CAPACITY MEASURE: CPT

## 2018-11-28 PROCEDURE — G8987 SELF CARE CURRENT STATUS: HCPCS | Mod: CN

## 2018-11-28 PROCEDURE — 51701 INSERT BLADDER CATHETER: CPT

## 2018-11-28 PROCEDURE — 85730 THROMBOPLASTIN TIME PARTIAL: CPT

## 2018-11-28 PROCEDURE — G8988 SELF CARE GOAL STATUS: HCPCS | Mod: CM

## 2018-11-28 PROCEDURE — 82550 ASSAY OF CK (CPK): CPT

## 2018-11-28 PROCEDURE — A4216 STERILE WATER/SALINE, 10 ML: HCPCS | Performed by: NURSE PRACTITIONER

## 2018-11-28 PROCEDURE — 80053 COMPREHEN METABOLIC PANEL: CPT

## 2018-11-28 PROCEDURE — 82803 BLOOD GASES ANY COMBINATION: CPT

## 2018-11-28 PROCEDURE — 92523 SPEECH SOUND LANG COMPREHEN: CPT

## 2018-11-28 PROCEDURE — 99900035 HC TECH TIME PER 15 MIN (STAT)

## 2018-11-28 PROCEDURE — 97166 OT EVAL MOD COMPLEX 45 MIN: CPT

## 2018-11-28 PROCEDURE — 84100 ASSAY OF PHOSPHORUS: CPT

## 2018-11-28 PROCEDURE — 36600 WITHDRAWAL OF ARTERIAL BLOOD: CPT

## 2018-11-28 PROCEDURE — 20000000 HC ICU ROOM

## 2018-11-28 PROCEDURE — 63600175 PHARM REV CODE 636 W HCPCS: Performed by: NURSE PRACTITIONER

## 2018-11-28 RX ORDER — MAGNESIUM SULFATE HEPTAHYDRATE 40 MG/ML
2 INJECTION, SOLUTION INTRAVENOUS ONCE
Status: COMPLETED | OUTPATIENT
Start: 2018-11-28 | End: 2018-11-28

## 2018-11-28 RX ORDER — METOPROLOL TARTRATE 1 MG/ML
5 INJECTION, SOLUTION INTRAVENOUS EVERY 8 HOURS
Status: DISCONTINUED | OUTPATIENT
Start: 2018-11-28 | End: 2018-11-28

## 2018-11-28 RX ORDER — METOPROLOL TARTRATE 1 MG/ML
2.5 INJECTION, SOLUTION INTRAVENOUS EVERY 8 HOURS
Status: DISCONTINUED | OUTPATIENT
Start: 2018-11-28 | End: 2018-11-28

## 2018-11-28 RX ORDER — HEPARIN SODIUM,PORCINE/D5W 25000/250
12 INTRAVENOUS SOLUTION INTRAVENOUS CONTINUOUS
Status: DISCONTINUED | OUTPATIENT
Start: 2018-11-28 | End: 2018-11-28

## 2018-11-28 RX ORDER — METOPROLOL TARTRATE 1 MG/ML
2.5 INJECTION, SOLUTION INTRAVENOUS ONCE
Status: COMPLETED | OUTPATIENT
Start: 2018-11-28 | End: 2018-11-28

## 2018-11-28 RX ORDER — METOPROLOL TARTRATE 25 MG/1
25 TABLET, FILM COATED ORAL 3 TIMES DAILY
Status: DISCONTINUED | OUTPATIENT
Start: 2018-11-28 | End: 2018-11-29

## 2018-11-28 RX ORDER — AMOXICILLIN 250 MG
2 CAPSULE ORAL 2 TIMES DAILY
Status: DISCONTINUED | OUTPATIENT
Start: 2018-11-28 | End: 2018-11-30

## 2018-11-28 RX ORDER — HEPARIN SODIUM,PORCINE/D5W 25000/250
14 INTRAVENOUS SOLUTION INTRAVENOUS CONTINUOUS
Status: DISCONTINUED | OUTPATIENT
Start: 2018-11-28 | End: 2018-12-04

## 2018-11-28 RX ORDER — ATORVASTATIN CALCIUM 20 MG/1
40 TABLET, FILM COATED ORAL DAILY
Status: DISCONTINUED | OUTPATIENT
Start: 2018-11-28 | End: 2018-12-03

## 2018-11-28 RX ADMIN — HEPARIN SODIUM AND DEXTROSE 12 UNITS/KG/HR: 10000; 5 INJECTION INTRAVENOUS at 04:11

## 2018-11-28 RX ADMIN — Medication 3 ML: at 01:11

## 2018-11-28 RX ADMIN — Medication 3 ML: at 11:11

## 2018-11-28 RX ADMIN — Medication 3 ML: at 05:11

## 2018-11-28 RX ADMIN — STANDARDIZED SENNA CONCENTRATE AND DOCUSATE SODIUM 2 TABLET: 8.6; 5 TABLET, FILM COATED ORAL at 08:11

## 2018-11-28 RX ADMIN — METOPROLOL TARTRATE 2.5 MG: 1 INJECTION, SOLUTION INTRAVENOUS at 09:11

## 2018-11-28 RX ADMIN — METOPROLOL TARTRATE 25 MG: 25 TABLET ORAL at 08:11

## 2018-11-28 RX ADMIN — METOPROLOL TARTRATE 2.5 MG: 1 INJECTION, SOLUTION INTRAVENOUS at 02:11

## 2018-11-28 RX ADMIN — MAGNESIUM SULFATE IN WATER 2 G: 40 INJECTION, SOLUTION INTRAVENOUS at 10:11

## 2018-11-28 RX ADMIN — MAGNESIUM SULFATE IN WATER 2 G: 40 INJECTION, SOLUTION INTRAVENOUS at 02:11

## 2018-11-28 NOTE — SUBJECTIVE & OBJECTIVE
Past Medical History:   Diagnosis Date    Depression     Hypertension     Neuropathy      Past Surgical History:   Procedure Laterality Date    APPENDECTOMY      ARTHROPLASTY-HIP-NOLBERTO Right 11/10/2018    Performed by Nasir Danielle MD at [facility] OR    GALLBLADDER SURGERY      HEMIARTHROPLASTY OF HIP Right 11/10/2018    Procedure: ARTHROPLASTY-HIP-NOLBERTO;  Surgeon: Nasir Danielle MD;  Location: [facility] OR;  Service: Orthopedics;  Laterality: Right;  Depuy Bipolar  LAteral positioner with positioners    HYSTERECTOMY      TONSILLECTOMY        Current Facility-Administered Medications on File Prior to Encounter   Medication Dose Route Frequency Provider Last Rate Last Dose    [COMPLETED] alteplase (ACTIVASE) 100 mg injection 48 mg  0.81 mg/kg Intravenous ED 1 Time Benson Adams MD 48 mL/hr at 11/27/18 1542 48 mg at 11/27/18 1542    [COMPLETED] alteplase (ACTIVASE) 100 mg injection 5 mg  0.09 mg/kg Intravenous Once Benson Adams MD   Stopped at 11/27/18 1540     Current Outpatient Medications on File Prior to Encounter   Medication Sig Dispense Refill    amitriptyline (ELAVIL) 10 MG tablet Take 1 tablet (10 mg total) by mouth 2 (two) times daily. 180 tablet 3    calcium carb/vit D3/minerals (CALCIUM CARBONATE-VIT D3-MIN) 600 mg (1,500 mg)-400 unit Chew Take 1 tablet by mouth once daily. 30 each 3    cyanocobalamin (VITAMIN B-12) 250 MCG tablet Take 250 mcg by mouth once daily.      ergocalciferol (VITAMIN D2) 50,000 unit Cap Take 50,000 Units by mouth every 7 days.      gabapentin (NEURONTIN) 300 MG capsule Take 1 capsule (300 mg total) by mouth every evening. 30 capsule 11    guaifenesin-codeine 100-10 mg/5 ml (TUSSI-ORGANIDIN NR)  mg/5 mL syrup Take 10 mLs by mouth every 4 (four) hours as needed for Cough. 240 mL 0    HYDROcodone-acetaminophen (NORCO)  mg per tablet Take 1 tablet by mouth every 6 (six) hours as needed for Pain. 8 tablet 0    verapamil (CALAN-SR) 240 MG CR tablet  Take 1 tablet (240 mg total) by mouth once daily. 90 tablet 3    vitamin A 8000 UNIT capsule Take 8,000 Units by mouth once daily.        Allergies: Celebrex [celecoxib]; Cymbalta [duloxetine]; Iodine and iodide containing products; Lovastatin; Pneumococcal 23-gregorio ps vaccine; Sulpho-lac [sulfur]; Vioxx [rofecoxib]; and Savella [milnacipran]    History reviewed. No pertinent family history.  Social History     Tobacco Use    Smoking status: Never Smoker    Smokeless tobacco: Never Used   Substance Use Topics    Alcohol use: No    Drug use: No     Review of Systems   Unable to obtain due to aphasia  Objective:     Vitals:    Temp: 98.5 °F (36.9 °C)  Pulse: 110  BP: 132/83  MAP (mmHg): 101  Resp: (!) 26  SpO2: 99 %  O2 Device (Oxygen Therapy): nasal cannula    Temp  Min: 98.2 °F (36.8 °C)  Max: 98.5 °F (36.9 °C)  Pulse  Min: 68  Max: 119  BP  Min: 123/75  Max: 159/70  MAP (mmHg)  Min: 97  Max: 101  Resp  Min: 12  Max: 40  SpO2  Min: 94 %  Max: 100 %    No intake/output data recorded.           Physical Exam  GA: Alert, comfortable, no acute distress.   HEENT: No scleral icterus or JVD.   Pulmonary: Clear to auscultation A/L. No wheezing, crackles, or rhonchi.  Cardiac: RRR S1 & S2 w/o rubs/murmurs/gallops.   Abdominal: Bowel sounds present x 4. No appreciable hepatosplenomegaly.  Skin: No jaundice, rashes, or visible lesions.  Neuro:  --GCS: E4 V1 M5  --Mental Status:  AA, not following commands, aphasic, moves left spontaneously, pain on right lower, and no movement on the RUE.  --CN II-XII grossly intact.   --Pupils 2mm, PERRL.   --Corneal reflex, gag, cough intact.  --LUE strength: 4/5  --LLE strength: 5/5  --RUE strength: 0/5  --RLE strength: 2/5      Today I personally reviewed pertinent medications, lines/drains/airways, imaging, cardiology results, laboratory results, microbiology results, notably:

## 2018-11-28 NOTE — PROGRESS NOTES
Type of Device used: Penumbra  Groin Time: 1754  First Pass time: 1825  Site of Occlusion: Carotid Terminus  TICI Score: 2C

## 2018-11-28 NOTE — ASSESSMENT & PLAN NOTE
Apple Watson is a 89 y.o. female with PMHx of HTN and new onset a fib who presented to OSH with L MCA syndrome. She was treated with tPA and [place] [facility] CTA with L ICA occlusion, patient taken to IR for possible intervention.     Recommendations:  -Rate control afib with metoprolol  -Begin ASA 24h after tPA, consider DAPT prior to d/c  -Atrov 40 mg dialy  -SBP goal <180  -Aggressive PT/OT/SLP  -MRI stat to assess for hemorrhagic conversion (may need anticoagulation regardless, will discuss with cardiology)  -See afib note for anticoagulation/further recs         Antithrombotics for secondary stroke prevention: Antiplatelets: None: Hold all Antithrombotics x 24 hours after IV t-PA administration    Statins for secondary stroke prevention and hyperlipidemia, if present:   Statins: Atorvastatin- 40 mg daily    Aggressive risk factor modification: HTN, A-Fib     Rehab efforts: Occupational Therapy, PT/OT/SLP to evaluate and treat, PM&R consult     Diagnostics ordered/pending: HgbA1C to assess blood glucose levels, Lipid Profile to assess cholesterol levels, MRI head without contrast to assess brain parenchyma, TTE to assess cardiac function/status , TSH to assess thyroid function    VTE prophylaxis: None: Reason for No Pharmacological VTE Prophylaxis: Mechanical prophylaxis: Place SCDs    BP parameters: Infarct: Post tPA, SBP <180

## 2018-11-28 NOTE — PLAN OF CARE
Problem: Patient Care Overview  Goal: Plan of Care Review  Outcome: Ongoing (interventions implemented as appropriate)  POC reviewed with Apple and daughter Negra at 0500. Pt is aphasic and cannot verbalized understanding; daughter verbalized understanding. Questions and concerns addressed. Recurrent Vtach noted within the shift. Pt progressing toward goals. Family contemplating on pt's code status; will decide today per pt's prior request to be place on DNR. Cardiologist came to bedside to discuss pt's cardiac status. Procedure site dressing dry and intact. Improvement in neuro status noted; pt moves all extremities but still aphasic and doesn't follow commands. Will continue to monitor. See flowsheets for full assessment and VS info

## 2018-11-28 NOTE — SEDATION DOCUMENTATION
Thrombectomy complete. Procedure site (right groin) dressing CDI. Pt tolerated well. Pt to transfer via bed to 9086.  Hemostasis @ 1905  HOB to remain flat until 2105.

## 2018-11-28 NOTE — ASSESSMENT & PLAN NOTE
-- Continue Neuro checks q 1hr  -- Vascular Neurology consulted  -- CTA multiphase pending  -- SBP goal <140  -- PT/OT/Speech  -- CXR  -- Electrolyte replace prn  -- Tici 2C reperfusion

## 2018-11-28 NOTE — ASSESSMENT & PLAN NOTE
- Elevated troponin more likely to be sequalae of acute coronary syndrome with markedly elevated tropnins, no real EKG changes and unable to communicate chest pain, intrestingly had what seems to be supply/demand ischemia 2 weeks ago.      - Would recommend medical management as family does not want nay invasive interventions which is reasoable given mental status, frailty and age - ASA, Plavix 300mg load then 75mg qday and Heparin ggt for 48hrs, all these can be started when ok by Neurocritical care.  - Would recommend adding on 25 bid metoprolol tartrate.

## 2018-11-28 NOTE — PROGRESS NOTES
Pt remains under sterile field for IR procedure. Unable to complete neuro assessment. VSS. Will continue to monitor.

## 2018-11-28 NOTE — ASSESSMENT & PLAN NOTE
Stroke risk factor  First noted on EKG post hip replacement surgery, 11/11/18  No anticoagulation initiated  Rate treated with metoprolol  Patient returned to normal sinus rhythm spontaneously  A fib noted on monitor on arrival in ED, EKG obtained to confirm  Will need to discuss secondary stroke prevention with patient and family

## 2018-11-28 NOTE — PROGRESS NOTES
Pt remains under sterile field for IR procedure. Unable to complete 1900 neuro assessment. VSS. Will continue to monitor.

## 2018-11-28 NOTE — HOSPITAL COURSE
11/27: Admit to [facility], TPA, Thrombectomy  11/28: Cards consult for trops>50, Mri stable, possible LHC, Heparin drip .3-.5,   11/29: Awaiting cards decision to do LHC, CTH in am, CXR  12/01: Transfer to Stroke service

## 2018-11-28 NOTE — ASSESSMENT & PLAN NOTE
Elevated troponin likely to be sequelae of acute coronary syndrome (unclear if the result of embolic phenomena from atrial thrombus in the setting of her acute CVA and active atrial fib) with markedly elevated tropnins, transient EKG with brief ST elevations, and unable to communicate chest pain, in the setting of possible supply/demand ischemia 2 weeks ago.      - Patient would benefit from therapy for ACS with ASA, Plavix 75mg qday and Heparin gtt for 48hrs, when cleared by neuro-teams  - today will be the first day of lopressor 25 TID dosing. Will monitor response to TID dosing.  - continue anticoagulation as outpatient for high risk AF (HQWAD6S = 5) with high likelihood of atrial thrombus  - based off of echo, likely had embolus of clot to distal LAD and the stroke at the same time.  - would not recommend ischemic workup in the acute setting. Follow up with cardiology in the outpatient setting and determine need for evaluation at that time.

## 2018-11-28 NOTE — PROGRESS NOTES
Pt in VTACH. Converted to Atrial fibrillation. [facility] Nurse awaiting interventions.

## 2018-11-28 NOTE — PROGRESS NOTES
Cardiology paged per family request. Pt family expecting direct consult from MD. Nurse notified MD of expectations. MD to report to bedside post rounding per verbal.

## 2018-11-28 NOTE — SUBJECTIVE & OBJECTIVE
Past Medical History:   Diagnosis Date    Depression     Hypertension     Neuropathy        Past Surgical History:   Procedure Laterality Date    APPENDECTOMY      ARTHROPLASTY-HIP-NOLBERTO Right 11/10/2018    Performed by Nasir Danielle MD at [facility] OR    GALLBLADDER SURGERY      HEMIARTHROPLASTY OF HIP Right 11/10/2018    Procedure: ARTHROPLASTY-HIP-NOLBERTO;  Surgeon: Nasir Danielle MD;  Location: [facility] OR;  Service: Orthopedics;  Laterality: Right;  Depuy Bipolar  LAteral positioner with positioners    HYSTERECTOMY      TONSILLECTOMY         Review of patient's allergies indicates:   Allergen Reactions    Celebrex [celecoxib] Other (See Comments)     Blood in bowel    Cymbalta [duloxetine]     Iodine and iodide containing products     Lovastatin     Pneumococcal 23-gregorio ps vaccine     Sulpho-lac [sulfur]     Vioxx [rofecoxib]     Savella [milnacipran] Rash       Current Facility-Administered Medications on File Prior to Encounter   Medication    [COMPLETED] alteplase (ACTIVASE) 100 mg injection 48 mg    [COMPLETED] alteplase (ACTIVASE) 100 mg injection 5 mg     Current Outpatient Medications on File Prior to Encounter   Medication Sig    amitriptyline (ELAVIL) 10 MG tablet Take 1 tablet (10 mg total) by mouth 2 (two) times daily.    calcium carb/vit D3/minerals (CALCIUM CARBONATE-VIT D3-MIN) 600 mg (1,500 mg)-400 unit Chew Take 1 tablet by mouth once daily.    cyanocobalamin (VITAMIN B-12) 250 MCG tablet Take 250 mcg by mouth once daily.    ergocalciferol (VITAMIN D2) 50,000 unit Cap Take 50,000 Units by mouth every 7 days.    gabapentin (NEURONTIN) 300 MG capsule Take 1 capsule (300 mg total) by mouth every evening.    guaifenesin-codeine 100-10 mg/5 ml (TUSSI-ORGANIDIN NR)  mg/5 mL syrup Take 10 mLs by mouth every 4 (four) hours as needed for Cough.    HYDROcodone-acetaminophen (NORCO)  mg per tablet Take 1 tablet by mouth every 6 (six) hours as needed for Pain.     verapamil (CALAN-SR) 240 MG CR tablet Take 1 tablet (240 mg total) by mouth once daily.    vitamin A 8000 UNIT capsule Take 8,000 Units by mouth once daily.     Family History     None        Tobacco Use    Smoking status: Never Smoker    Smokeless tobacco: Never Used   Substance and Sexual Activity    Alcohol use: No    Drug use: No    Sexual activity: Not on file     Review of Systems   Unable to perform ROS: patient nonverbal     Objective:     Vital Signs (Most Recent):  Temp: 97.9 °F (36.6 °C) (11/28/18 0305)  Pulse: (!) 111 (11/28/18 0600)  Resp: (!) 25 (11/28/18 0600)  BP: 126/74 (11/28/18 0600)  SpO2: 100 % (11/28/18 0600) Vital Signs (24h Range):  Temp:  [97.9 °F (36.6 °C)-98.5 °F (36.9 °C)] 97.9 °F (36.6 °C)  Pulse:  [] 111  Resp:  [12-40] 25  SpO2:  [94 %-100 %] 100 %  BP: (112-159)/(58-89) 126/74     Weight: 63.7 kg (140 lb 6.9 oz)  Body mass index is 28.36 kg/m².    SpO2: 100 %  O2 Device (Oxygen Therapy): nasal cannula      Intake/Output Summary (Last 24 hours) at 11/28/2018 0702  Last data filed at 11/28/2018 0605  Gross per 24 hour   Intake 904.38 ml   Output 550 ml   Net 354.38 ml       Lines/Drains/Airways     Drain            Female External Urinary Catheter 11/27/18 2100 less than 1 day          Peripheral Intravenous Line                 Peripheral IV - Single Lumen 11/27/18 1509 Left Forearm less than 1 day         Peripheral IV - Single Lumen 11/27/18 1520 Right Forearm less than 1 day                Physical Exam   Constitutional: She is oriented to person, place, and time. She appears well-developed and well-nourished. No distress.   Eyes: Conjunctivae are normal. Pupils are equal, round, and reactive to light.   Neck: No tracheal deviation present. No thyromegaly present.   Cardiovascular: Normal rate and intact distal pulses. An irregularly irregular rhythm present. Exam reveals no gallop and no friction rub.   Murmur heard.  Systolic ejection murmur RUSB 2/6   Pulmonary/Chest:  Effort normal and breath sounds normal. No respiratory distress. She has no wheezes. She has no rales.   Abdominal: Soft. Bowel sounds are normal. She exhibits no distension. There is no tenderness.   Musculoskeletal: She exhibits no edema or deformity.   Neurological: She is alert and oriented to person, place, and time. No cranial nerve deficit. Coordination normal.   Skin: Skin is warm and dry. She is not diaphoretic.   Psychiatric: She has a normal mood and affect. Her behavior is normal.       Significant Labs:   BMP:   Recent Labs   Lab 11/27/18  1509 11/28/18  0100   * 126*    141   K 3.9 4.2    106   CO2 29 25   BUN 26* 25*   CREATININE 0.69 0.7   CALCIUM 8.5* 8.4*   MG  --  1.8   , CMP   Recent Labs   Lab 11/27/18  1509 11/28/18  0100    141   K 3.9 4.2    106   CO2 29 25   * 126*   BUN 26* 25*   CREATININE 0.69 0.7   CALCIUM 8.5* 8.4*   PROT 6.6 5.5*   ALBUMIN 3.4* 2.6*   BILITOT 0.6 0.6   ALKPHOS 133* 164*   AST 28 152*   ALT 12 31   ANIONGAP 7* 10   ESTGFRAFRICA >60.0 >60.0   EGFRNONAA >60.0 >60.0   , CBC   Recent Labs   Lab 11/27/18  1509 11/28/18  0100   WBC 9.19 13.54*   HGB 11.0* 9.9*   HCT 36.6* 31.8*   * 469*   , Lipid Panel   Recent Labs   Lab 11/28/18  0100   CHOL 147   HDL 46   LDLCALC 83.2   TRIG 89   CHOLHDL 31.3    and Troponin   Recent Labs   Lab 11/27/18  1509 11/28/18  0100 11/28/18  0345   TROPONINI <0.012 >50.000* >50.000*       Significant Imaging: Echocardiogram: 2D echo with color flow doppler: No results found for this or any previous visit.

## 2018-11-28 NOTE — HPI
Briefly, Ms Watson is an 89 F with PMH of HTN, New onset Afib 2 weeks ago (CHADSVASC 6), Recent hip fx s/p athroplasty who p/w L MCA stroke s/p carotid thrombectomy at [facility] and tPA at OSH (Time of tPA 4pm 11/27). She initially had a negative troponin at the OSH <0.012 but while here post procedure had a short run of NSVT but incapable of complaining of CP due to aphasia from stroke. Troponin was checked and found  To be >50 x 2. No real EKG changes, non spectific ST T wave changes in ant leads. Bedside TTE: EF seems preserved low normal 50-55%, no obvious akinesis or hypokinesis, good RV function, severely enlarged LA, eccentric MR jet. Of note per daughters' report, no c/o chest pain or SOB prior to stroke. Never smoker.  Cardiology consulted for elevated troponin.

## 2018-11-28 NOTE — PLAN OF CARE
[facility] SARAH Torres - [address] [facility]  [address] [place] [address]  Phone: [phone] Fax: [phone]    This Cm spoke with patient son at bedside was informed patient receives PT/OT thru [facility].       11/28/18 1510   Discharge Assessment   Assessment Type Discharge Planning Assessment   Confirmed/corrected address and phone number on facesheet? Yes   Assessment information obtained from? Patient;Caregiver   Expected Length of Stay (days) 2   Communicated expected length of stay with patient/caregiver no   Prior to hospitilization cognitive status: Alert/Oriented   Prior to hospitalization functional status: Assistive Equipment   Current cognitive status: Alert/Oriented   Current Functional Status: Assistive Equipment   Lives With child(juan), adult   Able to Return to Prior Arrangements yes   Is patient able to care for self after discharge? Yes   Who are your caregiver(s) and their phone number(s)? (daughter Liana Seth [phone])   Patient's perception of discharge disposition home or selfcare   Readmission Within The Last 30 Days current reason for admission unrelated to previous admission   Patient currently being followed by outpatient case management? No   Patient currently receives any other outside agency services? No   Equipment Currently Used at Home walker, rolling;commode;wheelchair   Do you have any problems affording any of your prescribed medications? No   Is the patient taking medications as prescribed? yes   Does the patient have transportation home? Yes   Transportation Available family or friend will provide   Does the patient receive services at the [facility]mad[facility] Clinic? No   Discharge Plan A Skilled Nursing Facility   Discharge Plan B Other   Patient/Family In Agreement With Plan yes   Readmission Questionnaire   At the time of your discharge, did someone talk to you about what your health problems were? Yes   At the time of discharge, did someone talk  to you about what to watch out for regarding worsening of your health problem? Yes   At the time of discharge, did someone talk to you about what to do if you experienced worsening of your health problem? Yes   At the time of discharge, did someone talk to you about which medication to take when you left the hospital and which ones to stop taking? Yes   At the time of discharge, did someone talk to you about when and where to follow up with a doctor after you left the hospital? Yes   What do you believe caused you to be sick enough to be re-admitted? (weakness)   How often do you need to have someone help you when you read instructions, pamphlets, or other written material from your doctor or pharmacy? Always   Do you have problems taking your medications as prescribed? Yes   Do you have any problems affording any of  your prescribed medications? No   Do you have problems obtaining/receiving your medications? No   Does the patient have transportation to healthcare appointments? No   Living Arrangements house   Does the patient have family/friends to help with healtcare needs after discharge? yes   Does your caregiver provide all the help you need? I don't know   Have you felt down, depressed, or hopeless? Unable to Assess   Have you felt little interest or pleasure in doing things? Unable to assess     Luna Freitas RN/BSN/CM  [phone]  [facility]

## 2018-11-28 NOTE — PLAN OF CARE
MRI brain reviewed with left MCA territory infarct < 30 % of the infarct. No hemorrhagic transformation @ 24 hours post tPA.     Cards rec PCI and possible anticoagluation for NSTEMI with Troponin > 50.     Benefit of anticoagulation outweighs the risk of hemorrhagic transformation considering the NSTEMI.    Pt is cleared from Vascular Neurology Standpoint to receive anticoagulation for treatment of NSTEMI.        Royal Powers MD  Vascular Neurology Fellow.

## 2018-11-28 NOTE — PROGRESS NOTES
[facility]  Vascular Neurology  Comprehensive Stroke Center  Progress Note    Assessment/Plan:     * Embolic stroke involving left carotid artery    Apple Watson is a 89 y.o. female with PMHx of HTN and new onset a fib who presented to OSH with L MCA syndrome. She was treated with tPA and transferred to [facility]. CTA with L ICA occlusion, patient taken to IR for possible intervention.     Recommendations: Please allow staff to sign prior to using these recs.  -Rate control afib with metoprolol  -Begin ASA 24h after tPA, consider DAPT prior to d/c   (unless AC started then just start ASA 81 mg alone. IF patient gets stents likely DAPT and AC, refer to cardiology at that point)  -Atrov 40 mg dialy  -SBP goal <180  -Aggressive PT/OT/SLP  -MRI stat to assess for hemorrhagic conversion   (likely will need anticoagulation regardless, will discuss with cardiology ?PCI)  -See afib note for anticoagulation/further recs         Antithrombotics for secondary stroke prevention: Antiplatelets: None: Hold all Antithrombotics x 24 hours after IV t-PA administration    Statins for secondary stroke prevention and hyperlipidemia, if present:   Statins: Atorvastatin- 40 mg daily    Aggressive risk factor modification: HTN, A-Fib     Rehab efforts: Occupational Therapy, PT/OT/SLP to evaluate and treat, PM&R consult     Diagnostics ordered/pending: HgbA1C to assess blood glucose levels, Lipid Profile to assess cholesterol levels, MRI head without contrast to assess brain parenchyma, TTE to assess cardiac function/status , TSH to assess thyroid function    VTE prophylaxis: None: Reason for No Pharmacological VTE Prophylaxis: Mechanical prophylaxis: Place SCDs    BP parameters: Infarct: Post tPA, SBP <180         Atrial fibrillation with RVR    First noted on EKG post hip replacement surgery, 11/11/18, No anticoagulation was initiated at that time.   A fib noted on monitor on arrival in ED, EKG obtained confirmed afib  RVR  Will need to discuss secondary stroke prevention with patient and family    Atrial Fibrillation Assessment ([id number]):       Condition Points    C   Congestive heart failure (or Left ventricular systolic dysfunction) 0    H Hypertension: blood pressure consistently above 140/90 mmHg (or treated hypertension on medication) 1    A2  Age ?75 years 2    D  Diabetes Mellitus 0    S2  Prior Stroke or TIA or Thromboembolism  2    V  Vascular disease (e.g. peripheral artery disease, myocardial infarction, aortic plaque) ?    A  Age 65-74 years 0    Sc  Sex category (i.e. female sex) 1                                                                                  [id number] Score: 6  [id number] Score Stroke Risk %   6 9.8                                                                                  Annual Stroke Risk:  9.8%    Score Risk Anticoagulation Therapy Considerations   2 or greater High Oral anticoagulant Oral anticoagulant, using either a new oral anticoagulant drug   (apixaban, rivaroxaban or dabigatran) or well controlled Warfarin at INR 2.0-3.0             Recommendation:     -Begin  (24h after tPA), consider DAPT prior to discharge   -If started on warfarin, would likely need heparin bridge. And will only need ASA 81 mg. If patient receives stents, likely will need DAPT and AC.   -[id number] score of 6= 9.8% annual risk. Would begin patient on AC prior to discharge, if no other contraindication. Cardiology following.    -Rate control with Metoprolol tartrate while inpatient and transition to 24h metoprolol succinate prior to d/c.               Received intravenous tissue plasminogen activator (tPA) in emergency department    Admitting to neuro ICU for post tPA monitoring     Atrial fibrillation    Stroke risk factor  First noted on EKG post hip replacement surgery, 11/11/18  No anticoagulation initiated  Rate treated with metoprolol  Patient returned to normal sinus rhythm  spontaneously  A fib noted on monitor on arrival in ED, EKG obtained to confirm  Will need to discuss secondary stroke prevention with patient and family      NSTEMI (non-ST elevated myocardial infarction)    Trop >50  NSTEMI  -Cardiology assisting      Closed fracture of neck of right femur    Recent surgery on 11/10/18     Essential hypertension    Stroke risk factor  SBP <180 post tPA  Management per neuro ICU  BP currently at goal          No notes on file    STROKE DOCUMENTATION   Acute Stroke Times   Last Known Normal Date: 11/27/18  Last Known Normal Time: 1300  Symptom Onset Date: 11/27/18  Symptom Onset Time: 1330  Stroke Team Called Date: 11/27/18  Stroke Team Called Time: 1649  Stroke Team Arrival Date: 11/27/18  Stroke Team Arrival Time: 1650  CT Interpretation Time: 1718  Decision to Treat Time for Alteplase: (administed at OSH via telemedicine)  Decision to Treat Time for IR: 1732    NIH Scale:  1a. Level Of Consciousness: 0-->Alert: keenly responsive  1b. LOC Questions: 2-->Answers neither question correctly  1c. LOC Commands: 2-->Performs neither task correctly  2. Best Gaze: 1-->Partial gaze palsy: gaze is abnormal in one or both eyes, but forced deviation or total gaze paresis is not present  3. Visual: 0-->No visual loss  4. Facial Palsy: 2-->Partial paralysis (total or near-total paralysis of lower face)  5a. Motor Arm, Left: 1-->Drift: limb holds 90 (or 45) degrees, but drifts down before full 10 seconds: does not hit bed or other support  5b. Motor Arm, Right: 3-->No effort against gravity: limb falls  6a. Motor Leg, Left: 3-->No effort against gravity: leg falls to bed immediately  6b. Motor Leg, Right: 4-->No movement  7. Limb Ataxia: 2-->Present in two limbs  8. Sensory: 0-->Normal: no sensory loss  9. Best Language: 3-->Mute, global aphasia: no usable speech or auditory comprehension  10. Dysarthria: 2-->Severe dysarthria: patients speech is so slurred as to be unintelligible in the absence  of or out of proportion to any dysphasia, or is mute/anarthric  11. Extinction and Inattention (formerly Neglect): 1-->Visual, tactile, auditory, spatial, or personal inattention or extinction to bilateral simultaneous stimulation in one of the sensory modalities  Total (NIH Stroke Scale): 26       Modified Cuco Score: 3  Mor Coma Scale:10   ABCD2 Score:    [id number] Score:   HAS -BLED Score:   ICH Score:   Hunt & Tapia Classification:      Hemorrhagic change of an Ischemic Stroke: Does this patient have an ischemic stroke with hemorrhagic changes? Will assess with MRI    Neurologic Chief Complaint: L ICA terminus occlusion  RSW, RFD, L-gaze, Mute    Subjective:     Interval History: Patient is seen for follow-up neurological assessment and treatment recommendations: Initiate AC and rate control with metoprolol     HPI, Past Medical, Family, and Social History remains the same as documented in the initial encounter.     Review of Systems  Scheduled Meds:   atorvastatin  40 mg Oral Daily    metoprolol  2.5 mg Intravenous Q8H    sodium chloride 0.9%  3 mL Intravenous Q8H     Continuous Infusions:   sodium chloride 0.9% 75 mL/hr at 11/28/18 0605    niCARdipine 2.5 mg/hr (11/28/18 0605)     PRN Meds:acetaminophen, calcium gluconate IVPB, calcium gluconate IVPB, calcium gluconate IVPB, magnesium sulfate IVPB, magnesium sulfate IVPB, ondansetron, sodium chloride 0.9%    Objective:     Vital Signs (Most Recent):  Temp: 98.1 °F (36.7 °C) (11/28/18 0701)  Pulse: 110 (11/28/18 0800)  Resp: (!) 23 (11/28/18 0800)  BP: (!) 112/58 (11/28/18 0800)  SpO2: 100 % (11/28/18 0800)  BP Location: Right arm    Vital Signs Range (Last 24H):  Temp:  [97.9 °F (36.6 °C)-98.5 °F (36.9 °C)]   Pulse:  []   Resp:  [12-40]   BP: (112-159)/(58-89)   SpO2:  [94 %-100 %]   BP Location: Right arm    Physical Exam    Neurological Exam:   Neurological Exam:   LOC: alert  Attention Span: Good   Language: mute   Articulation:  mute  Orientation: Not oriented to time  Visual Fields: left gaze  EOM (CN III, IV, VI): Full/intact  Pupils (CN II, III): PERRL  Facial Sensation (CN V): Normal  Facial Movement (CN VII): Symmetric facial expression    Motor: Arm left  Normal  4/5  Leg left  Normal  0/5  Arm right  Normal  1/5  Leg right Normal  1/5  Sensation: unable  Tone: Normal tone throughout  Reflexes +2 throughout               Laboratory:    Recent Results (from the past 24 hour(s))   CBC W/ AUTO DIFFERENTIAL    Collection Time: 11/27/18  3:09 PM   Result Value Ref Range    WBC 9.19 3.90 - 12.70 K/uL    RBC 3.84 (L) 4.00 - 5.40 M/uL    Hemoglobin 11.0 (L) 12.0 - 16.0 g/dL    Hematocrit 36.6 (L) 37.0 - 48.5 %    MCV 95 82 - 98 fL    MCH 28.6 27.0 - 31.0 pg    MCHC 30.1 (L) 32.0 - 36.0 g/dL    RDW 13.4 11.5 - 14.5 %    Platelets 437 (H) 150 - 350 K/uL    MPV 9.5 9.2 - 12.9 fL    Gran # (ANC) 6.3 1.8 - 7.7 K/uL    Lymph # 1.5 1.0 - 4.8 K/uL    Mono # 1.1 (H) 0.3 - 1.0 K/uL    Eos # 0.2 0.0 - 0.5 K/uL    Baso # 0.07 0.00 - 0.20 K/uL    Gran% 68.8 38.0 - 73.0 %    Lymph% 16.8 (L) 18.0 - 48.0 %    Mono% 11.4 4.0 - 15.0 %    Eosinophil% 1.8 0.0 - 8.0 %    Basophil% 0.8 0.0 - 1.9 %    Differential Method Automated    Comprehensive metabolic panel    Collection Time: 11/27/18  3:09 PM   Result Value Ref Range    Sodium 137 136 - 145 mmol/L    Potassium 3.9 3.5 - 5.1 mmol/L    Chloride 101 95 - 110 mmol/L    CO2 29 23 - 29 mmol/L    Glucose 127 (H) 70 - 110 mg/dL    BUN, Bld 26 (H) 7 - 17 mg/dL    Creatinine 0.69 0.50 - 1.40 mg/dL    Calcium 8.5 (L) 8.7 - 10.5 mg/dL    Total Protein 6.6 6.0 - 8.4 g/dL    Albumin 3.4 (L) 3.5 - 5.2 g/dL    Total Bilirubin 0.6 0.1 - 1.0 mg/dL    Alkaline Phosphatase 133 (H) 38 - 126 U/L    AST 28 15 - 46 U/L    ALT 12 10 - 44 U/L    Anion Gap 7 (L) 8 - 16 mmol/L    eGFR if African American >60.0 >60 mL/min/1.73 m^2    eGFR if non African American >60.0 >60 mL/min/1.73 m^2   Protime-INR    Collection Time: 11/27/18  3:09  PM   Result Value Ref Range    Prothrombin Time 13.2 (H) 9.0 - 12.5 sec    INR 1.2 0.8 - 1.2   TSH    Collection Time: 11/27/18  3:09 PM   Result Value Ref Range    TSH 1.910 0.400 - 4.000 uIU/mL   Troponin I    Collection Time: 11/27/18  3:09 PM   Result Value Ref Range    Troponin I <0.012 0.012 - 0.034 ng/mL   POCT glucose    Collection Time: 11/28/18 12:27 AM   Result Value Ref Range    POCT Glucose 127 (H) 70 - 110 mg/dL   Comprehensive metabolic panel    Collection Time: 11/28/18  1:00 AM   Result Value Ref Range    Sodium 141 136 - 145 mmol/L    Potassium 4.2 3.5 - 5.1 mmol/L    Chloride 106 95 - 110 mmol/L    CO2 25 23 - 29 mmol/L    Glucose 126 (H) 70 - 110 mg/dL    BUN, Bld 25 (H) 8 - 23 mg/dL    Creatinine 0.7 0.5 - 1.4 mg/dL    Calcium 8.4 (L) 8.7 - 10.5 mg/dL    Total Protein 5.5 (L) 6.0 - 8.4 g/dL    Albumin 2.6 (L) 3.5 - 5.2 g/dL    Total Bilirubin 0.6 0.1 - 1.0 mg/dL    Alkaline Phosphatase 164 (H) 55 - 135 U/L     (H) 10 - 40 U/L    ALT 31 10 - 44 U/L    Anion Gap 10 8 - 16 mmol/L    eGFR if African American >60.0 >60 mL/min/1.73 m^2    eGFR if non African American >60.0 >60 mL/min/1.73 m^2   Lipid panel    Collection Time: 11/28/18  1:00 AM   Result Value Ref Range    Cholesterol 147 120 - 199 mg/dL    Triglycerides 89 30 - 150 mg/dL    HDL 46 40 - 75 mg/dL    LDL Cholesterol 83.2 63.0 - 159.0 mg/dL    HDL/Chol Ratio 31.3 20.0 - 50.0 %    Total Cholesterol/HDL Ratio 3.2 2.0 - 5.0    Non-HDL Cholesterol 101 mg/dL   Hemoglobin A1c    Collection Time: 11/28/18  1:00 AM   Result Value Ref Range    Hemoglobin A1C 5.0 4.0 - 5.6 %    Estimated Avg Glucose 97 68 - 131 mg/dL   TSH    Collection Time: 11/28/18  1:00 AM   Result Value Ref Range    TSH 0.834 0.400 - 4.000 uIU/mL   Troponin I    Collection Time: 11/28/18  1:00 AM   Result Value Ref Range    Troponin I >50.000 (H) 0.000 - 0.026 ng/mL   CK-MB    Collection Time: 11/28/18  1:00 AM   Result Value Ref Range    CPK 1421 (H) 20 - 180 U/L    CPK  .8 (H) 0.1 - 6.5 ng/mL    MB% 19.4 (H) 0.0 - 5.0 %   CBC auto differential    Collection Time: 11/28/18  1:00 AM   Result Value Ref Range    WBC 13.54 (H) 3.90 - 12.70 K/uL    RBC 3.45 (L) 4.00 - 5.40 M/uL    Hemoglobin 9.9 (L) 12.0 - 16.0 g/dL    Hematocrit 31.8 (L) 37.0 - 48.5 %    MCV 92 82 - 98 fL    MCH 28.7 27.0 - 31.0 pg    MCHC 31.1 (L) 32.0 - 36.0 g/dL    RDW 13.0 11.5 - 14.5 %    Platelets 469 (H) 150 - 350 K/uL    MPV 9.4 9.2 - 12.9 fL    Immature Granulocytes 0.7 (H) 0.0 - 0.5 %    Gran # (ANC) 11.2 (H) 1.8 - 7.7 K/uL    Immature Grans (Abs) 0.09 (H) 0.00 - 0.04 K/uL    Lymph # 1.2 1.0 - 4.8 K/uL    Mono # 1.0 0.3 - 1.0 K/uL    Eos # 0.0 0.0 - 0.5 K/uL    Baso # 0.06 0.00 - 0.20 K/uL    nRBC 0 0 /100 WBC    Gran% 82.7 (H) 38.0 - 73.0 %    Lymph% 8.9 (L) 18.0 - 48.0 %    Mono% 7.2 4.0 - 15.0 %    Eosinophil% 0.1 0.0 - 8.0 %    Basophil% 0.4 0.0 - 1.9 %    Differential Method Automated    Magnesium    Collection Time: 11/28/18  1:00 AM   Result Value Ref Range    Magnesium 1.8 1.6 - 2.6 mg/dL   Phosphorus    Collection Time: 11/28/18  1:00 AM   Result Value Ref Range    Phosphorus 4.1 2.7 - 4.5 mg/dL   Type & Screen    Collection Time: 11/28/18  1:00 AM   Result Value Ref Range    Group & Rh O POS     Indirect Juliana NEG    Protime-INR    Collection Time: 11/28/18  1:00 AM   Result Value Ref Range    Prothrombin Time 10.9 9.0 - 12.5 sec    INR 1.0 0.8 - 1.2   Troponin I    Collection Time: 11/28/18  3:45 AM   Result Value Ref Range    Troponin I >50.000 (H) 0.000 - 0.026 ng/mL   CK-MB    Collection Time: 11/28/18  3:45 AM   Result Value Ref Range    CPK 1623 (H) 20 - 180 U/L    CPK .4 (H) 0.1 - 6.5 ng/mL    MB% 17.4 (H) 0.0 - 5.0 %   POCT glucose    Collection Time: 11/28/18  6:02 AM   Result Value Ref Range    POCT Glucose 124 (H) 70 - 110 mg/dL         Diagnostic Results       MRI brain:  Pending...        Brain imaging:  CT Head. Date: 11/27/18  No CT evidence of acute intracranial  abnormality.    Vessel Imaging:  CTA Multiphase. Date: 11/27/18  Partial opacification of the left cervical ICA, without meaningful opacification of the ICA distally or intracranially, findings suggests thrombus, either within the vessel itself, or the carotid terminus.  There is some flow within the distal M2 and M3 branches on the left, with improved flow on phase 2 and face 3 however no meaningful flow is identified within the M1 segment on the left.  These findings likely correlate with vague hypoattenuation seen within the region of the left basal ganglia and partially left caudate/subinsular region.    No additional regions of high-grade stenosis or occlusion, please see above for full details.       Cardiac Evaluation:   Echo. Date: 11/12/18  · Left ventricle shows concentric remodeling.  · Left ventricle ejection fraction is at 60%  · RV systolic function is normal.  · Left atrium is severely dilated.  · Mild aortic valve stenosis.  · Aortic valve area is 1.10 cm2; peak velocity is 1.92 m/s; mean gradient is 9.73 mmHg.  · Possible aortic bioprosthetic valve  · Trace aortic regurgitation.  · Mild to moderate mitral stenosis.  · Mild to moderate tricuspid regurgitation.  · Normal central venous pressure (3 mm Hg).  · The estimated PA systolic pressure is 30.67 mm Hg    Repeat TTE 11/28/18:  Pending...        Hansel Donaldson MD  [facility]  Department of Vascular Neurology   [facility]

## 2018-11-28 NOTE — PLAN OF CARE
Problem: SLP Goal  Goal: SLP Goal  Speech Language Pathology Goals  Goals expected to be met by 12/5  1. Ongoing swallow assessment.   2. Pt will elicit verbalization x3 to automatic speech tasks.   3. Pt will follow simple commands x3.  4. Pt will respond via any modality to simple y/n questions.   5. Ongoing cognitive /linguistic assessment as appropriate.        ST evaluation completed.  Pt presents with significant expressive/language deficits and apparent high risk for aspiration.  Recommend pt remain NPO with ongoing swallow assessment by ST at this time. Full session note to follow.      SAMY Finney, CCC-SLP  Speech Language Pathologist  [phone]  11/28/2018

## 2018-11-28 NOTE — HPI
Ms. Watson is a 89 year old female with a pmhx of Htn, New onset afib, and Neuropathy who presents to [facility] for a higher level of care for LMCA syndrome s/p TPA and LICA Thrombectomy. The patient initially presented to [facility] with severe right sided weakness and aphasia with a NIH stroke scale of 22. Tpa end time was 1642.

## 2018-11-28 NOTE — PROGRESS NOTES
[facility]  Neurocritical Care  Progress Note    Admit Date: 11/27/2018  Service Date: 11/28/2018  Length of Stay: 1    Subjective:     Chief Complaint: Embolic stroke involving left carotid artery    History of Present Illness: Ms. Watson is a 89 year old female with a pmhx of Htn, New onset afib, and Neuropathy who presents to [facility] for a higher level of care for LMCA syndrome s/p TPA and LICA Thrombectomy. The patient initially presented to [facility] with severe right sided weakness and aphasia with a NIH stroke scale of 22. Tpa end time was 1642.     Hospital Course: 11/27: Admit to [facility], TPA, Thrombectomy  11/28: Cards consult for trops>50, Mri stable, possible LHC, Heparin drip .3-.5,     Past Medical History:   Diagnosis Date    Atrial fibrillation with RVR 11/28/2018    Depression     Hypertension     Neuropathy      Past Surgical History:   Procedure Laterality Date    APPENDECTOMY      ARTHROPLASTY-HIP-NOLBERTO Right 11/10/2018    Performed by Nasir Danielle MD at [facility] OR    GALLBLADDER SURGERY      HEMIARTHROPLASTY OF HIP Right 11/10/2018    Procedure: ARTHROPLASTY-HIP-NOLBERTO;  Surgeon: Nasir Danielle MD;  Location: [facility] OR;  Service: Orthopedics;  Laterality: Right;  Depuy Bipolar  LAteral positioner with positioners    HYSTERECTOMY      TONSILLECTOMY        Current Facility-Administered Medications on File Prior to Encounter   Medication Dose Route Frequency Provider Last Rate Last Dose    [COMPLETED] alteplase (ACTIVASE) 100 mg injection 48 mg  0.81 mg/kg Intravenous ED 1 Time Benson Adams MD 48 mL/hr at 11/27/18 1542 48 mg at 11/27/18 1542    [COMPLETED] alteplase (ACTIVASE) 100 mg injection 5 mg  0.09 mg/kg Intravenous Once Benson Adams MD   Stopped at 11/27/18 1540     Current Outpatient Medications on File Prior to Encounter   Medication Sig Dispense Refill    amitriptyline (ELAVIL) 10 MG tablet Take 1 tablet (10 mg total) by mouth 2 (two) times  daily. 180 tablet 3    calcium carb/vit D3/minerals (CALCIUM CARBONATE-VIT D3-MIN) 600 mg (1,500 mg)-400 unit Chew Take 1 tablet by mouth once daily. 30 each 3    cyanocobalamin (VITAMIN B-12) 250 MCG tablet Take 250 mcg by mouth once daily.      ergocalciferol (VITAMIN D2) 50,000 unit Cap Take 50,000 Units by mouth every 7 days.      gabapentin (NEURONTIN) 300 MG capsule Take 1 capsule (300 mg total) by mouth every evening. 30 capsule 11    guaifenesin-codeine 100-10 mg/5 ml (TUSSI-ORGANIDIN NR)  mg/5 mL syrup Take 10 mLs by mouth every 4 (four) hours as needed for Cough. 240 mL 0    HYDROcodone-acetaminophen (NORCO)  mg per tablet Take 1 tablet by mouth every 6 (six) hours as needed for Pain. 8 tablet 0    verapamil (CALAN-SR) 240 MG CR tablet Take 1 tablet (240 mg total) by mouth once daily. 90 tablet 3    vitamin A 8000 UNIT capsule Take 8,000 Units by mouth once daily.        Allergies: Celebrex [celecoxib]; Cymbalta [duloxetine]; Iodine and iodide containing products; Lovastatin; Pneumococcal 23-gregorio ps vaccine; Sulpho-lac [sulfur]; Vioxx [rofecoxib]; and Savella [milnacipran]    History reviewed. No pertinent family history.  Social History     Tobacco Use    Smoking status: Never Smoker    Smokeless tobacco: Never Used   Substance Use Topics    Alcohol use: No    Drug use: No     Review of Systems     Unable to obtain due to aphasia  Objective:     Vitals:    Temp: 99.1 °F (37.3 °C)  Pulse: 108  Rhythm: normal sinus rhythm  BP: 111/70  MAP (mmHg): 84  Resp: (!) 27  SpO2: 100 %  O2 Device (Oxygen Therapy): nasal cannula    Temp  Min: 97.9 °F (36.6 °C)  Max: 99.1 °F (37.3 °C)  Pulse  Min: 68  Max: 120  BP  Min: 106/60  Max: 159/70  MAP (mmHg)  Min: 77  Max: 113  Resp  Min: 13  Max: 40  SpO2  Min: 94 %  Max: 100 %    11/27 0701 - 11/28 0700  In: 904.4 [I.V.:904.4]  Out: 550 [Urine:550]   Unmeasured Output  Stool Occurrence: 0       Physical Exam    GA: Alert, comfortable, no acute  distress.   HEENT: No scleral icterus or JVD.   Pulmonary: Clear to auscultation A/L. No wheezing, crackles, or rhonchi.  Cardiac: RRR S1 & S2 w/o rubs/murmurs/gallops.   Abdominal: Bowel sounds present x 4. No appreciable hepatosplenomegaly.  Skin: No jaundice, rashes, or visible lesions.  Neuro:  --GCS: E4 V1 M5  --Mental Status:  AA, not following commands, aphasic, moves left spontaneously, pain on right lower, and no movement on the RUE.  --CN II-XII grossly intact.   --Pupils 2mm, PERRL.   --Corneal reflex, gag, cough intact.  --LUE strength: 4/5  --LLE strength: 5/5  --RUE strength: 0/5  --RLE strength: 3/5      Today I personally reviewed pertinent medications, lines/drains/airways, imaging, cardiology results, laboratory results, microbiology results, notably:        Assessment/Plan:     Neuro   * Embolic stroke involving left carotid artery    -- Continue Neuro checks q 1hr  -- Vascular Neurology consulted  -- CTA multiphase pending  -- SBP goal <140  -- PT/OT/Speech  -- CXR  -- Electrolyte replace prn  -- Tici 2C reperfusion   -- 11/28 Mri stable     Cardiac/Vascular   Atrial fibrillation    -- Metoprolol adjusted     NSTEMI (non-ST elevated myocardial infarction)    -- Card considering [facility] pending echo  -- Hep drip started per cards rec     Essential hypertension    -- Continue to monitor HR and BP   -- SBP goal < 140  -- Metoprolol                Activity Orders          Straight Cath starting at 11/28 1029        Full Code    Linus Carolina NP  Neurocritical Care  [facility]

## 2018-11-28 NOTE — NURSING
JENNIFER Evans at bedside to assess pt, had runs of Vtach for the third time; no new orders given; awaiting for lab results; RN will continue to monitor

## 2018-11-28 NOTE — ED NOTES
Pt transported on cardiac monitor with MD and RN to IR. Family to waiting room. Report given to Neuro ICU nurse, Angelina.

## 2018-11-28 NOTE — CONSULTS
[facility]  Cardiology  Consult Note    Patient Name: Apple Watson  MRN: [id number]  Admission Date: 11/27/2018  Hospital Length of Stay: 1 days  Code Status: Full Code   Attending Provider: eMl Castaneda MD   Consulting Provider: Be Burrell MD  Primary Care Physician: Tree Rolon MD  Principal Problem:Embolic stroke involving left carotid artery    Patient information was obtained from relative.     Inpatient consult to Cardiology  Consult performed by: Be Burrell MD  Consult ordered by: Katarzyna Shah NP        Subjective:     Chief Complaint:  Elevated troponin     HPI:   Briefly, Ms Watson is an 89 F with PMH of HTN, New onset Afib 2 weeks ago (CHADSVASC 6), Recent hip fx s/p athroplasty who p/w L MCA stroke s/p carotid thrombectomy at [facility] [place] and tPA at OSH (Time of tPA 4pm 11/27). She initially had a negative troponin at the OSH <0.012 but while here post procedure had a short run of NSVT but incapable of complaining of CP due to aphasia from stroke. Troponin was checked and found  To be >50 x 2. No real EKG changes, non spectific ST T wave changes in ant leads. Bedside TTE: EF seems preserved low normal 50-55%, no obvious akinesis or hypokinesis, good RV function, severely enlarged LA, eccentric MR jet. Of note per daughters' report, no c/o chest pain or SOB prior to stroke. Never smoker.  Cardiology consulted for elevated troponin.    Past Medical History:   Diagnosis Date    Depression     Hypertension     Neuropathy        Past Surgical History:   Procedure Laterality Date    APPENDECTOMY      ARTHROPLASTY-HIP-NOLBERTO Right 11/10/2018    Performed by Nasir Danielle MD at [facility] OR    GALLBLADDER SURGERY      HEMIARTHROPLASTY OF HIP Right 11/10/2018    Procedure: ARTHROPLASTY-HIP-NOLBERTO;  Surgeon: Nasir Danielle MD;  Location: [facility] OR;  Service: Orthopedics;  Laterality: Right;  Depuy Bipolar  LAteral positioner with positioners    HYSTERECTOMY       TONSILLECTOMY         Review of patient's allergies indicates:   Allergen Reactions    Celebrex [celecoxib] Other (See Comments)     Blood in bowel    Cymbalta [duloxetine]     Iodine and iodide containing products     Lovastatin     Pneumococcal 23-gregorio ps vaccine     Sulpho-lac [sulfur]     Vioxx [rofecoxib]     Savella [milnacipran] Rash       Current Facility-Administered Medications on File Prior to Encounter   Medication    [COMPLETED] alteplase (ACTIVASE) 100 mg injection 48 mg    [COMPLETED] alteplase (ACTIVASE) 100 mg injection 5 mg     Current Outpatient Medications on File Prior to Encounter   Medication Sig    amitriptyline (ELAVIL) 10 MG tablet Take 1 tablet (10 mg total) by mouth 2 (two) times daily.    calcium carb/vit D3/minerals (CALCIUM CARBONATE-VIT D3-MIN) 600 mg (1,500 mg)-400 unit Chew Take 1 tablet by mouth once daily.    cyanocobalamin (VITAMIN B-12) 250 MCG tablet Take 250 mcg by mouth once daily.    ergocalciferol (VITAMIN D2) 50,000 unit Cap Take 50,000 Units by mouth every 7 days.    gabapentin (NEURONTIN) 300 MG capsule Take 1 capsule (300 mg total) by mouth every evening.    guaifenesin-codeine 100-10 mg/5 ml (TUSSI-ORGANIDIN NR)  mg/5 mL syrup Take 10 mLs by mouth every 4 (four) hours as needed for Cough.    HYDROcodone-acetaminophen (NORCO)  mg per tablet Take 1 tablet by mouth every 6 (six) hours as needed for Pain.    verapamil (CALAN-SR) 240 MG CR tablet Take 1 tablet (240 mg total) by mouth once daily.    vitamin A 8000 UNIT capsule Take 8,000 Units by mouth once daily.     Family History     None        Tobacco Use    Smoking status: Never Smoker    Smokeless tobacco: Never Used   Substance and Sexual Activity    Alcohol use: No    Drug use: No    Sexual activity: Not on file     Review of Systems   Unable to perform ROS: patient nonverbal     Objective:     Vital Signs (Most Recent):  Temp: 97.9 °F (36.6 °C) (11/28/18 0305)  Pulse: (!) 111  (11/28/18 0600)  Resp: (!) 25 (11/28/18 0600)  BP: 126/74 (11/28/18 0600)  SpO2: 100 % (11/28/18 0600) Vital Signs (24h Range):  Temp:  [97.9 °F (36.6 °C)-98.5 °F (36.9 °C)] 97.9 °F (36.6 °C)  Pulse:  [] 111  Resp:  [12-40] 25  SpO2:  [94 %-100 %] 100 %  BP: (112-159)/(58-89) 126/74     Weight: 63.7 kg (140 lb 6.9 oz)  Body mass index is 28.36 kg/m².    SpO2: 100 %  O2 Device (Oxygen Therapy): nasal cannula      Intake/Output Summary (Last 24 hours) at 11/28/2018 0702  Last data filed at 11/28/2018 0605  Gross per 24 hour   Intake 904.38 ml   Output 550 ml   Net 354.38 ml       Lines/Drains/Airways     Drain            Female External Urinary Catheter 11/27/18 2100 less than 1 day          Peripheral Intravenous Line                 Peripheral IV - Single Lumen 11/27/18 1509 Left Forearm less than 1 day         Peripheral IV - Single Lumen 11/27/18 1520 Right Forearm less than 1 day                Physical Exam   Constitutional: She is oriented to person, place, and time. She appears well-developed and well-nourished. No distress.   Eyes: Conjunctivae are normal. Pupils are equal, round, and reactive to light.   Neck: No tracheal deviation present. No thyromegaly present.   Cardiovascular: Normal rate and intact distal pulses. An irregularly irregular rhythm present. Exam reveals no gallop and no friction rub.   Murmur heard.  Systolic ejection murmur RUSB 2/6   Pulmonary/Chest: Effort normal and breath sounds normal. No respiratory distress. She has no wheezes. She has no rales.   Abdominal: Soft. Bowel sounds are normal. She exhibits no distension. There is no tenderness.   Musculoskeletal: She exhibits no edema or deformity.   Neurological: She is alert and oriented to person, place, and time. No cranial nerve deficit. Coordination normal.   Skin: Skin is warm and dry. She is not diaphoretic.   Psychiatric: She has a normal mood and affect. Her behavior is normal.       Significant Labs:   BMP:   Recent  Labs   Lab 11/27/18  1509 11/28/18  0100   * 126*    141   K 3.9 4.2    106   CO2 29 25   BUN 26* 25*   CREATININE 0.69 0.7   CALCIUM 8.5* 8.4*   MG  --  1.8   , CMP   Recent Labs   Lab 11/27/18  1509 11/28/18  0100    141   K 3.9 4.2    106   CO2 29 25   * 126*   BUN 26* 25*   CREATININE 0.69 0.7   CALCIUM 8.5* 8.4*   PROT 6.6 5.5*   ALBUMIN 3.4* 2.6*   BILITOT 0.6 0.6   ALKPHOS 133* 164*   AST 28 152*   ALT 12 31   ANIONGAP 7* 10   ESTGFRAFRICA >60.0 >60.0   EGFRNONAA >60.0 >60.0   , CBC   Recent Labs   Lab 11/27/18  1509 11/28/18  0100   WBC 9.19 13.54*   HGB 11.0* 9.9*   HCT 36.6* 31.8*   * 469*   , Lipid Panel   Recent Labs   Lab 11/28/18  0100   CHOL 147   HDL 46   LDLCALC 83.2   TRIG 89   CHOLHDL 31.3    and Troponin   Recent Labs   Lab 11/27/18  1509 11/28/18  0100 11/28/18  0345   TROPONINI <0.012 >50.000* >50.000*       Significant Imaging: Echocardiogram: 2D echo with color flow doppler: No results found for this or any previous visit.    Assessment and Plan:     Atrial fibrillation    - CHADSVASC 6   - Recommend Metoprolol tart  25 bid  - If makes meaningful recovery from stroke, recommend long term OAC e.g. Eliquis     Elevated troponin          NSTEMI (non-ST elevated myocardial infarction)    Elevated troponin likely to be sequelae of acute coronary syndrome (unclear if the result of embolic phenomena from atrial thrombus in the setting of her acute CVA and active atrial fib) with markedly elevated tropnins, transient EKG with brief ST elevations, and unable to communicate chest pain, in the setting of possible supply/demand ischemia 2 weeks ago.      - Patient would benefit from therapy for ACS with ASA, Plavix 75mg qday and Heparin gtt for 48hrs, when cleared by neuro-teams  - Start 25 bid metoprolol tartrate, and up-titrate for target HR <110  - will need full anticoagulation as outpatient for high risk AF (FAFPL4E = 5) with high likelihood of atrial  thrombus  - follow up 2D echo, will determine need for angiography based on these results         VTE Risk Mitigation (From admission, onward)        Ordered     Reason for No Pharmacological VTE Prophylaxis  Once      11/27/18 1743     IP VTE HIGH RISK PATIENT  Once      11/27/18 1743     Place sequential compression device  Until discontinued      11/27/18 1743          Thank you for your consult. I will follow-up with patient. Please contact us if you have any additional questions.    Be Burrell MD  Cardiology   [facility]

## 2018-11-28 NOTE — SUBJECTIVE & OBJECTIVE
Neurologic Chief Complaint: L ICA terminus occlusion  RSW, RFD, L-gaze, Mute    Subjective:     Interval History: Patient is seen for follow-up neurological assessment and treatment recommendations: Initiate AC and rate control with metoprolol     HPI, Past Medical, Family, and Social History remains the same as documented in the initial encounter.     Review of Systems  Scheduled Meds:   atorvastatin  40 mg Oral Daily    metoprolol  2.5 mg Intravenous Q8H    sodium chloride 0.9%  3 mL Intravenous Q8H     Continuous Infusions:   sodium chloride 0.9% 75 mL/hr at 11/28/18 0605    niCARdipine 2.5 mg/hr (11/28/18 0605)     PRN Meds:acetaminophen, calcium gluconate IVPB, calcium gluconate IVPB, calcium gluconate IVPB, magnesium sulfate IVPB, magnesium sulfate IVPB, ondansetron, sodium chloride 0.9%    Objective:     Vital Signs (Most Recent):  Temp: 98.1 °F (36.7 °C) (11/28/18 0701)  Pulse: 110 (11/28/18 0800)  Resp: (!) 23 (11/28/18 0800)  BP: (!) 112/58 (11/28/18 0800)  SpO2: 100 % (11/28/18 0800)  BP Location: Right arm    Vital Signs Range (Last 24H):  Temp:  [97.9 °F (36.6 °C)-98.5 °F (36.9 °C)]   Pulse:  []   Resp:  [12-40]   BP: (112-159)/(58-89)   SpO2:  [94 %-100 %]   BP Location: Right arm    Physical Exam    Neurological Exam:   Neurological Exam:   LOC: alert  Attention Span: Good   Language: mute   Articulation: mute  Orientation: Not oriented to time  Visual Fields: left gaze  EOM (CN III, IV, VI): Full/intact  Pupils (CN II, III): PERRL  Facial Sensation (CN V): Normal  Facial Movement (CN VII): Symmetric facial expression    Motor: Arm left  Normal 4/5  Leg left  Normal 0/5  Arm right  Normal 1/5  Leg right Normal 1/5  Sensation: unable  Tone: Normal tone throughout  Reflexes +2 throughout               Laboratory:    Recent Results (from the past 24 hour(s))   CBC W/ AUTO DIFFERENTIAL    Collection Time: 11/27/18  3:09 PM   Result Value Ref Range    WBC 9.19 3.90 - 12.70 K/uL    RBC 3.84 (L)  4.00 - 5.40 M/uL    Hemoglobin 11.0 (L) 12.0 - 16.0 g/dL    Hematocrit 36.6 (L) 37.0 - 48.5 %    MCV 95 82 - 98 fL    MCH 28.6 27.0 - 31.0 pg    MCHC 30.1 (L) 32.0 - 36.0 g/dL    RDW 13.4 11.5 - 14.5 %    Platelets 437 (H) 150 - 350 K/uL    MPV 9.5 9.2 - 12.9 fL    Gran # (ANC) 6.3 1.8 - 7.7 K/uL    Lymph # 1.5 1.0 - 4.8 K/uL    Mono # 1.1 (H) 0.3 - 1.0 K/uL    Eos # 0.2 0.0 - 0.5 K/uL    Baso # 0.07 0.00 - 0.20 K/uL    Gran% 68.8 38.0 - 73.0 %    Lymph% 16.8 (L) 18.0 - 48.0 %    Mono% 11.4 4.0 - 15.0 %    Eosinophil% 1.8 0.0 - 8.0 %    Basophil% 0.8 0.0 - 1.9 %    Differential Method Automated    Comprehensive metabolic panel    Collection Time: 11/27/18  3:09 PM   Result Value Ref Range    Sodium 137 136 - 145 mmol/L    Potassium 3.9 3.5 - 5.1 mmol/L    Chloride 101 95 - 110 mmol/L    CO2 29 23 - 29 mmol/L    Glucose 127 (H) 70 - 110 mg/dL    BUN, Bld 26 (H) 7 - 17 mg/dL    Creatinine 0.69 0.50 - 1.40 mg/dL    Calcium 8.5 (L) 8.7 - 10.5 mg/dL    Total Protein 6.6 6.0 - 8.4 g/dL    Albumin 3.4 (L) 3.5 - 5.2 g/dL    Total Bilirubin 0.6 0.1 - 1.0 mg/dL    Alkaline Phosphatase 133 (H) 38 - 126 U/L    AST 28 15 - 46 U/L    ALT 12 10 - 44 U/L    Anion Gap 7 (L) 8 - 16 mmol/L    eGFR if African American >60.0 >60 mL/min/1.73 m^2    eGFR if non African American >60.0 >60 mL/min/1.73 m^2   Protime-INR    Collection Time: 11/27/18  3:09 PM   Result Value Ref Range    Prothrombin Time 13.2 (H) 9.0 - 12.5 sec    INR 1.2 0.8 - 1.2   TSH    Collection Time: 11/27/18  3:09 PM   Result Value Ref Range    TSH 1.910 0.400 - 4.000 uIU/mL   Troponin I    Collection Time: 11/27/18  3:09 PM   Result Value Ref Range    Troponin I <0.012 0.012 - 0.034 ng/mL   POCT glucose    Collection Time: 11/28/18 12:27 AM   Result Value Ref Range    POCT Glucose 127 (H) 70 - 110 mg/dL   Comprehensive metabolic panel    Collection Time: 11/28/18  1:00 AM   Result Value Ref Range    Sodium 141 136 - 145 mmol/L    Potassium 4.2 3.5 - 5.1 mmol/L     Chloride 106 95 - 110 mmol/L    CO2 25 23 - 29 mmol/L    Glucose 126 (H) 70 - 110 mg/dL    BUN, Bld 25 (H) 8 - 23 mg/dL    Creatinine 0.7 0.5 - 1.4 mg/dL    Calcium 8.4 (L) 8.7 - 10.5 mg/dL    Total Protein 5.5 (L) 6.0 - 8.4 g/dL    Albumin 2.6 (L) 3.5 - 5.2 g/dL    Total Bilirubin 0.6 0.1 - 1.0 mg/dL    Alkaline Phosphatase 164 (H) 55 - 135 U/L     (H) 10 - 40 U/L    ALT 31 10 - 44 U/L    Anion Gap 10 8 - 16 mmol/L    eGFR if African American >60.0 >60 mL/min/1.73 m^2    eGFR if non African American >60.0 >60 mL/min/1.73 m^2   Lipid panel    Collection Time: 11/28/18  1:00 AM   Result Value Ref Range    Cholesterol 147 120 - 199 mg/dL    Triglycerides 89 30 - 150 mg/dL    HDL 46 40 - 75 mg/dL    LDL Cholesterol 83.2 63.0 - 159.0 mg/dL    HDL/Chol Ratio 31.3 20.0 - 50.0 %    Total Cholesterol/HDL Ratio 3.2 2.0 - 5.0    Non-HDL Cholesterol 101 mg/dL   Hemoglobin A1c    Collection Time: 11/28/18  1:00 AM   Result Value Ref Range    Hemoglobin A1C 5.0 4.0 - 5.6 %    Estimated Avg Glucose 97 68 - 131 mg/dL   TSH    Collection Time: 11/28/18  1:00 AM   Result Value Ref Range    TSH 0.834 0.400 - 4.000 uIU/mL   Troponin I    Collection Time: 11/28/18  1:00 AM   Result Value Ref Range    Troponin I >50.000 (H) 0.000 - 0.026 ng/mL   CK-MB    Collection Time: 11/28/18  1:00 AM   Result Value Ref Range    CPK 1421 (H) 20 - 180 U/L    CPK .8 (H) 0.1 - 6.5 ng/mL    MB% 19.4 (H) 0.0 - 5.0 %   CBC auto differential    Collection Time: 11/28/18  1:00 AM   Result Value Ref Range    WBC 13.54 (H) 3.90 - 12.70 K/uL    RBC 3.45 (L) 4.00 - 5.40 M/uL    Hemoglobin 9.9 (L) 12.0 - 16.0 g/dL    Hematocrit 31.8 (L) 37.0 - 48.5 %    MCV 92 82 - 98 fL    MCH 28.7 27.0 - 31.0 pg    MCHC 31.1 (L) 32.0 - 36.0 g/dL    RDW 13.0 11.5 - 14.5 %    Platelets 469 (H) 150 - 350 K/uL    MPV 9.4 9.2 - 12.9 fL    Immature Granulocytes 0.7 (H) 0.0 - 0.5 %    Gran # (ANC) 11.2 (H) 1.8 - 7.7 K/uL    Immature Grans (Abs) 0.09 (H) 0.00 - 0.04 K/uL     Lymph # 1.2 1.0 - 4.8 K/uL    Mono # 1.0 0.3 - 1.0 K/uL    Eos # 0.0 0.0 - 0.5 K/uL    Baso # 0.06 0.00 - 0.20 K/uL    nRBC 0 0 /100 WBC    Gran% 82.7 (H) 38.0 - 73.0 %    Lymph% 8.9 (L) 18.0 - 48.0 %    Mono% 7.2 4.0 - 15.0 %    Eosinophil% 0.1 0.0 - 8.0 %    Basophil% 0.4 0.0 - 1.9 %    Differential Method Automated    Magnesium    Collection Time: 11/28/18  1:00 AM   Result Value Ref Range    Magnesium 1.8 1.6 - 2.6 mg/dL   Phosphorus    Collection Time: 11/28/18  1:00 AM   Result Value Ref Range    Phosphorus 4.1 2.7 - 4.5 mg/dL   Type & Screen    Collection Time: 11/28/18  1:00 AM   Result Value Ref Range    Group & Rh O POS     Indirect Juliana NEG    Protime-INR    Collection Time: 11/28/18  1:00 AM   Result Value Ref Range    Prothrombin Time 10.9 9.0 - 12.5 sec    INR 1.0 0.8 - 1.2   Troponin I    Collection Time: 11/28/18  3:45 AM   Result Value Ref Range    Troponin I >50.000 (H) 0.000 - 0.026 ng/mL   CK-MB    Collection Time: 11/28/18  3:45 AM   Result Value Ref Range    CPK 1623 (H) 20 - 180 U/L    CPK .4 (H) 0.1 - 6.5 ng/mL    MB% 17.4 (H) 0.0 - 5.0 %   POCT glucose    Collection Time: 11/28/18  6:02 AM   Result Value Ref Range    POCT Glucose 124 (H) 70 - 110 mg/dL         Diagnostic Results       MRI brain:  Pending...        Brain imaging:  CT Head. Date: 11/27/18  No CT evidence of acute intracranial abnormality.    Vessel Imaging:  CTA Multiphase. Date: 11/27/18  Partial opacification of the left cervical ICA, without meaningful opacification of the ICA distally or intracranially, findings suggests thrombus, either within the vessel itself, or the carotid terminus.  There is some flow within the distal M2 and M3 branches on the left, with improved flow on phase 2 and face 3 however no meaningful flow is identified within the M1 segment on the left.  These findings likely correlate with vague hypoattenuation seen within the region of the left basal ganglia and partially left  caudate/subinsular region.    No additional regions of high-grade stenosis or occlusion, please see above for full details.       Cardiac Evaluation:   Echo. Date: 11/12/18  · Left ventricle shows concentric remodeling.  · Left ventricle ejection fraction is at 60%  · RV systolic function is normal.  · Left atrium is severely dilated.  · Mild aortic valve stenosis.  · Aortic valve area is 1.10 cm2; peak velocity is 1.92 m/s; mean gradient is 9.73 mmHg.  · Possible aortic bioprosthetic valve  · Trace aortic regurgitation.  · Mild to moderate mitral stenosis.  · Mild to moderate tricuspid regurgitation.  · Normal central venous pressure (3 mm Hg).  · The estimated PA systolic pressure is 30.67 mm Hg    Repeat TTE 11/28/18:  Pending...

## 2018-11-28 NOTE — ASSESSMENT & PLAN NOTE
First noted on EKG post hip replacement surgery, 11/11/18, No anticoagulation was initiated at that time.   A fib noted on monitor on arrival in ED, EKG obtained confirmed afib RVR  Will need to discuss secondary stroke prevention with patient and family    Atrial Fibrillation Assessment ([id number]):       Condition Points    C   Congestive heart failure (or Left ventricular systolic dysfunction) 0    H Hypertension: blood pressure consistently above 140/90 mmHg (or treated hypertension on medication) 1    A2  Age ?75 years 2    D  Diabetes Mellitus 0    S2  Prior Stroke or TIA or Thromboembolism  2    V  Vascular disease (e.g. peripheral artery disease, myocardial infarction, aortic plaque) ?    A  Age 65-74 years 0    Sc  Sex category (i.e. female sex) 1                                                                                  [id number] Score: 6  [id number] Score Stroke Risk %   6 9.8                                                                                  Annual Stroke Risk:  9.8%    Score Risk Anticoagulation Therapy Considerations   2 or greater High Oral anticoagulant Oral anticoagulant, using either a new oral anticoagulant drug   (apixaban, rivaroxaban or dabigatran) or well controlled Warfarin at INR 2.0-3.0             Recommendation:     -Begin  (24h after tPA), consider DAPT prior to discharge   -If transition to warfarin, would likely need heparin bridge   -[id number] score of 6= 9.% risk. Would begin patient on AC prior to discharge, if no other contraindication. Cardiology following.    -Rate control with Metoprolol tartrate while inpatient and transition to 24h metoprolol succinate prior to d/c.

## 2018-11-28 NOTE — ASSESSMENT & PLAN NOTE
Apple Watson is a 89 y.o. female with PMHx of HTN and new onset a fib who presented to OSH with L MCA syndrome. She was treated with tPA and transferred to [facility] [place]. CTA with L ICA occlusion, patient taken to IR for possible intervention.       Antithrombotics for secondary stroke prevention: Antiplatelets: None: Hold all Antithrombotics x 24 hours after IV t-PA administration    Statins for secondary stroke prevention and hyperlipidemia, if present:   Statins: Atorvastatin- 40 mg daily    Aggressive risk factor modification: HTN, A-Fib     Rehab efforts: Occupational Therapy, PT/OT/SLP to evaluate and treat, PM&R consult     Diagnostics ordered/pending: HgbA1C to assess blood glucose levels, Lipid Profile to assess cholesterol levels, MRI head without contrast to assess brain parenchyma, TTE to assess cardiac function/status , TSH to assess thyroid function    VTE prophylaxis: None: Reason for No Pharmacological VTE Prophylaxis: Mechanical prophylaxis: Place SCDs    BP parameters: Infarct: Post tPA, SBP <180

## 2018-11-28 NOTE — CONSULTS
[facility]  Vascular Neurology  Comprehensive Stroke Center  Consult Note    Inpatient consult to Vascular (Stroke) Neurology  Consult performed by: Yolanda Manriquez PA-C  Consult ordered by: Linus Carolina NP    Inpatient consult to Vascular Neurology  Consult performed by: Yolanda Manriquez PA-C  Consult ordered by: Breonna Ogden MD        Assessment/Plan:     Patient is a 89 y.o. year old female with:    * Embolic stroke involving left carotid artery    Apple Watson is a 89 y.o. female with PMHx of HTN and new onset a fib who presented to OSH with L MCA syndrome. She was treated with tPA and transferred to [facility]. CTA with L ICA occlusion, patient taken to IR for possible intervention.       Antithrombotics for secondary stroke prevention: Antiplatelets: None: Hold all Antithrombotics x 24 hours after IV t-PA administration    Statins for secondary stroke prevention and hyperlipidemia, if present:   Statins: Atorvastatin- 40 mg daily    Aggressive risk factor modification: HTN, A-Fib     Rehab efforts: Occupational Therapy, PT/OT/SLP to evaluate and treat, PM&R consult     Diagnostics ordered/pending: HgbA1C to assess blood glucose levels, Lipid Profile to assess cholesterol levels, MRI head without contrast to assess brain parenchyma, TTE to assess cardiac function/status , TSH to assess thyroid function    VTE prophylaxis: None: Reason for No Pharmacological VTE Prophylaxis: Mechanical prophylaxis: Place SCDs    BP parameters: Infarct: Post tPA, SBP <180         Received intravenous tissue plasminogen activator (tPA) in emergency department    Admitting to neuro ICU for post tPA monitoring     Paroxysmal atrial fibrillation    Stroke risk factor  First noted on EKG post hip replacement surgery, 11/11/18  No anticoagulation initiated  Rate treated with metoprolol  Patient returned to normal sinus rhythm spontaneously  A fib noted on monitor on arrival in ED, EKG obtained to  confirm  Will need to discuss secondary stroke prevention with patient and family      Closed fracture of neck of right femur    Recent surgery on 11/10/18     Essential hypertension    Stroke risk factor  SBP <180 post tPA  Management per neuro ICU  BP currently at goal         STROKE DOCUMENTATION     Acute Stroke Times   Last Known Normal Date: 11/27/18  Last Known Normal Time: 1300  Symptom Onset Date: 11/27/18  Symptom Onset Time: 1330  Stroke Team Called Date: 11/27/18  Stroke Team Called Time: 1649  Stroke Team Arrival Date: 11/27/18  Stroke Team Arrival Time: 1650  CT Interpretation Time: 1718  Decision to Treat Time for Alteplase: (administed at OSH via telemedicine)  Decision to Treat Time for IR: 1732    NIH Scale:  Interval: baseline (upon arrival/admit)  1a. Level Of Consciousness: 0-->Alert: keenly responsive  1b. LOC Questions: 2-->Answers neither question correctly  1c. LOC Commands: 2-->Performs neither task correctly  2. Best Gaze: 2-->Forced deviation, or total gaze paresis not overcome by the oculocephalic maneuver  3. Visual: 2-->Complete hemianopia  4. Facial Palsy: 2-->Partial paralysis (total or near-total paralysis of lower face)  5a. Motor Arm, Left: 0-->No drift: limb holds 90 (or 45) degrees for full 10 secs  5b. Motor Arm, Right: 4-->No movement  6a. Motor Leg, Left: 0-->No drift: leg holds 30 degree position for full 5 secs  6b. Motor Leg, Right: 4-->No movement  7. Limb Ataxia: 0-->Absent  8. Sensory: 0-->Normal: no sensory loss  9. Best Language: 3-->Mute, global aphasia: no usable speech or auditory comprehension  10. Dysarthria: 2-->Severe dysarthria: patients speech is so slurred as to be unintelligible in the absence of or out of proportion to any dysphasia, or is mute/anarthric  11. Extinction and Inattention (formerly Neglect): 0-->No abnormality  Total (NIH Stroke Scale): 23    Modified Lucas Score: 3  Mor Coma Scale:    ABCD2 Score:    [id number] Score:   HAS -BLED  Score:   ICH Score:   Hunt & Tapia Classification:       Thrombolysis Candidate? Yes, given prior to arrival at outside hospital      Interventional Revascularization Candidate?   Is the patient eligible for mechanical endovascular reperfusion (SYED)?  Yes      Hemorrhagic change of an Ischemic Stroke: Does this patient have an ischemic stroke with hemorrhagic changes? No     Subjective:     History of Present Illness:  Apple Watson is a 89 y.o. female with PMHx who presented as transfer from [facility] with L MCA syndrome. Patient was treated with tPA. Hyperdense MCA sign seen on CT head and patient transferred for possible intervention. On arrival, patient without improvement in symptoms.       At home taking a nap before home health therapy. Daughter woke her up and noted RSW and patient was nonverbal. Patient would not stand up and patient would not respond. Family states patient had a recent surgery on 11/10 for hip replacement and patient had abnormal heart rhythm afterwards. Patient family unaware of type of rhythm.        Past Medical History:   Diagnosis Date    Depression     Hypertension     Neuropathy      Past Surgical History:   Procedure Laterality Date    APPENDECTOMY      ARTHROPLASTY-HIP-NOLBERTO Right 11/10/2018    Performed by [facility]ir [facility]le [facility] at [facility]    GALLBLADDER SURGERY      HEMIARTHROPLASTY OF HIP Right 11/10/2018    Procedure: ARTHROPLASTY-HIP-NOLBERTO;  Surgeon: Nasir Danielle MD;  Location: [facility] OR;  Service: Orthopedics;  Laterality: Right;  Depuy Bipolar  LAteral positioner with positioners    HYSTERECTOMY      TONSILLECTOMY       No family history on file.  Social History     Tobacco Use    Smoking status: Never Smoker    Smokeless tobacco: Never Used   Substance Use Topics    Alcohol use: No    Drug use: No     Review of patient's allergies indicates:   Allergen Reactions    Celebrex [celecoxib] Other (See Comments)     Blood in bowel    Cymbalta  [duloxetine]     Iodine and iodide containing products     Lovastatin     Pneumococcal 23-gregorio ps vaccine     Sulpho-lac [sulfur]     Vioxx [rofecoxib]     Savella [milnacipran] Rash       Medications: I have reviewed the current medication administration record.      (Not in a hospital admission)    Review of Systems   Constitutional: Negative for chills and fever.   Respiratory: Negative for cough.    Cardiovascular: Negative for chest pain.   Gastrointestinal: Negative for nausea and vomiting.   Neurological: Positive for facial asymmetry, speech difficulty and weakness.     Objective:     Vital Signs (Most Recent):  Pulse: 88 (11/27/18 1650)  Resp: 18 (11/27/18 1650)  BP: (!) 148/73 (11/27/18 1650)  SpO2: (!) 94 % (11/27/18 1650)    Vital Signs Range (Last 24H):  Temp:  [98.2 °F (36.8 °C)]   Pulse:  [68-88]   Resp:  [12-25]   BP: (136-155)/(65-73)   SpO2:  [94 %-100 %]     Physical Exam   Constitutional: She appears well-developed and well-nourished. No distress.   HENT:   Head: Normocephalic and atraumatic.   Eyes: Pupils are equal, round, and reactive to light.   Cardiovascular: Normal rate.   Pulmonary/Chest: Effort normal. No respiratory distress.   Neurological: She is alert.   Skin: Skin is warm and dry.   Vitals reviewed.      Neurological Exam:   LOC: alert  Attention Span: poor  Language: Global aphasia  Articulation: Untestable due to severe aphasia   Orientation: Untestable due to severe aphasia   Visual Fields: Hemianopsia right  EOM (CN III, IV, VI): Gaze preference  left  Pupils (CN II, III): PERRL  Facial Movement (CN VII): Lower facial weakness on the Right  Motor: Arm left  Normal 5/5  Leg left  Normal 5/5  Arm right  Plegia 0/5  Leg right Plegia 0/5  Sensation: Intact to light touch, temperature and vibration  Tone: Flaccid  RUE and RLE       Laboratory:  CMP:   Recent Labs   Lab 11/27/18  1509   CALCIUM 8.5*   ALBUMIN 3.4*   PROT 6.6      K 3.9   CO2 29      BUN 26*    CREATININE 0.69   ALKPHOS 133*   ALT 12   AST 28   BILITOT 0.6     CBC:   Recent Labs   Lab 11/27/18  1509   WBC 9.19   RBC 3.84*   HGB 11.0*   HCT 36.6*   *   MCV 95   MCH 28.6   MCHC 30.1*     Lipid Panel: No results for input(s): CHOL, LDLCALC, HDL, TRIG in the last 168 hours.  Coagulation:   Recent Labs   Lab 11/27/18  1509   INR 1.2     Hgb A1C: No results for input(s): HGBA1C in the last 168 hours.  TSH:   Recent Labs   Lab 11/27/18  1509   TSH 1.910       Diagnostic Results:      Brain imaging:  CT Head. Date: 11/27/18  No CT evidence of acute intracranial abnormality.    All CT scans at this facility are performed  using dose modulation techniques as appropriate to performed exam including the following:  automated exposure control; adjustment of mA and/or kV according to the patients size (this includes techniques or standardized protocols for targeted exams where dose is matched to indication/reason for exam: i.e. extremities or head);  iterative reconstruction technique.    Vessel Imaging:  CTA Multiphase. Date: 11/27/18  Partial opacification of the left cervical ICA, without meaningful opacification of the ICA distally or intracranially, findings suggests thrombus, either within the vessel itself, or the carotid terminus.  There is some flow within the distal M2 and M3 branches on the left, with improved flow on phase 2 and face 3 however no meaningful flow is identified within the M1 segment on the left.  These findings likely correlate with vague hypoattenuation seen within the region of the left basal ganglia and partially left caudate/subinsular region.    No additional regions of high-grade stenosis or occlusion, please see above for full details.    Heterogeneous appearance of the thyroid, ultrasound could be performed for additional evaluation as warranted.    Cardiac Evaluation:   Echo. Date: 11/12/18  · Left ventricle shows concentric remodeling.  · Left ventricle ejection fraction is  at 60%  · RV systolic function is normal.  · Left atrium is severely dilated.  · Mild aortic valve stenosis.  · Aortic valve area is 1.10 cm2; peak velocity is 1.92 m/s; mean gradient is 9.73 mmHg.  · Possible aortic bioprosthetic valve  · Trace aortic regurgitation.  · Mild to moderate mitral stenosis.  · Mild to moderate tricuspid regurgitation.  · Normal central venous pressure (3 mm Hg).  · The estimated PA systolic pressure is 30.67 mm Hg            Yolanda Manriquez PA-C  Comprehensive Stroke Center  Department of Vascular Neurology   [facility]

## 2018-11-28 NOTE — PT/OT/SLP EVAL
Occupational Therapy   Evaluation    Name: pAple Watson  MRN: [id number]  Admitting Diagnosis:  Embolic stroke involving left carotid artery      Recommendations:     Discharge Recommendations: nursing facility, skilled  Discharge Equipment Recommendations:  (TBD)  Barriers to discharge:  None    History:     Occupational Profile:  Living Environment & PLOF: Prior to recent hip fracture & arthroplasty (11/10/18) pt lived alone in 1 story house with no steps to enter & 1 step inside of house in [place].  Pt was modified independent with ADL's, yardwork, housework, cooking, & ambulation.  Since 11/10/18 pt was at [facility] rehab for 1 week (family took her out due to pt not doing well mentally there) and then was at daughter-in-law's house for 1 week.  Daughter-in-law's house is 1 story with 1 step to enter.  Pt was getting home health therapy.  Pt was independent with donning pull over shirts & required (A) with donning socks, shoes & pants and toileting.  Pt required SBA with transfers & ambulation and required (A) with supine to sit & transfers from EOB due to height of bed.  Pt required (A) with bathing.  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  All history provided by family.  Equipment Used at Home:  (shower chair, SC, RW, w/c, 3 in 1 commode)  Assistance upon Discharge: pt was recently staying with her daughter-in-law    Past Medical History:   Diagnosis Date    Atrial fibrillation with RVR 11/28/2018    Depression     Hypertension     Neuropathy        Past Surgical History:   Procedure Laterality Date    APPENDECTOMY      ARTHROPLASTY-HIP-NOLBERTO Right 11/10/2018    Performed by [facility]ir [facility]le MD at [facility] OR    GALLBLADDER SURGERY      HEMIARTHROPLASTY OF HIP Right 11/10/2018    Procedure: ARTHROPLASTY-HIP-NOLBERTO;  Surgeon: Nasir Danielle MD;  Location: [facility] OR;  Service: Orthopedics;  Laterality: Right;  Depuy Bipolar  LAteral positioner with positioners    HYSTERECTOMY       TONSILLECTOMY         Subjective   Pt with no verbalizations during session.  Chief Complaint: none stated  Patient/Family Comments/goals: for pt to get better    Pain/Comfort:  · Pain Rating 1: 0/10  · Pain Rating Post-Intervention 1: 0/10(no indication of pain during session)    Patients cultural, spiritual, Faith conflicts given the current situation: none stated    Objective:     Communicated with: RN prior to session.  Patient found supine in bed with daughter-in-law present in room and blood pressure cuff, pulse ox (continuous), telemetry, peripheral IV upon OT entry to room.    General Precautions: Standard, aspiration, fall, NPO   Orthopedic Precautions:RLE weight bearing as tolerated     Occupational Performance:    Bed Mobility:    · Patient completed Rolling/Turning to Left with  total assistance  · Patient completed Rolling/Turning to Right with total assistance  · Patient completed Scooting/Bridging with dependent drawsheet up HOB while supine    Activities of Daily Living:  · Grooming: dependence while supine    Cognitive/Visual Perceptual:  Cognitive/Psychosocial Skills:     -       Oriented to: pt with no response   -       Follows Commands/attention:followed no commands  -       Communication: expressive aphasia and receptive aphasia  -       Memory: HERMANN  -       Safety awareness/insight to disability: impaired   -       Mood/Affect/Coping skills/emotional control: Appropriate to situation  Visual/Perceptual:  Eyes open only minimally during session after rolling    Physical Exam:  Sensation: pt with no response to assessment  Dominant hand: right  Upper Extremity Range of Motion:  PROM BUE WFL; pt noted to move LUE spontaneously & right hand minimally spontaneously however not to command  Upper Extremity Strength: HERMANN due to pt poor following of command   Strength: WFL LUE (spontaneous however not to commands) did not perform with RUE  Fine Motor Coordination: HERMANN    [facility] 6 Click  "ADL:  AMPAC Total Score: 6    Treatment & Education:  No EOB performed due to RN reported restrictions (v-tach earlier in the morning). Provided education regarding role of OT and POC with family verbalizing understanding.  Pt's family had no further questions & when asked whether there were any concerns pt's family reported none.      Education:    Patient left supine with all lines intact, call button in reach, bed alarm on, RN notified, daughter-in-law present and white board updated.    Assessment:     Apple Watson is a 89 y.o. female with a medical diagnosis of Embolic stroke involving left carotid artery.  She presents with the following performance deficits affecting function: weakness, impaired endurance, impaired self care skills, impaired sensation, impaired functional mobilty, impaired balance, visual deficits, decreased coordination, impaired cognition, decreased upper extremity function, decreased lower extremity function, decreased safety awareness, abnormal tone, impaired coordination, impaired fine motor, decreased ROM, impaired cardiopulmonary response to activity.      Rehab Prognosis: limited currently; patient would benefit from acute skilled OT services to address these deficits and reach maximum level of function.         Clinical Decision Makin.  OT Mod:  "Pt evaluation falls under moderate complexity for evaluation coding due to identification of 3-5 performance deficits noted as stated above. Eval required Min/Mod assistance to complete on this date and detailed assessment(s) were utilized. Moreover, an expanded review of history and occupational profile obtained with additional review of cognitive, physical and psychosocial hx."     Plan:     Patient to be seen 3 x/week to address the above listed problems via self-care/home management, therapeutic activities, neuromuscular re-education, therapeutic exercises, cognitive retraining, sensory integration  · Plan of Care Expires: " 12/28/18  · Plan of Care Reviewed with: patient(2 daughter-in-laws)    This Plan of care has been discussed with the patient who was involved in its development and understands and is in agreement with the identified goals and treatment plan    GOALS:   Multidisciplinary Problems     Occupational Therapy Goals        Problem: Occupational Therapy Goal    Goal Priority Disciplines Outcome Interventions   Occupational Therapy Goal     OT, PT/OT Ongoing (interventions implemented as appropriate)    Description:  Goals to be met by: 12/7     Patient will increase functional independence with ADLs by performing:    UE Dressing with Maximum Assistance.  LE Dressing with Maximum Assistance.  Grooming while seated with Moderate Assistance.  Toileting from bedside commode with Maximum Assistance for hygiene and clothing management.   Sitting at edge of bed x15 minutes with Moderate Assistance.  Rolling to Bilateral with Moderate Assistance.   Supine to sit with Moderate Assistance.  Stand pivot transfers with Maximum Assistance.  Pt will follow 3/4 one step commands.  Pt will maintain eyes open 75% of session.                      Time Tracking:     OT Date of Treatment: 11/28/18  OT Start Time: 1050  OT Stop Time: 1104  OT Total Time (min): 14 min    Billable Minutes:Evaluation 14    RACHAEL Otoole  11/28/2018

## 2018-11-28 NOTE — PLAN OF CARE
11/28/18 1635   Post-Acute Status   Post-Acute Authorization Placement   Post-Acute Placement Status Patient List Provided     SW met with Pt son at bedside. Discussed therapy recs for rehab and provided list. Son reported she was at [facility] recently but he will discuss the list with his 2 siblings. They will give choices asap.    Laura Lorenzo LMSW  Neurocritical Care   [facility]  [id number]

## 2018-11-28 NOTE — ASSESSMENT & PLAN NOTE
-- Continue Neuro checks q 1hr  -- Vascular Neurology consulted  -- CTA multiphase pending  -- SBP goal <140  -- PT/OT/Speech  -- CXR  -- Electrolyte replace prn  -- Tici 2C reperfusion   -- 11/28 Mri stable

## 2018-11-28 NOTE — ASSESSMENT & PLAN NOTE
- CHADSVASC 6   - Recommend Metoprolol tart  25 bid  - If makes meaningful recovery from stroke, recommend long term OAC e.g. Eliquis

## 2018-11-28 NOTE — H&P
[facility]  Neurocritical Care  History & Physical    Admit Date: 11/27/2018  Service Date: 11/27/2018  Length of Stay: 0    Subjective:     Chief Complaint: Embolic stroke involving left carotid artery    History of Present Illness: Ms. Watson is a 89 year old female with a pmhx of Htn, New onset afib, and Neuropathy who presents to [facility] for a higher level of care for LMCA syndrome s/p TPA and LICA Thrombectomy. The patient initially presented to [facility] with severe right sided weakness and aphasia with a NIH stroke scale of 22. Tpa end time was 1642.     Past Medical History:   Diagnosis Date    Depression     Hypertension     Neuropathy      Past Surgical History:   Procedure Laterality Date    APPENDECTOMY      ARTHROPLASTY-HIP-NOLBERTO Right 11/10/2018    Performed by [facility]ir Danielle MD at [facility] OR    GALLBLADDER SURGERY      HEMIARTHROPLASTY OF HIP Right 11/10/2018    Procedure: ARTHROPLASTY-HIP-NOLBERTO;  Surgeon: Nasir Danielle MD;  Location: [facility] OR;  Service: Orthopedics;  Laterality: Right;  Depuy Bipolar  LAteral positioner with positioners    HYSTERECTOMY      TONSILLECTOMY        Current Facility-Administered Medications on File Prior to Encounter   Medication Dose Route Frequency Provider Last Rate Last Dose    [COMPLETED] alteplase (ACTIVASE) 100 mg injection 48 mg  0.81 mg/kg Intravenous ED 1 Time Benson Adams MD 48 mL/hr at 11/27/18 1542 48 mg at 11/27/18 1542    [COMPLETED] alteplase (ACTIVASE) 100 mg injection 5 mg  0.09 mg/kg Intravenous Once Benson Adams MD   Stopped at 11/27/18 1540     Current Outpatient Medications on File Prior to Encounter   Medication Sig Dispense Refill    amitriptyline (ELAVIL) 10 MG tablet Take 1 tablet (10 mg total) by mouth 2 (two) times daily. 180 tablet 3    calcium carb/vit D3/minerals (CALCIUM CARBONATE-VIT D3-MIN) 600 mg (1,500 mg)-400 unit Chew Take 1 tablet by mouth once daily. 30 each 3    cyanocobalamin  (VITAMIN B-12) 250 MCG tablet Take 250 mcg by mouth once daily.      ergocalciferol (VITAMIN D2) 50,000 unit Cap Take 50,000 Units by mouth every 7 days.      gabapentin (NEURONTIN) 300 MG capsule Take 1 capsule (300 mg total) by mouth every evening. 30 capsule 11    guaifenesin-codeine 100-10 mg/5 ml (TUSSI-ORGANIDIN NR)  mg/5 mL syrup Take 10 mLs by mouth every 4 (four) hours as needed for Cough. 240 mL 0    HYDROcodone-acetaminophen (NORCO)  mg per tablet Take 1 tablet by mouth every 6 (six) hours as needed for Pain. 8 tablet 0    verapamil (CALAN-SR) 240 MG CR tablet Take 1 tablet (240 mg total) by mouth once daily. 90 tablet 3    vitamin A 8000 UNIT capsule Take 8,000 Units by mouth once daily.        Allergies: Celebrex [celecoxib]; Cymbalta [duloxetine]; Iodine and iodide containing products; Lovastatin; Pneumococcal 23-gregorio ps vaccine; Sulpho-lac [sulfur]; Vioxx [rofecoxib]; and Savella [milnacipran]    History reviewed. No pertinent family history.  Social History     Tobacco Use    Smoking status: Never Smoker    Smokeless tobacco: Never Used   Substance Use Topics    Alcohol use: No    Drug use: No     Review of Systems   Unable to obtain due to aphasia  Objective:     Vitals:    Temp: 98.5 °F (36.9 °C)  Pulse: 110  BP: 132/83  MAP (mmHg): 101  Resp: (!) 26  SpO2: 99 %  O2 Device (Oxygen Therapy): nasal cannula    Temp  Min: 98.2 °F (36.8 °C)  Max: 98.5 °F (36.9 °C)  Pulse  Min: 68  Max: 119  BP  Min: 123/75  Max: 159/70  MAP (mmHg)  Min: 97  Max: 101  Resp  Min: 12  Max: 40  SpO2  Min: 94 %  Max: 100 %    No intake/output data recorded.           Physical Exam  GA: Alert, comfortable, no acute distress.   HEENT: No scleral icterus or JVD.   Pulmonary: Clear to auscultation A/L. No wheezing, crackles, or rhonchi.  Cardiac: RRR S1 & S2 w/o rubs/murmurs/gallops.   Abdominal: Bowel sounds present x 4. No appreciable hepatosplenomegaly.  Skin: No jaundice, rashes, or visible  lesions.  Neuro:  --GCS: E4 V1 M5  --Mental Status:  AA, not following commands, aphasic, moves left spontaneously, pain on right lower, and no movement on the RUE.  --CN II-XII grossly intact.   --Pupils 2mm, PERRL.   --Corneal reflex, gag, cough intact.  --LUE strength: 4/5  --LLE strength: 5/5  --RUE strength: 0/5  --RLE strength: 2/5      Today I personally reviewed pertinent medications, lines/drains/airways, imaging, cardiology results, laboratory results, microbiology results, notably:        Assessment/Plan:     Neuro   * Embolic stroke involving left carotid artery    -- Continue Neuro checks q 1hr  -- Vascular Neurology consulted  -- CTA multiphase pending  -- SBP goal <140  -- PT/OT/Speech  -- CXR  -- Electrolyte replace prn  -- Tici 2C reperfusion        Cardiac/Vascular   Essential hypertension    -- Continue to monitor HR and BP   -- SBP goal < 140              Activity Orders          None        Full Code    Linus Carolina NP  Neurocritical Care  [facility]

## 2018-11-28 NOTE — PROGRESS NOTES
Patient arrived to [facility] from IR by by bed on cardiac monitor transported by RN x2    Type of stroke/diagnosis:  L MCA      TPA start:1440 and  end:1540    Thrombectomy start and end time: 1905     Current symptoms: globally aphasic, L gaze preference     Skin assessment done:yes  Wounds noted: L hip wound from recent hip surgery     NCC notified: AMALIA Carolina at bedside

## 2018-11-28 NOTE — SIGNIFICANT EVENT
Throughout the night the patient experienced several runs on unsustained VTach. (6beats-12beats). She was asymptomatic during these times. Nasal canula oxygen 3L in use with stable vital signs; HR Afib 105-120; -130. 2gm Mg replaced for low normal of 1.8. Metoprolol 2.5 IV given as well. EKG and cardiac enzymes drawn. Troponin I returned elevated from 0.12 to > 50. CPK as well as CPKMB and MB were also elevated. Cardiology consulted. Evaluation by Cardiologist completed at bedside along with ECHO. Recommendations were for no life saving interventions as was the patient's request (according to the daughter). Cardiology recommends continued use of metoprolol as well as anticoagulations. Patient remains less than 24 hours post TPA administration. No visible distress noted. Monitoring maintained Q1 hr with follow up lab draws.

## 2018-11-28 NOTE — PLAN OF CARE
Reviewed MRI 11/28/18, okay for Cardiology to use anticoagulation now as needed.         Hansel Donaldson MD  Stroke team

## 2018-11-28 NOTE — NURSING
RN informed NCC team pt had runs of Vtach lasted for few seconds around 10 beats in the printout; AM labs drawn earlier and sent to lab; pt is asleep and asymptomatic and no other unusualities noted; RN will continue to monitor

## 2018-11-28 NOTE — SUBJECTIVE & OBJECTIVE
Past Medical History:   Diagnosis Date    Atrial fibrillation with RVR 11/28/2018    Depression     Hypertension     Neuropathy      Past Surgical History:   Procedure Laterality Date    APPENDECTOMY      ARTHROPLASTY-HIP-NOLBERTO Right 11/10/2018    Performed by Nasir Danielle MD at [facility] OR    GALLBLADDER SURGERY      HEMIARTHROPLASTY OF HIP Right 11/10/2018    Procedure: ARTHROPLASTY-HIP-NOLBERTO;  Surgeon: Nasir Danielle MD;  Location: [facility] OR;  Service: Orthopedics;  Laterality: Right;  Depuy Bipolar  LAteral positioner with positioners    HYSTERECTOMY      TONSILLECTOMY        Current Facility-Administered Medications on File Prior to Encounter   Medication Dose Route Frequency Provider Last Rate Last Dose    [COMPLETED] alteplase (ACTIVASE) 100 mg injection 48 mg  0.81 mg/kg Intravenous ED 1 Time Benson Adams MD 48 mL/hr at 11/27/18 1542 48 mg at 11/27/18 1542    [COMPLETED] alteplase (ACTIVASE) 100 mg injection 5 mg  0.09 mg/kg Intravenous Once Benson Adams MD   Stopped at 11/27/18 1540     Current Outpatient Medications on File Prior to Encounter   Medication Sig Dispense Refill    amitriptyline (ELAVIL) 10 MG tablet Take 1 tablet (10 mg total) by mouth 2 (two) times daily. 180 tablet 3    calcium carb/vit D3/minerals (CALCIUM CARBONATE-VIT D3-MIN) 600 mg (1,500 mg)-400 unit Chew Take 1 tablet by mouth once daily. 30 each 3    cyanocobalamin (VITAMIN B-12) 250 MCG tablet Take 250 mcg by mouth once daily.      ergocalciferol (VITAMIN D2) 50,000 unit Cap Take 50,000 Units by mouth every 7 days.      gabapentin (NEURONTIN) 300 MG capsule Take 1 capsule (300 mg total) by mouth every evening. 30 capsule 11    guaifenesin-codeine 100-10 mg/5 ml (TUSSI-ORGANIDIN NR)  mg/5 mL syrup Take 10 mLs by mouth every 4 (four) hours as needed for Cough. 240 mL 0    HYDROcodone-acetaminophen (NORCO)  mg per tablet Take 1 tablet by mouth every 6 (six) hours as needed for Pain. 8 tablet 0     verapamil (CALAN-SR) 240 MG CR tablet Take 1 tablet (240 mg total) by mouth once daily. 90 tablet 3    vitamin A 8000 UNIT capsule Take 8,000 Units by mouth once daily.        Allergies: Celebrex [celecoxib]; Cymbalta [duloxetine]; Iodine and iodide containing products; Lovastatin; Pneumococcal 23-gregorio ps vaccine; Sulpho-lac [sulfur]; Vioxx [rofecoxib]; and Savella [milnacipran]    History reviewed. No pertinent family history.  Social History     Tobacco Use    Smoking status: Never Smoker    Smokeless tobacco: Never Used   Substance Use Topics    Alcohol use: No    Drug use: No     Review of Systems     Unable to obtain due to aphasia  Objective:     Vitals:    Temp: 99.1 °F (37.3 °C)  Pulse: 108  Rhythm: normal sinus rhythm  BP: 111/70  MAP (mmHg): 84  Resp: (!) 27  SpO2: 100 %  O2 Device (Oxygen Therapy): nasal cannula    Temp  Min: 97.9 °F (36.6 °C)  Max: 99.1 °F (37.3 °C)  Pulse  Min: 68  Max: 120  BP  Min: 106/60  Max: 159/70  MAP (mmHg)  Min: 77  Max: 113  Resp  Min: 13  Max: 40  SpO2  Min: 94 %  Max: 100 %    11/27 0701 - 11/28 0700  In: 904.4 [I.V.:904.4]  Out: 550 [Urine:550]   Unmeasured Output  Stool Occurrence: 0       Physical Exam    GA: Alert, comfortable, no acute distress.   HEENT: No scleral icterus or JVD.   Pulmonary: Clear to auscultation A/L. No wheezing, crackles, or rhonchi.  Cardiac: RRR S1 & S2 w/o rubs/murmurs/gallops.   Abdominal: Bowel sounds present x 4. No appreciable hepatosplenomegaly.  Skin: No jaundice, rashes, or visible lesions.  Neuro:  --GCS: E4 V1 M5  --Mental Status:  AA, not following commands, aphasic, moves left spontaneously, pain on right lower, and no movement on the RUE.  --CN II-XII grossly intact.   --Pupils 2mm, PERRL.   --Corneal reflex, gag, cough intact.  --LUE strength: 4/5  --LLE strength: 5/5  --RUE strength: 0/5  --RLE strength: 3/5      Today I personally reviewed pertinent medications, lines/drains/airways, imaging, cardiology results, laboratory  results, microbiology results, notably:

## 2018-11-28 NOTE — PROCEDURES
Radiology Post-Procedure Note    Pre Op Diagnosis: Stroke    Post Op Diagnosis: left carotid terminus occlusion    Procedure: Cerebral angiogram and mechanical thrombectomy    Procedure performed by: Jerod Mcclelland MD    Written Informed Consent Obtained: Yes    Specimen Removed: YES left ICA terminus occlusion    Estimated Blood Loss: less than 50     Procedure report:     An 8F sheath was placed into the right femoral artery and a 5F Robert catheter was advanced into the aortic arch.  The left CCA, ICA and MCA arteries were subselected and angiography of the brain was performed after injection into each of these vessels.    Preliminary interpretation: Left carotid terminus occlusion. Mechanical thrombectomy with restoration of TICI 2 c flow after 3 passes.  Please see Imaging report for full details.    A right femoral artery angiogram was performed, the sheath removed and hemostasis achieved using a perclose device.  No hematoma was present at the time of hemostasis.    The patient tolerated the procedure well.     Jerod Mcclelland MD  Department of Radiology  Pager: [phone]

## 2018-11-28 NOTE — PLAN OF CARE
Problem: Occupational Therapy Goal  Goal: Occupational Therapy Goal  Goals to be met by: 12/7     Patient will increase functional independence with ADLs by performing:    UE Dressing with Maximum Assistance.  LE Dressing with Maximum Assistance.  Grooming while seated with Moderate Assistance.  Toileting from bedside commode with Maximum Assistance for hygiene and clothing management.   Sitting at edge of bed x15 minutes with Moderate Assistance.  Rolling to Bilateral with Moderate Assistance.   Supine to sit with Moderate Assistance.  Stand pivot transfers with Maximum Assistance.  Pt will follow 3/4 one step commands.  Pt will maintain eyes open 75% of session.    Outcome: Ongoing (interventions implemented as appropriate)  OT eval completed.

## 2018-11-28 NOTE — PT/OT/SLP EVAL
Speech Language Pathology Evaluation  Cognitive/Bedside Swallow    Patient Name:  Apple Watson   MRN:  [id number]  Admitting Diagnosis: Embolic stroke involving left carotid artery    Recommendations:                  General Recommendations:  Dysphagia therapy and Speech/language therapy  Diet recommendations:  NPO, NPO   Aspiration Precautions: Alternate means of nutrition/hydration and Strict aspiration precautions   General Precautions: Standard, aspiration, aphasia, fall  Communication strategies:  go to room if call light pushed    History:     Past Medical History:   Diagnosis Date    Atrial fibrillation with RVR 11/28/2018    Depression     Hypertension     Neuropathy        Past Surgical History:   Procedure Laterality Date    APPENDECTOMY      ARTHROPLASTY-HIP-NOLBERTO Right 11/10/2018    Performed by Nasir Danielle MD at [facility] OR    GALLBLADDER SURGERY      HEMIARTHROPLASTY OF HIP Right 11/10/2018    Procedure: ARTHROPLASTY-HIP-NOLBERTO;  Surgeon: Nasir Danielle MD;  Location: [facility] OR;  Service: Orthopedics;  Laterality: Right;  Depuy Bipolar  LAteral positioner with positioners    HYSTERECTOMY      TONSILLECTOMY         Social History: Patient lives alone and fairly independent pta.  She has 4 children. Pt reportedly stopped driving nearly 3 months ago.    Prior diet: Regular/thin per family      Subjective     nonverbal    Pain/Comfort:  · Pain Rating 1: 0/10  · Pain Rating Post-Intervention 1: 0/10    Objective:     Cognitive Status:    Arousal/Alertness - lethargic, but able to open eyes to stimulation  Attention - reduced  Memory - unable to test secondary to aphasia  Problem Solving- unable to text secondary to aphasia     Receptive Language:   Pt followed only 1 simple command.  She did not respond to any simple y/n questions.     Expressive Language:  Verbal:    Severe deficits noted.  Pt unable to elicit any clear vocalization or verbalization to a variety of expressive language tasks  attempted.      Motor Speech:  Unable to test secondary inability to elicit vocalization/verbalization.    Voice:   Unable to test secondary to inability to elicit vocalizations.    Visual-Spatial:  TBA    Reading:   TBA    Written Expression:   TBA    Oral Musculature Evaluation  · Oral Musculature: unable to assess due to poor participation/comprehension  · Dentition: present and adequate  · Volitional Cough: very weak/minimal throat clear elicited  · Volitional Swallow: unable to elicit  · Voice Prior to PO Intake: unable to elicit    Bedside Swallow Eval:   Consistencies Assessed:  · Thin liquids (ice chips x2)     Oral Phase:   · Anterior loss  · Slow oral transit time    Pharyngeal Phase:   · decreased hyolaryngeal excursion to palpation  · delayed swallow initation  · multiple spontaneous swallows   · Pt triggered delayed swallows x3 with first ice chip.  On second ice chip, pt required over 1 minute to trigger swallow.  Detected wet breath sounds. Pt unable to elicit cough or voice on command post trials.  No further boluses presented secondary to lethargy and significant delay resulting in high aspiration risk.     Treatment: Education provided to pt/family regarding ST role, deficits, aspiration risk, npo recs, ngoing assessment of swallow and poc. Pt unable to verbalize understanding.  Family present verbalizing understanding.   Whiteboard updated.   Results/recs reviewed with nursing.     Assessment:     Apple Watson is a 89 y.o. female with an SLP diagnosis of Aphasia and Dysphagia.     Goals:   Multidisciplinary Problems     SLP Goals        Problem: SLP Goal    Goal Priority Disciplines Outcome   SLP Goal     SLP    Description:  Speech Language Pathology Goals  Goals expected to be met by 12/5  1. Ongoing swallow assessment.   2. Pt will elicit verbalization x3 to automatic speech tasks.   3. Pt will follow simple commands x3.  4. Pt will respond via any modality to simple y/n questions.   5.  Ongoing cognitive /linguistic assessment as appropriate.                        Plan:     · Patient to be seen:  5 x/week   · Plan of Care expires:  12/27/18  · Plan of Care reviewed with:  patient, daughter, son   · SLP Follow-Up:  Yes       Discharge recommendations:  Discharge Facility/Level Of Care Needs: (TBD pending pt progress and any PT/OT recs)       Time Tracking:     SLP Treatment Date:   11/28/18  Speech Start Time:  0941  Speech Stop Time:  1000     Speech Total Time (min):  19 min    Billable Minutes: Eval 11  and Eval Swallow and Oral Function 8    SAMY Finney, CCC-SLP  Speech Language Pathologist  [phone]  11/28/2018

## 2018-11-29 LAB
ALBUMIN SERPL BCP-MCNC: 2.7 G/DL
ALP SERPL-CCNC: 152 U/L
ALT SERPL W/O P-5'-P-CCNC: 34 U/L
ANION GAP SERPL CALC-SCNC: 8 MMOL/L
APTT BLDCRRT: 43 SEC
APTT BLDCRRT: 53.5 SEC
AST SERPL-CCNC: 145 U/L
BASOPHILS # BLD AUTO: 0.07 K/UL
BASOPHILS # BLD AUTO: 0.07 K/UL
BASOPHILS NFR BLD: 0.5 %
BASOPHILS NFR BLD: 0.5 %
BILIRUB SERPL-MCNC: 0.5 MG/DL
BUN SERPL-MCNC: 27 MG/DL
CALCIUM SERPL-MCNC: 8.4 MG/DL
CHLORIDE SERPL-SCNC: 107 MMOL/L
CO2 SERPL-SCNC: 26 MMOL/L
CREAT SERPL-MCNC: 0.7 MG/DL
DIFFERENTIAL METHOD: ABNORMAL
DIFFERENTIAL METHOD: ABNORMAL
EOSINOPHIL # BLD AUTO: 0 K/UL
EOSINOPHIL # BLD AUTO: 0 K/UL
EOSINOPHIL NFR BLD: 0.1 %
EOSINOPHIL NFR BLD: 0.1 %
ERYTHROCYTE [DISTWIDTH] IN BLOOD BY AUTOMATED COUNT: 13.2 %
ERYTHROCYTE [DISTWIDTH] IN BLOOD BY AUTOMATED COUNT: 13.2 %
EST. GFR  (AFRICAN AMERICAN): >60 ML/MIN/1.73 M^2
EST. GFR  (NON AFRICAN AMERICAN): >60 ML/MIN/1.73 M^2
GLUCOSE SERPL-MCNC: 115 MG/DL
HCT VFR BLD AUTO: 32 %
HCT VFR BLD AUTO: 32 %
HGB BLD-MCNC: 9.9 G/DL
HGB BLD-MCNC: 9.9 G/DL
IMM GRANULOCYTES # BLD AUTO: 0.06 K/UL
IMM GRANULOCYTES # BLD AUTO: 0.06 K/UL
IMM GRANULOCYTES NFR BLD AUTO: 0.4 %
IMM GRANULOCYTES NFR BLD AUTO: 0.4 %
INR PPP: 1.1
INR PPP: 1.2
LYMPHOCYTES # BLD AUTO: 1.3 K/UL
LYMPHOCYTES # BLD AUTO: 1.3 K/UL
LYMPHOCYTES NFR BLD: 9 %
LYMPHOCYTES NFR BLD: 9 %
MAGNESIUM SERPL-MCNC: 2.7 MG/DL
MCH RBC QN AUTO: 29.4 PG
MCH RBC QN AUTO: 29.4 PG
MCHC RBC AUTO-ENTMCNC: 30.9 G/DL
MCHC RBC AUTO-ENTMCNC: 30.9 G/DL
MCV RBC AUTO: 95 FL
MCV RBC AUTO: 95 FL
MONOCYTES # BLD AUTO: 1.2 K/UL
MONOCYTES # BLD AUTO: 1.2 K/UL
MONOCYTES NFR BLD: 8.1 %
MONOCYTES NFR BLD: 8.1 %
NEUTROPHILS # BLD AUTO: 11.6 K/UL
NEUTROPHILS # BLD AUTO: 11.6 K/UL
NEUTROPHILS NFR BLD: 81.9 %
NEUTROPHILS NFR BLD: 81.9 %
NRBC BLD-RTO: 0 /100 WBC
NRBC BLD-RTO: 0 /100 WBC
PHOSPHATE SERPL-MCNC: 3.7 MG/DL
PLATELET # BLD AUTO: 432 K/UL
PLATELET # BLD AUTO: 432 K/UL
PMV BLD AUTO: 9.4 FL
PMV BLD AUTO: 9.4 FL
POCT GLUCOSE: 110 MG/DL (ref 70–110)
POCT GLUCOSE: 119 MG/DL (ref 70–110)
POTASSIUM SERPL-SCNC: 4.2 MMOL/L
PROT SERPL-MCNC: 5.7 G/DL
PROTHROMBIN TIME: 11.2 SEC
PROTHROMBIN TIME: 12.2 SEC
RBC # BLD AUTO: 3.37 M/UL
RBC # BLD AUTO: 3.37 M/UL
SODIUM SERPL-SCNC: 141 MMOL/L
WBC # BLD AUTO: 14.21 K/UL
WBC # BLD AUTO: 14.21 K/UL

## 2018-11-29 PROCEDURE — 85730 THROMBOPLASTIN TIME PARTIAL: CPT | Mod: 91

## 2018-11-29 PROCEDURE — 83735 ASSAY OF MAGNESIUM: CPT

## 2018-11-29 PROCEDURE — 97110 THERAPEUTIC EXERCISES: CPT

## 2018-11-29 PROCEDURE — 25000003 PHARM REV CODE 250: Performed by: PHYSICIAN ASSISTANT

## 2018-11-29 PROCEDURE — 51798 US URINE CAPACITY MEASURE: CPT

## 2018-11-29 PROCEDURE — 51701 INSERT BLADDER CATHETER: CPT

## 2018-11-29 PROCEDURE — 99233 SBSQ HOSP IP/OBS HIGH 50: CPT | Mod: ,,, | Performed by: NURSE PRACTITIONER

## 2018-11-29 PROCEDURE — 20000000 HC ICU ROOM

## 2018-11-29 PROCEDURE — 85730 THROMBOPLASTIN TIME PARTIAL: CPT

## 2018-11-29 PROCEDURE — 85610 PROTHROMBIN TIME: CPT | Mod: 91

## 2018-11-29 PROCEDURE — 85025 COMPLETE CBC W/AUTO DIFF WBC: CPT

## 2018-11-29 PROCEDURE — 97163 PT EVAL HIGH COMPLEX 45 MIN: CPT

## 2018-11-29 PROCEDURE — 80053 COMPREHEN METABOLIC PANEL: CPT

## 2018-11-29 PROCEDURE — 97530 THERAPEUTIC ACTIVITIES: CPT

## 2018-11-29 PROCEDURE — 25000003 PHARM REV CODE 250: Performed by: NURSE PRACTITIONER

## 2018-11-29 PROCEDURE — 27000221 HC OXYGEN, UP TO 24 HOURS

## 2018-11-29 PROCEDURE — 92526 ORAL FUNCTION THERAPY: CPT

## 2018-11-29 PROCEDURE — 85610 PROTHROMBIN TIME: CPT

## 2018-11-29 PROCEDURE — 92507 TX SP LANG VOICE COMM INDIV: CPT

## 2018-11-29 PROCEDURE — 94761 N-INVAS EAR/PLS OXIMETRY MLT: CPT

## 2018-11-29 PROCEDURE — 84100 ASSAY OF PHOSPHORUS: CPT

## 2018-11-29 PROCEDURE — 99233 SBSQ HOSP IP/OBS HIGH 50: CPT | Mod: GC,,, | Performed by: PSYCHIATRY & NEUROLOGY

## 2018-11-29 PROCEDURE — A4216 STERILE WATER/SALINE, 10 ML: HCPCS | Performed by: NURSE PRACTITIONER

## 2018-11-29 RX ORDER — METOPROLOL TARTRATE 25 MG/1
25 TABLET, FILM COATED ORAL 4 TIMES DAILY
Status: DISCONTINUED | OUTPATIENT
Start: 2018-11-29 | End: 2018-11-30

## 2018-11-29 RX ADMIN — METOPROLOL TARTRATE 25 MG: 25 TABLET, FILM COATED ORAL at 09:11

## 2018-11-29 RX ADMIN — Medication 3 ML: at 09:11

## 2018-11-29 RX ADMIN — METOPROLOL TARTRATE 25 MG: 25 TABLET ORAL at 10:11

## 2018-11-29 RX ADMIN — Medication 3 ML: at 03:11

## 2018-11-29 RX ADMIN — ATORVASTATIN CALCIUM 40 MG: 20 TABLET, FILM COATED ORAL at 10:11

## 2018-11-29 RX ADMIN — STANDARDIZED SENNA CONCENTRATE AND DOCUSATE SODIUM 2 TABLET: 8.6; 5 TABLET, FILM COATED ORAL at 10:11

## 2018-11-29 RX ADMIN — STANDARDIZED SENNA CONCENTRATE AND DOCUSATE SODIUM 2 TABLET: 8.6; 5 TABLET, FILM COATED ORAL at 09:11

## 2018-11-29 RX ADMIN — METOPROLOL TARTRATE 25 MG: 25 TABLET ORAL at 04:11

## 2018-11-29 RX ADMIN — Medication 3 ML: at 05:11

## 2018-11-29 NOTE — PLAN OF CARE
SW attempted to complete the LOCET but she already has an active LOCET on file from November 13. It is valid for one month. Sent PASRR to the state to obtain the 142.    Laura Lorenzo LMSW  Neurocritical Care   [facility]  [id number]

## 2018-11-29 NOTE — PLAN OF CARE
Problem: SLP Goal  Goal: SLP Goal  Speech Language Pathology Goals  Goals expected to be met by 12/5  1. Ongoing swallow assessment.   2. Pt will elicit verbalization x3 to automatic speech tasks.   3. Pt will follow simple commands x3.  4. Pt will respond via any modality to simple y/n questions.   5. Ongoing cognitive /linguistic assessment as appropriate.       Goals remain appropriate.   Emily P. Abadie M.S., CCC-SLP  Speech Language Pathologist  [phone]  11/29/2018

## 2018-11-29 NOTE — PT/OT/SLP PROGRESS
Speech Language Pathology Treatment    Patient Name:  Apple Watson   MRN:  [id number]  Admitting Diagnosis: Embolic stroke involving left carotid artery    Recommendations:                 General Recommendations:  Dysphagia therapy and Speech/language therapy  Diet recommendations:  NPO, NPO   Aspiration Precautions: Alternate means of nutrition/hydration and Strict aspiration precautions   General Precautions: Standard, aspiration, aphasia, fall  Communication strategies:  go to room if call light pushed                Subjective     Family members present; nonverbal.     Pain/Comfort:  · Pain Rating 1: 0/10    Objective:     Has the patient been evaluated by SLP for swallowing?   Yes  Keep patient NPO? Yes   Current Respiratory Status: nasal cannula      Patient seen for ongoing dysphagia / cognitive linguistic therapy. Patient with poor tolerance of ice chip trial x1 which revealed oral stasis, anterior spillage, and absent pharyngeal swallow. Suctioning provided for full oral cavity clearance. No further PO trials attempted this date. Patient did not repeat vowels via max A provided.  No response elicited via any modality during simple yes/no tasks despite max A.  Patient followed basic single step commands with 90% acc given direct model by ST. Skilled education was provided to patient and family member regarding diet recs, aspiration precautions, and ongoing ST plan of care. Patient with no evidence of learning, family member verbalized understanding. Patient left in room with family member at bedside.     Assessment:     Apple Watson is a 89 y.o. female with an SLP diagnosis of Aphasia and Dysphagia.      Goals:   Multidisciplinary Problems     SLP Goals        Problem: SLP Goal    Goal Priority Disciplines Outcome   SLP Goal     SLP    Description:  Speech Language Pathology Goals  Goals expected to be met by 12/5  1. Ongoing swallow assessment.   2. Pt will elicit verbalization x3 to automatic speech  tasks.   3. Pt will follow simple commands x3.  4. Pt will respond via any modality to simple y/n questions.   5. Ongoing cognitive /linguistic assessment as appropriate.                        Plan:     · Patient to be seen:  4 x/week   · Plan of Care expires:  12/27/18  · Plan of Care reviewed with:  patient, family   · SLP Follow-Up:  Yes       Discharge recommendations:  nursing facility, skilled   Barriers to Discharge:  None    Time Tracking:     SLP Treatment Date:   11/29/18  Speech Start Time:  1045  Speech Stop Time:  1101     Speech Total Time (min):  16 min    Billable Minutes: Speech Therapy Individual 8 and Treatment Swallowing Dysfunction 8    Emily Abadie, CCC-SLP  11/29/2018

## 2018-11-29 NOTE — SUBJECTIVE & OBJECTIVE
Interval History: patient remains aphasic.    Review of Systems   Unable to perform ROS: patient nonverbal     Objective:     Vital Signs (Most Recent):  Temp: 98.2 °F (36.8 °C) (11/29/18 1136)  Pulse: (!) 120 (11/29/18 0802)  Resp: (!) 27 (11/29/18 0802)  BP: 119/83 (11/29/18 0802)  SpO2: 100 % (11/29/18 0802) Vital Signs (24h Range):  Temp:  [97.5 °F (36.4 °C)-99.1 °F (37.3 °C)] 98.2 °F (36.8 °C)  Pulse:  [] 120  Resp:  [20-30] 27  SpO2:  [98 %-100 %] 100 %  BP: (102-152)/(67-89) 119/83     Weight: 63.5 kg (139 lb 15.9 oz)  Body mass index is 28.6 kg/m².     SpO2: 100 %  O2 Device (Oxygen Therapy): nasal cannula w/ humidification      Intake/Output Summary (Last 24 hours) at 11/29/2018 1208  Last data filed at 11/29/2018 0900  Gross per 24 hour   Intake 200.34 ml   Output 650 ml   Net -449.66 ml       Lines/Drains/Airways     Drain                 NG/OG Tube 11/28/18 1200 Right nostril 1 day    Female External Urinary Catheter 11/27/18 2100 1 day          Peripheral Intravenous Line                 Peripheral IV - Single Lumen 11/27/18 1509 Left Forearm 1 day         Peripheral IV - Single Lumen 11/27/18 1520 Right Forearm 1 day         Peripheral IV - Single Lumen 11/29/18 0243 Left;Posterior Hand less than 1 day                Physical Exam   Constitutional: Vital signs are normal. She appears well-developed and well-nourished. She is cooperative.  Non-toxic appearance. No distress.   HENT:   Head: Normocephalic and atraumatic.   Neck: No hepatojugular reflux and no JVD present. Carotid bruit is not present.   Cardiovascular: S1 normal and S2 normal. An irregularly irregular rhythm present. Tachycardia present. Exam reveals no gallop.   Murmur heard.  High-pitched blowing holosystolic murmur is present with a grade of 3/6 at the apex.  Pulses:       Dorsalis pedis pulses are 2+ on the right side, and 2+ on the left side.        Posterior tibial pulses are 2+ on the right side, and 2+ on the left side.   No  lower extremity edema   Pulmonary/Chest: Effort normal and breath sounds normal. No respiratory distress. She has no decreased breath sounds. She has no wheezes. She has no rhonchi. She has no rales.   Abdominal: Soft. Normal appearance and bowel sounds are normal. She exhibits no distension and no mass. There is no tenderness.   Neurological: She is alert.   Skin: Skin is warm, dry and intact.       Significant Labs:   CMP   Recent Labs   Lab 11/27/18  1509 11/28/18  0100 11/29/18  0110    141 141   K 3.9 4.2 4.2    106 107   CO2 29 25 26   * 126* 115*   BUN 26* 25* 27*   CREATININE 0.69 0.7 0.7   CALCIUM 8.5* 8.4* 8.4*   PROT 6.6 5.5* 5.7*   ALBUMIN 3.4* 2.6* 2.7*   BILITOT 0.6 0.6 0.5   ALKPHOS 133* 164* 152*   AST 28 152* 145*   ALT 12 31 34   ANIONGAP 7* 10 8   ESTGFRAFRICA >60.0 >60.0 >60.0   EGFRNONAA >60.0 >60.0 >60.0    and CBC   Recent Labs   Lab 11/28/18  0100 11/28/18  1903 11/29/18  0110   WBC 13.54* 13.34* 14.21*  14.21*   HGB 9.9* 9.8* 9.9*  9.9*   HCT 31.8* 31.8* 32.0*  32.0*   * 470* 432*  432*       Significant Imaging:     EKG (11/28/2018): Atrial Fibrillation with . Incomplete LBBB with a previous anteroseptal infarct    2D Echo (11/28/2018):  Left Ventricle Mild decreased ejection fraction at 45%. Cavity is normal. Normal wall thickness observed. Abnormal septal motion. Left ventricular diastolic dysfunction.   Right Ventricle Normal cavity size and wall thickness. Wall motion normal.   Left Atrium Severe atrial enlargement.   Right Atrium Mild atrial enlargement.   Aortic Valve The valve is trileaflet. Aortic valve sclerosis is mild. Mobility is normal   Mitral Valve Normal valve structure. Moderate to severe regurgitation. The jet is posterior directed and is eccentric. Normal leaflet mobility.   Tricuspid Valve The tricuspid valve is normal. Moderate to severe regurgitation. Pulmonary hypertension present. Mobility is normalThe estimated PA systolic  pressure is 45.77 mmHg   Pulmonic Valve Normal valve structure. Mobility is normal.   IVC/SVC Normal central venous pressure (3 mm Hg).   Ascending Aorta Normal aortic root, size and contour.   Pericardium No pericardial effusion.

## 2018-11-29 NOTE — PT/OT/SLP PROGRESS
Occupational Therapy   Treatment    Name: Apple Watson  MRN: [id number]  Admitting Diagnosis:  Embolic stroke involving left carotid artery       Recommendations:     Discharge Recommendations: nursing facility, skilled  Discharge Equipment Recommendations:  (TBD)  Barriers to discharge:  None    Subjective   Pt with no verbalizations & with occasional head nods to questions during session.  Pain/Comfort:  · Pain Rating 1: 0/10  · Pain Rating Post-Intervention 1: 0/10(no indication of pain during session)    Objective:     Communicated with: RN prior to session.  Patient found supine in bed with daughter present in room and blood pressure cuff, pulse ox (continuous), telemetry, SCD, pressure relief boots, peripheral IV, PureWick, NG tube upon OT entry to room.    General Precautions: Standard, aspiration, NPO, fall   Orthopedic Precautions:RLE weight bearing as tolerated     Occupational Performance:    Bed Mobility:    · Patient completed Rolling/Turning to Left with  maximal assistance  · Patient completed Rolling/Turning to Right with maximal assistance  · Patient completed Scooting/Bridging with total assistance drawsheet transfer up [facility] while supine      [facility] 6 Click ADL: 6    Treatment & Education:  RN requested in bed session only on this date.  Pt with eyes open 80% of session.  Pt with inconsistent nodding of head yes/no to questions during session.  Pt's HR ranged between 80's-120's during session.  Pt performed AA/PROM to RUE & RLE & AAROM to LUE in all available planes x 10 reps each while supine.  Provided education to pt & pt's daughter regarding POC, role of OT, & discharge recommendations & reasons for recommendations. Provided daily orientation to pt. Pt's daughter had no further questions & when asked whether there were any concerns pt's daughter reported none.      Patient left supine with all lines intact, call button in reach, bed alarm on, RN notified, daughter present and white board  updated.  Education:    Assessment:     Apple Watson is a 89 y.o. female with a medical diagnosis of Embolic stroke involving left carotid artery.  She presents with the following performance deficits affecting function are weakness, impaired endurance, impaired sensation, impaired functional mobilty, impaired self care skills, impaired balance, visual deficits, impaired cognition, decreased lower extremity function, decreased coordination, decreased upper extremity function, decreased safety awareness, decreased ROM, impaired coordination, abnormal tone, impaired fine motor, impaired cardiopulmonary response to activity.  LImited session on this date however plan for EOB at next session.  Pt continues to require (A) with all activities.    Rehab Prognosis:  Limited currently; patient would benefit from acute skilled OT services to address these deficits and reach maximum level of function.       Plan:     Patient to be seen 4 x/week to address the above listed problems via self-care/home management, therapeutic activities, therapeutic exercises, neuromuscular re-education, cognitive retraining, sensory integration  · Plan of Care Expires: 12/28/18  · Plan of Care Reviewed with: patient, daughter    This Plan of care has been discussed with the patient who was involved in its development and understands and is in agreement with the identified goals and treatment plan    GOALS:   Multidisciplinary Problems     Occupational Therapy Goals        Problem: Occupational Therapy Goal    Goal Priority Disciplines Outcome Interventions   Occupational Therapy Goal     OT, PT/OT Ongoing (interventions implemented as appropriate)    Description:  Goals to be met by: 12/7     Patient will increase functional independence with ADLs by performing:    UE Dressing with Maximum Assistance.  LE Dressing with Maximum Assistance.  Grooming while seated with Moderate Assistance.  Toileting from bedside commode with Maximum Assistance  for hygiene and clothing management.   Sitting at edge of bed x15 minutes with Moderate Assistance.  Rolling to Bilateral with Moderate Assistance.   Supine to sit with Moderate Assistance.  Stand pivot transfers with Maximum Assistance.  Pt will follow 3/4 one step commands.  Pt will maintain eyes open 75% of session.                      Time Tracking:     OT Date of Treatment: 11/29/18  OT Start Time: 1147  OT Stop Time: 1217  OT Total Time (min): 30 min    Billable Minutes:Therapeutic Activity 10  Therapeutic Exercise 20    RACHAEL Otoole  11/29/2018

## 2018-11-29 NOTE — ASSESSMENT & PLAN NOTE
First noted on EKG post hip replacement surgery, 11/11/18, No anticoagulation was initiated at that time.   A fib noted on monitor on arrival in ED, EKG obtained confirmed afib RVR  Will need to discuss secondary stroke prevention with patient and family    Atrial Fibrillation Assessment ([id number]):       Condition Points    C   Congestive heart failure (or Left ventricular systolic dysfunction) 0    H Hypertension: blood pressure consistently above 140/90 mmHg (or treated hypertension on medication) 1    A2  Age ?75 years 2    D  Diabetes Mellitus 0    S2  Prior Stroke or TIA or Thromboembolism  2    V  Vascular disease (e.g. peripheral artery disease, myocardial infarction, aortic plaque) ?    A  Age 65-74 years 0    Sc  Sex category (i.e. female sex) 1                                                                                  [id number] Score: 6  [id number] Score Stroke Risk %   6 9.8                                                                                  Annual Stroke Risk:  9.8%    Score Risk Anticoagulation Therapy Considerations   2 or greater High Oral anticoagulant Oral anticoagulant, using either a new oral anticoagulant drug   (apixaban, rivaroxaban or dabigatran) or well controlled Warfarin at INR 2.0-3.0             Recommendation:     -[id number] score of 6= 9.% risk.    -If transition to warfarin, would likely need heparin bridge   -Would begin patient on AC prior to discharge, if no other contraindication.    -Rate control with Metoprolol tartrate while inpatient and transition to 24h metoprolol succinate prior to d/c.    -Cardiology following

## 2018-11-29 NOTE — PLAN OF CARE
Problem: Patient Care Overview  Goal: Plan of Care Review  Outcome: Ongoing (interventions implemented as appropriate)  POC reviewed with pt and family at 1400. Pt verbalized understanding. Questions and concerns addressed. No acute events today. Neuro exam consistently the same throughout the day. Pt within defined cardiac limits. Pt awaiting diet advancement. CT scan scheduled. Pt progressing toward goals. Will continue to monitor. See flowsheets for full assessment and VS info.

## 2018-11-29 NOTE — PLAN OF CARE
SW met with Pt sister at bedside. Discussed SNF list provided and introduced self. They have not decided on the SNF choice yet. They should have a joint decision soon.    Laura Lorenzo, SHRUTI  Neurocritical Care   [facility]  [id number]

## 2018-11-29 NOTE — PROGRESS NOTES
[facility]  Cardiology  Progress Note    Patient Name: Apple Watson  MRN: [id number]  Admission Date: 11/27/2018  Hospital Length of Stay: 2 days  Code Status: Full Code   Attending Physician: Mc Carrasquillo MD   Primary Care Physician: Tree Rolon MD  Expected Discharge Date:   Principal Problem:Embolic stroke involving left carotid artery    Subjective:     Hospital Course:   No notes on file    Interval History: patient remains aphasic.    Review of Systems   Unable to perform ROS: patient nonverbal     Objective:     Vital Signs (Most Recent):  Temp: 98.2 °F (36.8 °C) (11/29/18 1136)  Pulse: (!) 120 (11/29/18 0802)  Resp: (!) 27 (11/29/18 0802)  BP: 119/83 (11/29/18 0802)  SpO2: 100 % (11/29/18 0802) Vital Signs (24h Range):  Temp:  [97.5 °F (36.4 °C)-99.1 °F (37.3 °C)] 98.2 °F (36.8 °C)  Pulse:  [] 120  Resp:  [20-30] 27  SpO2:  [98 %-100 %] 100 %  BP: (102-152)/(67-89) 119/83     Weight: 63.5 kg (139 lb 15.9 oz)  Body mass index is 28.6 kg/m².     SpO2: 100 %  O2 Device (Oxygen Therapy): nasal cannula w/ humidification      Intake/Output Summary (Last 24 hours) at 11/29/2018 1208  Last data filed at 11/29/2018 0900  Gross per 24 hour   Intake 200.34 ml   Output 650 ml   Net -449.66 ml       Lines/Drains/Airways     Drain                 NG/OG Tube 11/28/18 1200 Right nostril 1 day    Female External Urinary Catheter 11/27/18 2100 1 day          Peripheral Intravenous Line                 Peripheral IV - Single Lumen 11/27/18 1509 Left Forearm 1 day         Peripheral IV - Single Lumen 11/27/18 1520 Right Forearm 1 day         Peripheral IV - Single Lumen 11/29/18 0243 Left;Posterior Hand less than 1 day                Physical Exam   Constitutional: Vital signs are normal. She appears well-developed and well-nourished. She is cooperative.  Non-toxic appearance. No distress.   HENT:   Head: Normocephalic and atraumatic.   Neck: No hepatojugular reflux and no JVD present.  Carotid bruit is not present.   Cardiovascular: S1 normal and S2 normal. An irregularly irregular rhythm present. Tachycardia present. Exam reveals no gallop.   Murmur heard.  High-pitched blowing holosystolic murmur is present with a grade of 3/6 at the apex.  Pulses:       Dorsalis pedis pulses are 2+ on the right side, and 2+ on the left side.        Posterior tibial pulses are 2+ on the right side, and 2+ on the left side.   No lower extremity edema   Pulmonary/Chest: Effort normal and breath sounds normal. No respiratory distress. She has no decreased breath sounds. She has no wheezes. She has no rhonchi. She has no rales.   Abdominal: Soft. Normal appearance and bowel sounds are normal. She exhibits no distension and no mass. There is no tenderness.   Neurological: She is alert.   Skin: Skin is warm, dry and intact.       Significant Labs:   CMP   Recent Labs   Lab 11/27/18  1509 11/28/18  0100 11/29/18  0110    141 141   K 3.9 4.2 4.2    106 107   CO2 29 25 26   * 126* 115*   BUN 26* 25* 27*   CREATININE 0.69 0.7 0.7   CALCIUM 8.5* 8.4* 8.4*   PROT 6.6 5.5* 5.7*   ALBUMIN 3.4* 2.6* 2.7*   BILITOT 0.6 0.6 0.5   ALKPHOS 133* 164* 152*   AST 28 152* 145*   ALT 12 31 34   ANIONGAP 7* 10 8   ESTGFRAFRICA >60.0 >60.0 >60.0   EGFRNONAA >60.0 >60.0 >60.0    and CBC   Recent Labs   Lab 11/28/18  0100 11/28/18  1903 11/29/18  0110   WBC 13.54* 13.34* 14.21*  14.21*   HGB 9.9* 9.8* 9.9*  9.9*   HCT 31.8* 31.8* 32.0*  32.0*   * 470* 432*  432*       Significant Imaging:     EKG (11/28/2018): Atrial Fibrillation with . Incomplete LBBB with a previous anteroseptal infarct    2D Echo (11/28/2018):  Left Ventricle Mild decreased ejection fraction at 45%. Cavity is normal. Normal wall thickness observed. Abnormal septal motion. Left ventricular diastolic dysfunction.   Right Ventricle Normal cavity size and wall thickness. Wall motion normal.   Left Atrium Severe atrial enlargement.   Right  Atrium Mild atrial enlargement.   Aortic Valve The valve is trileaflet. Aortic valve sclerosis is mild. Mobility is normal   Mitral Valve Normal valve structure. Moderate to severe regurgitation. The jet is posterior directed and is eccentric. Normal leaflet mobility.   Tricuspid Valve The tricuspid valve is normal. Moderate to severe regurgitation. Pulmonary hypertension present. Mobility is normalThe estimated PA systolic pressure is 45.77 mmHg   Pulmonic Valve Normal valve structure. Mobility is normal.   IVC/SVC Normal central venous pressure (3 mm Hg).   Ascending Aorta Normal aortic root, size and contour.   Pericardium No pericardial effusion.         Assessment and Plan:     Atrial fibrillation    - CHADSVASC 6   - Recommend Metoprolol tart  25 bid  - If makes meaningful recovery from stroke, recommend long term OAC e.g. Eliquis     Elevated troponin          NSTEMI (non-ST elevated myocardial infarction)    Elevated troponin likely to be sequelae of acute coronary syndrome (unclear if the result of embolic phenomena from atrial thrombus in the setting of her acute CVA and active atrial fib) with markedly elevated tropnins, transient EKG with brief ST elevations, and unable to communicate chest pain, in the setting of possible supply/demand ischemia 2 weeks ago.      - Patient would benefit from therapy for ACS with ASA, Plavix 75mg qday and Heparin gtt for 48hrs, when cleared by neuro-teams  - today will be the first day of lopressor 25 TID dosing. Will monitor response to TID dosing.  - continue anticoagulation as outpatient for high risk AF (MBAWA2M = 5) with high likelihood of atrial thrombus  - based off of echo, likely had embolus of clot to distal LAD and the stroke at the same time.  - would not recommend ischemic workup in the acute setting. Follow up with cardiology in the outpatient setting and determine need for evaluation at that time.         VTE Risk Mitigation (From admission, onward)         Ordered     heparin 25,000 units in dextrose 5% 250 ml (100 units/mL) infusion MINIMAL INTENSITY nomogram - [facility]  Continuous      11/28/18 1857     Reason for No Pharmacological VTE Prophylaxis  Once      11/27/18 1743     IP VTE HIGH RISK PATIENT  Once      11/27/18 1743     Place sequential compression device  Until discontinued      11/27/18 1743          Laura Moore MD  Cardiology  [facility]

## 2018-11-29 NOTE — SUBJECTIVE & OBJECTIVE
Past Medical History:   Diagnosis Date    Atrial fibrillation with RVR 11/28/2018    Depression     Hypertension     Neuropathy      Past Surgical History:   Procedure Laterality Date    APPENDECTOMY      ARTHROPLASTY-HIP-NOLBERTO Right 11/10/2018    Performed by Nasir Danielle MD at [facility] OR    GALLBLADDER SURGERY      HEMIARTHROPLASTY OF HIP Right 11/10/2018    Procedure: ARTHROPLASTY-HIP-NOLBERTO;  Surgeon: Nasir Danielle MD;  Location: [facility] OR;  Service: Orthopedics;  Laterality: Right;  Depuy Bipolar  LAteral positioner with positioners    HYSTERECTOMY      TONSILLECTOMY        No current facility-administered medications on file prior to encounter.      Current Outpatient Medications on File Prior to Encounter   Medication Sig Dispense Refill    amitriptyline (ELAVIL) 10 MG tablet Take 1 tablet (10 mg total) by mouth 2 (two) times daily. 180 tablet 3    calcium carb/vit D3/minerals (CALCIUM CARBONATE-VIT D3-MIN) 600 mg (1,500 mg)-400 unit Chew Take 1 tablet by mouth once daily. 30 each 3    cyanocobalamin (VITAMIN B-12) 250 MCG tablet Take 250 mcg by mouth once daily.      ergocalciferol (VITAMIN D2) 50,000 unit Cap Take 50,000 Units by mouth every 7 days.      gabapentin (NEURONTIN) 300 MG capsule Take 1 capsule (300 mg total) by mouth every evening. 30 capsule 11    guaifenesin-codeine 100-10 mg/5 ml (TUSSI-ORGANIDIN NR)  mg/5 mL syrup Take 10 mLs by mouth every 4 (four) hours as needed for Cough. 240 mL 0    HYDROcodone-acetaminophen (NORCO)  mg per tablet Take 1 tablet by mouth every 6 (six) hours as needed for Pain. 8 tablet 0    verapamil (CALAN-SR) 240 MG CR tablet Take 1 tablet (240 mg total) by mouth once daily. 90 tablet 3    vitamin A 8000 UNIT capsule Take 8,000 Units by mouth once daily.        Allergies: Celebrex [celecoxib]; Cymbalta [duloxetine]; Iodine and iodide containing products; Lovastatin; Pneumococcal 23-gregorio ps vaccine; Sulpho-lac [sulfur]; Vioxx  [rofecoxib]; and Savella [milnacipran]    History reviewed. No pertinent family history.  Social History     Tobacco Use    Smoking status: Never Smoker    Smokeless tobacco: Never Used   Substance Use Topics    Alcohol use: No    Drug use: No     Review of Systems     Unable to obtain due to aphasia  Objective:     Vitals:    Temp: 97.6 °F (36.4 °C)  Pulse: (!) 111  Rhythm: atrial rhythm  BP: (!) 144/72  MAP (mmHg): 103  Resp: (!) 29  SpO2: 100 %  O2 Device (Oxygen Therapy): nasal cannula w/ humidification    Temp  Min: 97.5 °F (36.4 °C)  Max: 98.6 °F (37 °C)  Pulse  Min: 99  Max: 125  BP  Min: 102/71  Max: 153/85  MAP (mmHg)  Min: 82  Max: 109  Resp  Min: 20  Max: 34  SpO2  Min: 98 %  Max: 100 %  Oxygen Concentration (%)  Min: 32  Max: 32    11/28 0701 - 11/29 0700  In: 158.6 [I.V.:158.6]  Out: 650 [Urine:650]   Unmeasured Output  Stool Occurrence: 0       Physical Exam    GA: Alert, comfortable, no acute distress.   HEENT: No scleral icterus or JVD.   Pulmonary: Clear to auscultation A/L. No wheezing, crackles, or rhonchi.  Cardiac: RRR S1 & S2 w/o rubs/murmurs/gallops.   Abdominal: Bowel sounds present x 4. No appreciable hepatosplenomegaly.  Skin: No jaundice, rashes, or visible lesions.  Neuro:  --GCS: E4 V1 M5  --Mental Status:  AA, not following commands, aphasic, moves left spontaneously, pain on right lower, and no movement on the RUE.  --CN II-XII grossly intact.   --Pupils 2mm, PERRL.   --Corneal reflex, gag, cough intact.  --LUE strength: 4/5  --LLE strength: 5/5  --RUE strength: 0/5  --RLE strength: 3/5      Today I personally reviewed pertinent medications, lines/drains/airways, imaging, cardiology results, laboratory results, microbiology results, notably:

## 2018-11-29 NOTE — PROGRESS NOTES
[facility]  Neurocritical Care  Progress Note    Admit Date: 11/27/2018  Service Date: 11/29/2018  Length of Stay: 2    Subjective:     Chief Complaint: Embolic stroke involving left carotid artery    History of Present Illness: Ms. 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 The patient initially presented to [facility] with severe right sided weakness and aphasia with a NIH stroke scale of 22. Tpa end time was 1642.     Hospital Course: 11/27: Admit to [facility], TPA, Thrombectomy  11/28: Cards consult for trops>50, Mri stable, possible LHC, Heparin drip .3-.5,   11/29: Awaiting cards decision to do LHC, CTH in am, CXR    Past Medical History:   Diagnosis Date    Atrial fibrillation with RVR 11/28/2018    Depression     Hypertension     Neuropathy      Past Surgical History:   Procedure Laterality Date    APPENDECTOMY      ARTHROPLASTY-HIP-NOLBERTO Right 11/10/2018    Performed by Nasir Danielle MD at [facility] OR    GALLBLADDER SURGERY      HEMIARTHROPLASTY OF HIP Right 11/10/2018    Procedure: ARTHROPLASTY-HIP-NOLBERTO;  Surgeon: Nasir Danielle MD;  Location: [facility] OR;  Service: Orthopedics;  Laterality: Right;  Depuy Bipolar  LAteral positioner with positioners    HYSTERECTOMY      TONSILLECTOMY        No current facility-administered medications on file prior to encounter.      Current Outpatient Medications on File Prior to Encounter   Medication Sig Dispense Refill    amitriptyline (ELAVIL) 10 MG tablet Take 1 tablet (10 mg total) by mouth 2 (two) times daily. 180 tablet 3    calcium carb/vit D3/minerals (CALCIUM CARBONATE-VIT D3-MIN) 600 mg (1,500 mg)-400 unit Chew Take 1 tablet by mouth once daily. 30 each 3    cyanocobalamin (VITAMIN B-12) 250 MCG tablet Take 250 mcg by mouth once daily.      ergocalciferol (VITAMIN D2) 50,000 unit Cap Take  50,000 Units by mouth every 7 days.      gabapentin (NEURONTIN) 300 MG capsule Take 1 capsule (300 mg total) by mouth every evening. 30 capsule 11    guaifenesin-codeine 100-10 mg/5 ml (TUSSI-ORGANIDIN NR)  mg/5 mL syrup Take 10 mLs by mouth every 4 (four) hours as needed for Cough. 240 mL 0    HYDROcodone-acetaminophen (NORCO)  mg per tablet Take 1 tablet by mouth every 6 (six) hours as needed for Pain. 8 tablet 0    verapamil (CALAN-SR) 240 MG CR tablet Take 1 tablet (240 mg total) by mouth once daily. 90 tablet 3    vitamin A 8000 UNIT capsule Take 8,000 Units by mouth once daily.        Allergies: Celebrex [celecoxib]; Cymbalta [duloxetine]; Iodine and iodide containing products; Lovastatin; Pneumococcal 23-gregorio ps vaccine; Sulpho-lac [sulfur]; Vioxx [rofecoxib]; and Savella [milnacipran]    History reviewed. No pertinent family history.  Social History     Tobacco Use    Smoking status: Never Smoker    Smokeless tobacco: Never Used   Substance Use Topics    Alcohol use: No    Drug use: No     Review of Systems     Unable to obtain due to aphasia  Objective:     Vitals:    Temp: 97.6 °F (36.4 °C)  Pulse: (!) 111  Rhythm: atrial rhythm  BP: (!) 144/72  MAP (mmHg): 103  Resp: (!) 29  SpO2: 100 %  O2 Device (Oxygen Therapy): nasal cannula w/ humidification    Temp  Min: 97.5 °F (36.4 °C)  Max: 98.6 °F (37 °C)  Pulse  Min: 99  Max: 125  BP  Min: 102/71  Max: 153/85  MAP (mmHg)  Min: 82  Max: 109  Resp  Min: 20  Max: 34  SpO2  Min: 98 %  Max: 100 %  Oxygen Concentration (%)  Min: 32  Max: 32    11/28 0701 - 11/29 0700  In: 158.6 [I.V.:158.6]  Out: 650 [Urine:650]   Unmeasured Output  Stool Occurrence: 0       Physical Exam    GA: Alert, comfortable, no acute distress.   HEENT: No scleral icterus or JVD.   Pulmonary: Clear to auscultation A/L. No wheezing, crackles, or rhonchi.  Cardiac: RRR S1 & S2 w/o rubs/murmurs/gallops.   Abdominal: Bowel sounds present x 4. No appreciable  hepatosplenomegaly.  Skin: No jaundice, rashes, or visible lesions.  Neuro:  --GCS: E4 V1 M5  --Mental Status:  AA, not following commands, aphasic, moves left spontaneously, pain on right lower, and no movement on the RUE.  --CN II-XII grossly intact.   --Pupils 2mm, PERRL.   --Corneal reflex, gag, cough intact.  --LUE strength: 4/5  --LLE strength: 5/5  --RUE strength: 0/5  --RLE strength: 3/5      Today I personally reviewed pertinent medications, lines/drains/airways, imaging, cardiology results, laboratory results, microbiology results, notably:        Assessment/Plan:     Neuro   * Embolic stroke involving left carotid artery    -- Continue Neuro checks q 1hr  -- Vascular Neurology consulted  -- CTA multiphase pending  -- SBP goal <140  -- PT/OT/Speech  -- CXR  -- Electrolyte replace prn  -- Tici 2C reperfusion   -- 11/28 Mri stable  -- 11/29 F/u CTH in am  -- 11/29 F/u CxR     Cardiac/Vascular   Atrial fibrillation    -- Metoprolol continued  -- Rate controlled      NSTEMI (non-ST elevated myocardial infarction)    -- Card considering [facility]   -- Hep drip started per cards rec     Essential hypertension    -- Continue to monitor HR and BP   -- SBP goal < 160  -- Metoprolol                Activity Orders          Straight Cath starting at 11/29 1031        Full Code    Linus Carolina NP  Neurocritical Care  [facility]

## 2018-11-29 NOTE — PROGRESS NOTES
[facility]  Vascular Neurology  Comprehensive Stroke Center  Progress Note    Assessment/Plan:     * Embolic stroke involving left carotid artery    Apple Watson is a 89 y.o. female with PMHx of HTN and new onset a fib who presented to OSH with L MCA syndrome. She was treated with tPA and transferred to [facility]. CTA with L ICA occlusion, patient taken to IR for possible intervention.     Recommendations:  -Rate control afib with metoprolol  -Continue heparin gtt  -Atrov 40 mg dialy  -SBP goal <180  -Aggressive PT/OT/SLP  -MRI review okay for AC        Antithrombotics for secondary stroke prevention:  Heparin gtt    Statins for secondary stroke prevention and hyperlipidemia, if present:   Statins: Atorvastatin- 40 mg daily    Aggressive risk factor modification: HTN, A-Fib     Rehab efforts: Occupational Therapy, PT/OT/SLP to evaluate and treat, PM&R consult     Diagnostics ordered/pending: None    VTE prophylaxis: None: Reason for No Pharmacological VTE Prophylaxis: Mechanical prophylaxis: Place SCDs    BP parameters: Infarct: Post tPA, SBP <180         Atrial fibrillation with RVR    First noted on EKG post hip replacement surgery, 11/11/18, No anticoagulation was initiated at that time.   A fib noted on monitor on arrival in ED, EKG obtained confirmed afib RVR  Will need to discuss secondary stroke prevention with patient and family    Atrial Fibrillation Assessment ([id number]):       Condition Points    C   Congestive heart failure (or Left ventricular systolic dysfunction) 0    H Hypertension: blood pressure consistently above 140/90 mmHg (or treated hypertension on medication) 1    A2  Age ?75 years 2    D  Diabetes Mellitus 0    S2  Prior Stroke or TIA or Thromboembolism  2    V  Vascular disease (e.g. peripheral artery disease, myocardial infarction, aortic plaque) ?    A  Age 65-74 years 0    Sc  Sex category (i.e. female sex) 1                                                                                   [id number] Score: 6  [id number] Score Stroke Risk %   6 9.8                                                                                  Annual Stroke Risk:  9.8%    Score Risk Anticoagulation Therapy Considerations   2 or greater High Oral anticoagulant Oral anticoagulant, using either a new oral anticoagulant drug   (apixaban, rivaroxaban or dabigatran) or well controlled Warfarin at INR 2.0-3.0             Recommendation:     -[id number] score of 6= 9.% risk.    -If transition to warfarin, would likely need heparin bridge   -Would begin patient on AC prior to discharge, if no other contraindication.    -Rate control with Metoprolol tartrate while inpatient and transition to 24h metoprolol succinate prior to d/c.    -Cardiology following              Received intravenous tissue plasminogen activator (tPA) in emergency department    Admitting to neuro ICU for post tPA monitoring     Atrial fibrillation    Stroke risk factor  First noted on EKG post hip replacement surgery, 11/11/18  No anticoagulation initiated  Rate treated with metoprolol  Patient returned to normal sinus rhythm spontaneously  A fib noted on monitor on arrival in ED, EKG obtained to confirm  Will need to discuss secondary stroke prevention with patient and family      NSTEMI (non-ST elevated myocardial infarction)    Trop >50  NSTEMI  -Cardiology assisting      Closed fracture of neck of right femur    Recent surgery on 11/10/18     Essential hypertension    Stroke risk factor  SBP <180 post tPA  Management per neuro ICU  BP currently at goal          No notes on file    STROKE DOCUMENTATION   Acute Stroke Times   Last Known Normal Date: 11/27/18  Last Known Normal Time: 1300  Symptom Onset Date: 11/27/18  Symptom Onset Time: 1330  Stroke Team Called Date: 11/27/18  Stroke Team Called Time: 1649  Stroke Team Arrival Date: 11/27/18  Stroke Team Arrival Time: 1650  CT Interpretation Time: 1718  Decision to Treat Time  for Alteplase: (administed at OSH via telemedicine)  Decision to Treat Time for IR: 1732    NIH Scale:  1a. Level Of Consciousness: 0-->Alert: keenly responsive  1b. LOC Questions: 0-->Answers both questions correctly  1c. LOC Commands: 0-->Performs both tasks correctly  2. Best Gaze: 0-->Normal  3. Visual: 0-->No visual loss  4. Facial Palsy: 2-->Partial paralysis (total or near-total paralysis of lower face)  5a. Motor Arm, Left: 0-->No drift: limb holds 90 (or 45) degrees for full 10 secs  5b. Motor Arm, Right: 1-->Drift: limb holds 90 (or 45) degrees, but drifts down before full 10 secs: does not hit bed or other support  6a. Motor Leg, Left: 2-->Some effort against gravity: leg falls to bed by 5 secs, but has some effort against gravity  6b. Motor Leg, Right: 3-->No effort against gravity: leg falls to bed immediately  7. Limb Ataxia: 0-->Absent  8. Sensory: 0-->Normal: no sensory loss  9. Best Language: 3-->Mute, global aphasia: no usable speech or auditory comprehension  10. Dysarthria: 2-->Severe dysarthria: patients speech is so slurred as to be unintelligible in the absence of or out of proportion to any dysphasia, or is mute/anarthric  11. Extinction and Inattention (formerly Neglect): 1-->Visual, tactile, auditory, spatial, or personal inattention or extinction to bilateral simultaneous stimulation in one of the sensory modalities  Total (NIH Stroke Scale): 14       Modified [place] Score: 3  Barton Coma Scale:12   ABCD2 Score:    [id number] Score:   HAS -BLED Score:   ICH Score:   Hunt & Tapia Classification:      Hemorrhagic change of an Ischemic Stroke: Does this patient have an ischemic stroke with hemorrhagic changes? No     Neurologic Chief Complaint: L ICA terminus occlusion  RSW, RFD, L-gaze, Mute    Subjective:     Interval History: Patient is seen for follow-up neurological assessment and treatment recommendations: Continue heparin gtt and rate control with metoprolol. PT/OT/SLP    HPI, Past  Medical, Family, and Social History remains the same as documented in the initial encounter.     Review of Systems   Constitutional: Negative for chills and fever.   Respiratory: Negative for cough.    Cardiovascular: Negative for chest pain.   Gastrointestinal: Negative for nausea and vomiting.   Neurological: Positive for facial asymmetry, speech difficulty and weakness.     Scheduled Meds:   atorvastatin  40 mg Oral Daily    metoprolol tartrate  25 mg Per NG tube TID    senna-docusate 8.6-50 mg  2 tablet Per NG tube BID    sodium chloride 0.9%  3 mL Intravenous Q8H     Continuous Infusions:   heparin (porcine) in D5W 14 Units/kg/hr (11/29/18 0900)    niCARdipine Stopped (11/28/18 1037)     PRN Meds:acetaminophen, calcium gluconate IVPB, calcium gluconate IVPB, calcium gluconate IVPB, magnesium sulfate IVPB, magnesium sulfate IVPB, ondansetron, sodium chloride 0.9%    Objective:     Vital Signs (Most Recent):  Temp: 97.5 °F (36.4 °C) (11/29/18 0701)  Pulse: (!) 120 (11/29/18 0802)  Resp: (!) 27 (11/29/18 0802)  BP: 119/83 (11/29/18 0802)  SpO2: 100 % (11/29/18 0802)  BP Location: Right arm    Vital Signs Range (Last 24H):  Temp:  [97.5 °F (36.4 °C)-99.1 °F (37.3 °C)]   Pulse:  []   Resp:  [19-30]   BP: (102-152)/(67-89)   SpO2:  [98 %-100 %]   BP Location: Right arm    Physical Exam   Constitutional: She appears well-developed and well-nourished. No distress.   HENT:   Head: Normocephalic and atraumatic.   Eyes: Pupils are equal, round, and reactive to light.   Cardiovascular: Normal rate.   Irregularly irregular  Tachycardic   Pulmonary/Chest: Effort normal. No stridor. No respiratory distress. She has no wheezes.   Abdominal: She exhibits no mass. There is no guarding.   Neurological: She is alert.   Skin: Skin is warm and dry. Capillary refill takes less than 2 seconds.   Nursing note and vitals reviewed.      Neurological Exam:     LOC: alert  Attention Span: Good   Language: mute   Articulation:  mute  Orientation: Not oriented to time  Visual Fields: left gaze  EOM (CN III, IV, VI): Full/intact  Pupils (CN II, III): PERRL  Facial Sensation (CN V): Normal  Facial Movement (CN VII): Symmetric facial expression    Motor:   Arm left  Normal 4/5  Leg left  Normal  2/5  Arm right  Normal  3/5  Leg right Normal 1/5  Sensation: unable  Tone: Normal tone throughout  Reflexes +2 throughout               Laboratory:    Recent Results (from the past 24 hour(s))   Transthoracic echo (TTE) complete (Cupid Only)    Collection Time: 11/28/18 10:41 AM   Result Value Ref Range    Ascending aorta 2.94 cm    STJ 2.78 cm    AV mean gradient 7.81 mmHg    Ao peak benjamin 1.80 m/s    Ao VTI 25.29 cm    IVS 0.74 0.6 - 1.1 cm    LA size 3.73 cm    Left Atrium Major Axis 6.41 cm    Left Atrium Minor Axis 5.93 cm    LVIDD 3.80 3.5 - 6.0 cm    LVIDS 2.70 (A) 2.1 - 4.0 cm    LVOT diameter 2.03 cm    LVOT peak VTI 10.50 cm    PW 0.69 0.6 - 1.1 cm    MV Peak E Benjamin 1.28 m/s    RA Major Axis 6.00 cm    RA Width 3.95 cm    RVDD 3.19 cm    Sinus 3.37 cm    TAPSE 0.84 cm    TR Max Benjamin 3.27 m/s    TDI LATERAL 1.38     LA WIDTH 5.09 cm    LV Diastolic Volume 96.35 mL    LV Systolic Volume 77.49 mL    LV LATERAL E/E' RATIO 0.93     FS 29 %    LA volume 99.42 cm3    LV mass 73.97 g    Left Ventricle Relative Wall Thickness 0.36 cm    AV valve area 1.34 cm2    AV index (prosthetic) 0.42     LVOT area 3.23 cm2    LVOT stroke volume 33.97 cm3    AV peak gradient 12.96 mmHg    Triscuspid Valve Regurgitation Peak Gradient 42.77 mmHg    BSA 1.63 m2    LV Systolic Volume Index 47.5 mL/m2    LV Diastolic Volume Index 59.11 mL/m2    LA Volume Index 61.0 mL/m2    LV Mass Index 45.4 g/m2    Right Atrial Pressure (from IVC) 3 mmHg    TV rest pulmonary artery pressure 45.77 mmHg   ISTAT PROCEDURE    Collection Time: 11/28/18 11:17 AM   Result Value Ref Range    POC PH 7.477 (H) 7.35 - 7.45    POC PCO2 36.2 35 - 45 mmHg    POC PO2 86 80 - 100 mmHg    POC HCO3 26.8  24 - 28 mmol/L    POC BE 3 -2 to 2 mmol/L    POC SATURATED O2 97 95 - 100 %    POC TCO2 28 (H) 23 - 27 mmol/L    Sample ARTERIAL     Site RR     Allens Test Pass     DelSys Nasal Can     Mode SPONT     Flow 3     FiO2 32     Sp02 100    CK-MB    Collection Time: 11/28/18 11:25 AM   Result Value Ref Range    CPK 1353 (H) 20 - 180 U/L    CPK .7 (H) 0.1 - 6.5 ng/mL    MB% 13.8 (H) 0.0 - 5.0 %   Troponin I    Collection Time: 11/28/18 11:25 AM   Result Value Ref Range    Troponin I >50.000 (H) 0.000 - 0.026 ng/mL   POCT glucose    Collection Time: 11/28/18  1:53 PM   Result Value Ref Range    POCT Glucose 129 (H) 70 - 110 mg/dL   POCT glucose    Collection Time: 11/28/18  5:34 PM   Result Value Ref Range    POCT Glucose 102 70 - 110 mg/dL   CK-MB    Collection Time: 11/28/18  5:36 PM   Result Value Ref Range     (H) 20 - 180 U/L    CPK MB 98.7 (H) 0.1 - 6.5 ng/mL    MB% 10.3 (H) 0.0 - 5.0 %   Troponin I    Collection Time: 11/28/18  5:36 PM   Result Value Ref Range    Troponin I >50.000 (H) 0.000 - 0.026 ng/mL   APTT    Collection Time: 11/28/18  7:03 PM   Result Value Ref Range    aPTT 24.7 21.0 - 32.0 sec   Protime-INR    Collection Time: 11/28/18  7:03 PM   Result Value Ref Range    Prothrombin Time 11.0 9.0 - 12.5 sec    INR 1.1 0.8 - 1.2   CBC auto differential    Collection Time: 11/28/18  7:03 PM   Result Value Ref Range    WBC 13.34 (H) 3.90 - 12.70 K/uL    RBC 3.43 (L) 4.00 - 5.40 M/uL    Hemoglobin 9.8 (L) 12.0 - 16.0 g/dL    Hematocrit 31.8 (L) 37.0 - 48.5 %    MCV 93 82 - 98 fL    MCH 28.6 27.0 - 31.0 pg    MCHC 30.8 (L) 32.0 - 36.0 g/dL    RDW 13.3 11.5 - 14.5 %    Platelets 470 (H) 150 - 350 K/uL    MPV 9.9 9.2 - 12.9 fL    Immature Granulocytes 0.4 0.0 - 0.5 %    Gran # (ANC) 10.7 (H) 1.8 - 7.7 K/uL    Immature Grans (Abs) 0.06 (H) 0.00 - 0.04 K/uL    Lymph # 1.4 1.0 - 4.8 K/uL    Mono # 1.1 (H) 0.3 - 1.0 K/uL    Eos # 0.0 0.0 - 0.5 K/uL    Baso # 0.06 0.00 - 0.20 K/uL    nRBC 0 0 /100 WBC     Gran% 80.4 (H) 38.0 - 73.0 %    Lymph% 10.6 (L) 18.0 - 48.0 %    Mono% 8.1 4.0 - 15.0 %    Eosinophil% 0.1 0.0 - 8.0 %    Basophil% 0.4 0.0 - 1.9 %    Differential Method Automated    POCT glucose    Collection Time: 11/28/18 11:12 PM   Result Value Ref Range    POCT Glucose 112 (H) 70 - 110 mg/dL   Comprehensive metabolic panel    Collection Time: 11/29/18  1:10 AM   Result Value Ref Range    Sodium 141 136 - 145 mmol/L    Potassium 4.2 3.5 - 5.1 mmol/L    Chloride 107 95 - 110 mmol/L    CO2 26 23 - 29 mmol/L    Glucose 115 (H) 70 - 110 mg/dL    BUN, Bld 27 (H) 8 - 23 mg/dL    Creatinine 0.7 0.5 - 1.4 mg/dL    Calcium 8.4 (L) 8.7 - 10.5 mg/dL    Total Protein 5.7 (L) 6.0 - 8.4 g/dL    Albumin 2.7 (L) 3.5 - 5.2 g/dL    Total Bilirubin 0.5 0.1 - 1.0 mg/dL    Alkaline Phosphatase 152 (H) 55 - 135 U/L     (H) 10 - 40 U/L    ALT 34 10 - 44 U/L    Anion Gap 8 8 - 16 mmol/L    eGFR if African American >60.0 >60 mL/min/1.73 m^2    eGFR if non African American >60.0 >60 mL/min/1.73 m^2   CBC auto differential    Collection Time: 11/29/18  1:10 AM   Result Value Ref Range    WBC 14.21 (H) 3.90 - 12.70 K/uL    RBC 3.37 (L) 4.00 - 5.40 M/uL    Hemoglobin 9.9 (L) 12.0 - 16.0 g/dL    Hematocrit 32.0 (L) 37.0 - 48.5 %    MCV 95 82 - 98 fL    MCH 29.4 27.0 - 31.0 pg    MCHC 30.9 (L) 32.0 - 36.0 g/dL    RDW 13.2 11.5 - 14.5 %    Platelets 432 (H) 150 - 350 K/uL    MPV 9.4 9.2 - 12.9 fL    Immature Granulocytes 0.4 0.0 - 0.5 %    Gran # (ANC) 11.6 (H) 1.8 - 7.7 K/uL    Immature Grans (Abs) 0.06 (H) 0.00 - 0.04 K/uL    Lymph # 1.3 1.0 - 4.8 K/uL    Mono # 1.2 (H) 0.3 - 1.0 K/uL    Eos # 0.0 0.0 - 0.5 K/uL    Baso # 0.07 0.00 - 0.20 K/uL    nRBC 0 0 /100 WBC    Gran% 81.9 (H) 38.0 - 73.0 %    Lymph% 9.0 (L) 18.0 - 48.0 %    Mono% 8.1 4.0 - 15.0 %    Eosinophil% 0.1 0.0 - 8.0 %    Basophil% 0.5 0.0 - 1.9 %    Differential Method Automated    Magnesium    Collection Time: 11/29/18  1:10 AM   Result Value Ref Range    Magnesium  2.7 (H) 1.6 - 2.6 mg/dL   Phosphorus    Collection Time: 11/29/18  1:10 AM   Result Value Ref Range    Phosphorus 3.7 2.7 - 4.5 mg/dL   Protime-INR    Collection Time: 11/29/18  1:10 AM   Result Value Ref Range    Prothrombin Time 11.2 9.0 - 12.5 sec    INR 1.1 0.8 - 1.2   APTT    Collection Time: 11/29/18  1:10 AM   Result Value Ref Range    aPTT 43.0 (H) 21.0 - 32.0 sec   CBC auto differential    Collection Time: 11/29/18  1:10 AM   Result Value Ref Range    WBC 14.21 (H) 3.90 - 12.70 K/uL    RBC 3.37 (L) 4.00 - 5.40 M/uL    Hemoglobin 9.9 (L) 12.0 - 16.0 g/dL    Hematocrit 32.0 (L) 37.0 - 48.5 %    MCV 95 82 - 98 fL    MCH 29.4 27.0 - 31.0 pg    MCHC 30.9 (L) 32.0 - 36.0 g/dL    RDW 13.2 11.5 - 14.5 %    Platelets 432 (H) 150 - 350 K/uL    MPV 9.4 9.2 - 12.9 fL    Immature Granulocytes 0.4 0.0 - 0.5 %    Gran # (ANC) 11.6 (H) 1.8 - 7.7 K/uL    Immature Grans (Abs) 0.06 (H) 0.00 - 0.04 K/uL    Lymph # 1.3 1.0 - 4.8 K/uL    Mono # 1.2 (H) 0.3 - 1.0 K/uL    Eos # 0.0 0.0 - 0.5 K/uL    Baso # 0.07 0.00 - 0.20 K/uL    nRBC 0 0 /100 WBC    Gran% 81.9 (H) 38.0 - 73.0 %    Lymph% 9.0 (L) 18.0 - 48.0 %    Mono% 8.1 4.0 - 15.0 %    Eosinophil% 0.1 0.0 - 8.0 %    Basophil% 0.5 0.0 - 1.9 %    Differential Method Automated    POCT glucose    Collection Time: 11/29/18  4:58 AM   Result Value Ref Range    POCT Glucose 110 70 - 110 mg/dL         Diagnostic Results       MRI brain 11/28/18:  -Moderate size regions of signal abnormality left MCA territory compatible with recent infarction.   -Please note small component of hemorrhage within the left mesial temporal component of infarction.  -In addition there is superimposed small region of recent infarction in the right inferior frontal lobe.  -Generalized cerebral volume loss with scattered T2 FLAIR signal hyperintensity supratentorial white matter and lilly concerning for underlying chronic ischemic change.  -No evidence for hydrocephalus with left middle cranial fossa probable  rack node cyst        Brain imaging:  CT Head. Date: 11/27/18  No CT evidence of acute intracranial abnormality.    Vessel Imaging:  CTA Multiphase. Date: 11/27/18  Partial opacification of the left cervical ICA, without meaningful opacification of the ICA distally or intracranially, findings suggests thrombus, either within the vessel itself, or the carotid terminus.  There is some flow within the distal M2 and M3 branches on the left, with improved flow on phase 2 and face 3 however no meaningful flow is identified within the M1 segment on the left.  These findings likely correlate with vague hypoattenuation seen within the region of the left basal ganglia and partially left caudate/subinsular region.    No additional regions of high-grade stenosis or occlusion, please see above for full details.       Cardiac Evaluation:   TTE 11/28/18:  · Severe left atrial enlargement.  · Mildly decreased left ventricular systolic function. The estimated ejection fraction is 45%  · Septal wall has abnormal motion.  · Left ventricular diastolic dysfunction.  · Moderate-to-severe mitral regurgitation.  · Moderate to severe tricuspid regurgitation.  · The estimated PA systolic pressure is 45.77 mm Hg  · Normal central venous pressure (3 mm Hg).        Hansel Donaldson MD  Comprehensive Stroke Center  Department of Vascular Neurology   [facility]

## 2018-11-29 NOTE — PLAN OF CARE
Problem: Patient Care Overview  Goal: Plan of Care Review  Outcome: Ongoing (interventions implemented as appropriate)  POC reviewed with Apple and son at 0500. Pt is aphasic/nonverbal and cannot verbalized understanding; son verbalized understanding. Questions and concerns addressed. No acute events overnight. Pt progressing toward goals. Heparin drip doing at 14 units/hr. Inconsistently follow commands and moves all extremities. Will continue to monitor. See flowsheets for full assessment and VS info

## 2018-11-29 NOTE — PLAN OF CARE
Problem: Patient Care Overview  Goal: Plan of Care Review  Outcome: Ongoing (interventions implemented as appropriate)  Nutrition assessment completed. Please see RD note for details.    Recommendation/Intervention:   1. Recommend continuing Isosource 1.5 @ goal rate 40mL/hr.   - Provides 1440kcals, 65g protein and 733mL free water.   - Hold for residuals >500mL.     2. If able to advance diet, recommend Regular with texture per SLP recommendations.     RD to monitor.    Goals: Pt to tolerate >85% EEN and EPN  Nutrition Goal Status: new  Communication of RD Recs: discussed on rounds

## 2018-11-29 NOTE — PROGRESS NOTES
Pt in VTach. AMALIA Lopez notified. Pt heparin started and stopped per order. AMALIA Lopez notified. Nurse awaiting intervention.

## 2018-11-29 NOTE — CONSULTS
"  [facility]  Adult Nutrition  Consult Note    SUMMARY     Recommendations    Recommendation/Intervention:   1. Recommend continuing Isosource 1.5 @ goal rate 40mL/hr.   - Provides 1440kcals, 65g protein and 733mL free water.   - Hold for residuals >500mL.     2. If able to advance diet, recommend Regular with texture per SLP recommendations.     RD to monitor.    Goals: Pt to tolerate >85% EEN and EPN  Nutrition Goal Status: new  Communication of RD Recs: discussed on rounds    Reason for Assessment    Reason for Assessment: consult  Diagnosis: stroke/CVA  Relevant Medical History: HTN, a fib, neuropathy  Interdisciplinary Rounds: attended  General Information Comments: Pt remains NPO per SLP recommendations. NG tube in place. Dtgr at bedside reports adequate po intake (pt cooks at home consistently), no weight loss. NFPE completed - pt with muscle loss in clavicle and temple region likely age related however no physical signs of malnutrition.  Nutrition Discharge Planning: unable to determine at this time    Nutrition Risk Screen    Nutrition Risk Screen: no indicators present    Nutrition/Diet History    Do you have any cultural, spiritual, Christianity conflicts, given your current situation?: none stated  Factors Affecting Nutritional Intake: NPO    Anthropometrics    Temp: 98.2 °F (36.8 °C)  Height Method: Stated  Height: 4' 10.66" (149 cm)  Height (inches): 58.66 in  Weight Method: Bed Scale  Weight: 63.5 kg (139 lb 15.9 oz)  Weight (lb): 139.99 lb  Ideal Body Weight (IBW), Female: 93.3 lb  % Ideal Body Weight, Female (lb): 150.04 lb  BMI (Calculated): 28.7  BMI Grade: 25 - 29.9 - overweight       Lab/Procedures/Meds    Pertinent Labs Reviewed: reviewed  Pertinent Medications Reviewed: reviewed  Pertinent Medications Comments: heparin    Physical Findings/Assessment    Overall Physical Appearance: nourished, advanced age, on oxygen therapy  Tubes: nasogastric tube  Skin: " intact    Estimated/Assessed Needs    Weight Used For Calorie Calculations: 63.5 kg (139 lb 15.9 oz)  Energy Calorie Requirements (kcal): 1200  Energy Need Method: [place]-St Jeor(PAL 1.25)  Protein Requirements: 64-76g(1.0-1.2g/kg)  Weight Used For Protein Calculations: 63.5 kg (139 lb 15.9 oz)  Fluid Requirements (mL): 1mL/kcal or per MD     RDA Method (mL): 1200         Nutrition Prescription Ordered    Current Diet Order: NPO  Nutrition Order Comments: TF to be started today  Current Nutrition Support Formula Ordered: Isosource 1.5  Current Nutrition Support Rate Ordered: 40 (ml)  Current Nutrition Support Frequency Ordered: mL/hr    Evaluation of Received Nutrient/Fluid Intake    Enteral Calories (kcal): 1440  Enteral Protein (gm): 65  Enteral (Free Water) Fluid (mL): 733  % Kcal Needs: 120  % Protein Needs: 97  I/O: -I/O, low UOP, no BM since admission  Energy Calories Required: exceed needs  Protein Required: meeting needs  Fluid Required: other (see comments)(per MD)  % Intake of Estimated Energy Needs: 75 - 100 %  % Meal Intake: NPO    Nutrition Risk    Level of Risk/Frequency of Follow-up: (f/u 2x/week)     Assessment and Plan    Nutrition Problem  Inadequate oral intake    Related to (etiology):   NPO    Signs and Symptoms (as evidenced by):   Pt requiring alternative means of nutrition to meet >85% EEN and EPN.     Interventions/Recommendations (treatment strategy):  Continue enteral nutrition, advance to general healthful diet pending medical course    Nutrition Diagnosis Status:   New           Monitor and Evaluation    Food and Nutrient Intake: energy intake, food and beverage intake, enteral nutrition intake  Food and Nutrient Adminstration: diet order, enteral and parenteral nutrition administration  Anthropometric Measurements: weight, weight change, body mass index  Biochemical Data, Medical Tests and Procedures: electrolyte and renal panel, gastrointestinal profile, glucose/endocrine profile,  inflammatory profile, lipid profile  Nutrition-Focused Physical Findings: overall appearance     Nutrition Follow-Up    RD Follow-up?: Yes

## 2018-11-29 NOTE — ASSESSMENT & PLAN NOTE
Apple Watson is a 89 y.o. female with PMHx of HTN and new onset a fib who presented to OSH with L MCA syndrome. She was treated with tPA and transferred to [facility]. CTA with L ICA occlusion, patient taken to IR for possible intervention.     Recommendations:  -Rate control afib with metoprolol  -Continue heparin gtt  -Atrov 40 mg dialy  -SBP goal <180  -Aggressive PT/OT/SLP  -MRI review okay for AC        Antithrombotics for secondary stroke prevention:  Heparin gtt    Statins for secondary stroke prevention and hyperlipidemia, if present:   Statins: Atorvastatin- 40 mg daily    Aggressive risk factor modification: HTN, A-Fib     Rehab efforts: Occupational Therapy, PT/OT/SLP to evaluate and treat, PM&R consult     Diagnostics ordered/pending: None    VTE prophylaxis: None: Reason for No Pharmacological VTE Prophylaxis: Mechanical prophylaxis: Place SCDs    BP parameters: Infarct: Post tPA, SBP <180

## 2018-11-29 NOTE — PLAN OF CARE
Problem: Occupational Therapy Goal  Goal: Occupational Therapy Goal  Goals to be met by: 12/7     Patient will increase functional independence with ADLs by performing:    UE Dressing with Maximum Assistance.  LE Dressing with Maximum Assistance.  Grooming while seated with Moderate Assistance.  Toileting from bedside commode with Maximum Assistance for hygiene and clothing management.   Sitting at edge of bed x15 minutes with Moderate Assistance.  Rolling to Bilateral with Moderate Assistance.   Supine to sit with Moderate Assistance.  Stand pivot transfers with Maximum Assistance.  Pt will follow 3/4 one step commands.  Pt will maintain eyes open 75% of session.     Outcome: Ongoing (interventions implemented as appropriate)  Goals remain appropriate

## 2018-11-29 NOTE — PLAN OF CARE
Problem: Physical Therapy Goal  Goal: Physical Therapy Goal  Outcome: Ongoing (interventions implemented as appropriate)  Initial eval completed.  Results, POC, and therapy recommendations discussed with patient and her dtr.   Complete evaluation documentation to follow.    D/c recommendations: JHON Chino, PT  11/29/2018  [phone] (pager)

## 2018-11-29 NOTE — SUBJECTIVE & OBJECTIVE
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 PT/OT/SLP    HPI, Past Medical, Family, and Social History remains the same as documented in the initial encounter.     Review of Systems   Constitutional: Negative for chills and fever.   Respiratory: Negative for cough.    Cardiovascular: Negative for chest pain.   Gastrointestinal: Negative for nausea and vomiting.   Neurological: Positive for facial asymmetry, speech difficulty and weakness.     Scheduled Meds:   atorvastatin  40 mg Oral Daily    metoprolol tartrate  25 mg Per NG tube TID    senna-docusate 8.6-50 mg  2 tablet Per NG tube BID    sodium chloride 0.9%  3 mL Intravenous Q8H     Continuous Infusions:   heparin (porcine) in D5W 14 Units/kg/hr (11/29/18 0900)    niCARdipine Stopped (11/28/18 1037)     PRN Meds:acetaminophen, calcium gluconate IVPB, calcium gluconate IVPB, calcium gluconate IVPB, magnesium sulfate IVPB, magnesium sulfate IVPB, ondansetron, sodium chloride 0.9%    Objective:     Vital Signs (Most Recent):  Temp: 97.5 °F (36.4 °C) (11/29/18 0701)  Pulse: (!) 120 (11/29/18 0802)  Resp: (!) 27 (11/29/18 0802)  BP: 119/83 (11/29/18 0802)  SpO2: 100 % (11/29/18 0802)  BP Location: Right arm    Vital Signs Range (Last 24H):  Temp:  [97.5 °F (36.4 °C)-99.1 °F (37.3 °C)]   Pulse:  []   Resp:  [19-30]   BP: (102-152)/(67-89)   SpO2:  [98 %-100 %]   BP Location: Right arm    Physical Exam   Constitutional: She appears well-developed and well-nourished. No distress.   HENT:   Head: Normocephalic and atraumatic.   Eyes: Pupils are equal, round, and reactive to light.   Cardiovascular: Normal rate.   Irregularly irregular  Tachycardic   Pulmonary/Chest: Effort normal. No stridor. No respiratory distress. She has no wheezes.   Abdominal:  She exhibits no mass. There is no guarding.   Neurological: She is alert.   Skin: Skin is warm and dry. Capillary refill takes less than 2 seconds.   Nursing note and vitals reviewed.      Neurological Exam:     LOC: alert  Attention Span: Good   Language: mute   Articulation: mute  Orientation: Not oriented to time  Visual Fields: left gaze  EOM (CN III, IV, VI): Full/intact  Pupils (CN II, III): PERRL  Facial Sensation (CN V): Normal  Facial Movement (CN VII): Symmetric facial expression    Motor:   Arm left  Normal 4/5  Leg left  Normal 2/5  Arm right  Normal 3/5  Leg right Normal 1/5  Sensation: unable  Tone: Normal tone throughout  Reflexes +2 throughout               Laboratory:    Recent Results (from the past 24 hour(s))   Transthoracic echo (TTE) complete (Cupid Only)    Collection Time: 11/28/18 10:41 AM   Result Value Ref Range    Ascending aorta 2.94 cm    STJ 2.78 cm    AV mean gradient 7.81 mmHg    Ao peak benjamin 1.80 m/s    Ao VTI 25.29 cm    IVS 0.74 0.6 - 1.1 cm    LA size 3.73 cm    Left Atrium Major Axis 6.41 cm    Left Atrium Minor Axis 5.93 cm    LVIDD 3.80 3.5 - 6.0 cm    LVIDS 2.70 (A) 2.1 - 4.0 cm    LVOT diameter 2.03 cm    LVOT peak VTI 10.50 cm    PW 0.69 0.6 - 1.1 cm    MV Peak E Benjamin 1.28 m/s    RA Major Axis 6.00 cm    RA Width 3.95 cm    RVDD 3.19 cm    Sinus 3.37 cm    TAPSE 0.84 cm    TR Max Benjamin 3.27 m/s    TDI LATERAL 1.38     LA WIDTH 5.09 cm    LV Diastolic Volume 96.35 mL    LV Systolic Volume 77.49 mL    LV LATERAL E/E' RATIO 0.93     FS 29 %    LA volume 99.42 cm3    LV mass 73.97 g    Left Ventricle Relative Wall Thickness 0.36 cm    AV valve area 1.34 cm2    AV index (prosthetic) 0.42     LVOT area 3.23 cm2    LVOT stroke volume 33.97 cm3    AV peak gradient 12.96 mmHg    Triscuspid Valve Regurgitation Peak Gradient 42.77 mmHg    BSA 1.63 m2    LV Systolic Volume Index 47.5 mL/m2    LV Diastolic Volume Index 59.11 mL/m2    LA Volume Index 61.0 mL/m2    LV Mass Index 45.4 g/m2     Right Atrial Pressure (from IVC) 3 mmHg    TV rest pulmonary artery pressure 45.77 mmHg   ISTAT PROCEDURE    Collection Time: 11/28/18 11:17 AM   Result Value Ref Range    POC PH 7.477 (H) 7.35 - 7.45    POC PCO2 36.2 35 - 45 mmHg    POC PO2 86 80 - 100 mmHg    POC HCO3 26.8 24 - 28 mmol/L    POC BE 3 -2 to 2 mmol/L    POC SATURATED O2 97 95 - 100 %    POC TCO2 28 (H) 23 - 27 mmol/L    Sample ARTERIAL     Site RR     Allens Test Pass     DelSys Nasal Can     Mode SPONT     Flow 3     FiO2 32     Sp02 100    CK-MB    Collection Time: 11/28/18 11:25 AM   Result Value Ref Range    CPK 1353 (H) 20 - 180 U/L    CPK .7 (H) 0.1 - 6.5 ng/mL    MB% 13.8 (H) 0.0 - 5.0 %   Troponin I    Collection Time: 11/28/18 11:25 AM   Result Value Ref Range    Troponin I >50.000 (H) 0.000 - 0.026 ng/mL   POCT glucose    Collection Time: 11/28/18  1:53 PM   Result Value Ref Range    POCT Glucose 129 (H) 70 - 110 mg/dL   POCT glucose    Collection Time: 11/28/18  5:34 PM   Result Value Ref Range    POCT Glucose 102 70 - 110 mg/dL   CK-MB    Collection Time: 11/28/18  5:36 PM   Result Value Ref Range     (H) 20 - 180 U/L    CPK MB 98.7 (H) 0.1 - 6.5 ng/mL    MB% 10.3 (H) 0.0 - 5.0 %   Troponin I    Collection Time: 11/28/18  5:36 PM   Result Value Ref Range    Troponin I >50.000 (H) 0.000 - 0.026 ng/mL   APTT    Collection Time: 11/28/18  7:03 PM   Result Value Ref Range    aPTT 24.7 21.0 - 32.0 sec   Protime-INR    Collection Time: 11/28/18  7:03 PM   Result Value Ref Range    Prothrombin Time 11.0 9.0 - 12.5 sec    INR 1.1 0.8 - 1.2   CBC auto differential    Collection Time: 11/28/18  7:03 PM   Result Value Ref Range    WBC 13.34 (H) 3.90 - 12.70 K/uL    RBC 3.43 (L) 4.00 - 5.40 M/uL    Hemoglobin 9.8 (L) 12.0 - 16.0 g/dL    Hematocrit 31.8 (L) 37.0 - 48.5 %    MCV 93 82 - 98 fL    MCH 28.6 27.0 - 31.0 pg    MCHC 30.8 (L) 32.0 - 36.0 g/dL    RDW 13.3 11.5 - 14.5 %    Platelets 470 (H) 150 - 350 K/uL    MPV 9.9 9.2 - 12.9 fL     Immature Granulocytes 0.4 0.0 - 0.5 %    Gran # (ANC) 10.7 (H) 1.8 - 7.7 K/uL    Immature Grans (Abs) 0.06 (H) 0.00 - 0.04 K/uL    Lymph # 1.4 1.0 - 4.8 K/uL    Mono # 1.1 (H) 0.3 - 1.0 K/uL    Eos # 0.0 0.0 - 0.5 K/uL    Baso # 0.06 0.00 - 0.20 K/uL    nRBC 0 0 /100 WBC    Gran% 80.4 (H) 38.0 - 73.0 %    Lymph% 10.6 (L) 18.0 - 48.0 %    Mono% 8.1 4.0 - 15.0 %    Eosinophil% 0.1 0.0 - 8.0 %    Basophil% 0.4 0.0 - 1.9 %    Differential Method Automated    POCT glucose    Collection Time: 11/28/18 11:12 PM   Result Value Ref Range    POCT Glucose 112 (H) 70 - 110 mg/dL   Comprehensive metabolic panel    Collection Time: 11/29/18  1:10 AM   Result Value Ref Range    Sodium 141 136 - 145 mmol/L    Potassium 4.2 3.5 - 5.1 mmol/L    Chloride 107 95 - 110 mmol/L    CO2 26 23 - 29 mmol/L    Glucose 115 (H) 70 - 110 mg/dL    BUN, Bld 27 (H) 8 - 23 mg/dL    Creatinine 0.7 0.5 - 1.4 mg/dL    Calcium 8.4 (L) 8.7 - 10.5 mg/dL    Total Protein 5.7 (L) 6.0 - 8.4 g/dL    Albumin 2.7 (L) 3.5 - 5.2 g/dL    Total Bilirubin 0.5 0.1 - 1.0 mg/dL    Alkaline Phosphatase 152 (H) 55 - 135 U/L     (H) 10 - 40 U/L    ALT 34 10 - 44 U/L    Anion Gap 8 8 - 16 mmol/L    eGFR if African American >60.0 >60 mL/min/1.73 m^2    eGFR if non African American >60.0 >60 mL/min/1.73 m^2   CBC auto differential    Collection Time: 11/29/18  1:10 AM   Result Value Ref Range    WBC 14.21 (H) 3.90 - 12.70 K/uL    RBC 3.37 (L) 4.00 - 5.40 M/uL    Hemoglobin 9.9 (L) 12.0 - 16.0 g/dL    Hematocrit 32.0 (L) 37.0 - 48.5 %    MCV 95 82 - 98 fL    MCH 29.4 27.0 - 31.0 pg    MCHC 30.9 (L) 32.0 - 36.0 g/dL    RDW 13.2 11.5 - 14.5 %    Platelets 432 (H) 150 - 350 K/uL    MPV 9.4 9.2 - 12.9 fL    Immature Granulocytes 0.4 0.0 - 0.5 %    Gran # (ANC) 11.6 (H) 1.8 - 7.7 K/uL    Immature Grans (Abs) 0.06 (H) 0.00 - 0.04 K/uL    Lymph # 1.3 1.0 - 4.8 K/uL    Mono # 1.2 (H) 0.3 - 1.0 K/uL    Eos # 0.0 0.0 - 0.5 K/uL    Baso # 0.07 0.00 - 0.20 K/uL    nRBC 0 0 /100  WBC    Gran% 81.9 (H) 38.0 - 73.0 %    Lymph% 9.0 (L) 18.0 - 48.0 %    Mono% 8.1 4.0 - 15.0 %    Eosinophil% 0.1 0.0 - 8.0 %    Basophil% 0.5 0.0 - 1.9 %    Differential Method Automated    Magnesium    Collection Time: 11/29/18  1:10 AM   Result Value Ref Range    Magnesium 2.7 (H) 1.6 - 2.6 mg/dL   Phosphorus    Collection Time: 11/29/18  1:10 AM   Result Value Ref Range    Phosphorus 3.7 2.7 - 4.5 mg/dL   Protime-INR    Collection Time: 11/29/18  1:10 AM   Result Value Ref Range    Prothrombin Time 11.2 9.0 - 12.5 sec    INR 1.1 0.8 - 1.2   APTT    Collection Time: 11/29/18  1:10 AM   Result Value Ref Range    aPTT 43.0 (H) 21.0 - 32.0 sec   CBC auto differential    Collection Time: 11/29/18  1:10 AM   Result Value Ref Range    WBC 14.21 (H) 3.90 - 12.70 K/uL    RBC 3.37 (L) 4.00 - 5.40 M/uL    Hemoglobin 9.9 (L) 12.0 - 16.0 g/dL    Hematocrit 32.0 (L) 37.0 - 48.5 %    MCV 95 82 - 98 fL    MCH 29.4 27.0 - 31.0 pg    MCHC 30.9 (L) 32.0 - 36.0 g/dL    RDW 13.2 11.5 - 14.5 %    Platelets 432 (H) 150 - 350 K/uL    MPV 9.4 9.2 - 12.9 fL    Immature Granulocytes 0.4 0.0 - 0.5 %    Gran # (ANC) 11.6 (H) 1.8 - 7.7 K/uL    Immature Grans (Abs) 0.06 (H) 0.00 - 0.04 K/uL    Lymph # 1.3 1.0 - 4.8 K/uL    Mono # 1.2 (H) 0.3 - 1.0 K/uL    Eos # 0.0 0.0 - 0.5 K/uL    Baso # 0.07 0.00 - 0.20 K/uL    nRBC 0 0 /100 WBC    Gran% 81.9 (H) 38.0 - 73.0 %    Lymph% 9.0 (L) 18.0 - 48.0 %    Mono% 8.1 4.0 - 15.0 %    Eosinophil% 0.1 0.0 - 8.0 %    Basophil% 0.5 0.0 - 1.9 %    Differential Method Automated    POCT glucose    Collection Time: 11/29/18  4:58 AM   Result Value Ref Range    POCT Glucose 110 70 - 110 mg/dL         Diagnostic Results       MRI brain 11/28/18:  -Moderate size regions of signal abnormality left MCA territory compatible with recent infarction.   -Please note small component of hemorrhage within the left mesial temporal component of infarction.  -In addition there is superimposed small region of recent infarction  in the right inferior frontal lobe.  -Generalized cerebral volume loss with scattered T2 FLAIR signal hyperintensity supratentorial white matter and lilly concerning for underlying chronic ischemic change.  -No evidence for hydrocephalus with left middle cranial fossa probable rack node cyst        Brain imaging:  CT Head. Date: 11/27/18  No CT evidence of acute intracranial abnormality.    Vessel Imaging:  CTA Multiphase. Date: 11/27/18  Partial opacification of the left cervical ICA, without meaningful opacification of the ICA distally or intracranially, findings suggests thrombus, either within the vessel itself, or the carotid terminus.  There is some flow within the distal M2 and M3 branches on the left, with improved flow on phase 2 and face 3 however no meaningful flow is identified within the M1 segment on the left.  These findings likely correlate with vague hypoattenuation seen within the region of the left basal ganglia and partially left caudate/subinsular region.    No additional regions of high-grade stenosis or occlusion, please see above for full details.       Cardiac Evaluation:   TTE 11/28/18:  · Severe left atrial enlargement.  · Mildly decreased left ventricular systolic function. The estimated ejection fraction is 45%  · Septal wall has abnormal motion.  · Left ventricular diastolic dysfunction.  · Moderate-to-severe mitral regurgitation.  · Moderate to severe tricuspid regurgitation.  · The estimated PA systolic pressure is 45.77 mm Hg  · Normal central venous pressure (3 mm Hg).

## 2018-11-29 NOTE — PLAN OF CARE
Problem: Patient Care Overview  Goal: Plan of Care Review  Outcome: Ongoing (interventions implemented as appropriate)  POC reviewed with pt and family at 1400. Pt unable to verbalize understanding. Questions and concerns addressed. Pt transported fr procedure today. Pt tolerated imaging well. NGT inserted. Meds to be given via tube upon approval. Pt neuro exam consistently the same. NIH to be completed by night nurse with initial neuro assessment. Pt to remain NPO. No other changes made to POC. Pt progressing toward goals. Will continue to monitor. See flowsheets for full assessment and VS info.

## 2018-11-29 NOTE — ASSESSMENT & PLAN NOTE
-- Continue Neuro checks q 1hr  -- Vascular Neurology consulted  -- CTA multiphase pending  -- SBP goal <140  -- PT/OT/Speech  -- CXR  -- Electrolyte replace prn  -- Tici 2C reperfusion   -- 11/28 Mri stable  -- 11/29 F/u CTH in am  -- 11/29 F/u CxR

## 2018-11-29 NOTE — ASSESSMENT & PLAN NOTE
-- Metoprolol continued  -- Rate controlled    Detail Level: Simple Note Text (......Xxx Chief Complaint.): This diagnosis correlates with the

## 2018-11-30 LAB
ALBUMIN SERPL BCP-MCNC: 2.8 G/DL
ALLENS TEST: ABNORMAL
ALP SERPL-CCNC: 170 U/L
ALT SERPL W/O P-5'-P-CCNC: 26 U/L
ANION GAP SERPL CALC-SCNC: 10 MMOL/L
APTT BLDCRRT: 47.5 SEC
AST SERPL-CCNC: 61 U/L
BASOPHILS # BLD AUTO: 0.03 K/UL
BASOPHILS NFR BLD: 0.2 %
BILIRUB SERPL-MCNC: 0.7 MG/DL
BUN SERPL-MCNC: 32 MG/DL
CALCIUM SERPL-MCNC: 8.7 MG/DL
CHLORIDE SERPL-SCNC: 104 MMOL/L
CO2 SERPL-SCNC: 27 MMOL/L
CREAT SERPL-MCNC: 0.7 MG/DL
DIFFERENTIAL METHOD: ABNORMAL
EOSINOPHIL # BLD AUTO: 0 K/UL
EOSINOPHIL NFR BLD: 0 %
ERYTHROCYTE [DISTWIDTH] IN BLOOD BY AUTOMATED COUNT: 13.3 %
EST. GFR  (AFRICAN AMERICAN): >60 ML/MIN/1.73 M^2
EST. GFR  (NON AFRICAN AMERICAN): >60 ML/MIN/1.73 M^2
GLUCOSE SERPL-MCNC: 120 MG/DL
HCO3 UR-SCNC: 29.4 MMOL/L (ref 24–28)
HCT VFR BLD AUTO: 34.5 %
HGB BLD-MCNC: 10.7 G/DL
IMM GRANULOCYTES # BLD AUTO: 0.14 K/UL
IMM GRANULOCYTES NFR BLD AUTO: 0.8 %
INR PPP: 1.1
LYMPHOCYTES # BLD AUTO: 1.5 K/UL
LYMPHOCYTES NFR BLD: 8.2 %
MAGNESIUM SERPL-MCNC: 2.4 MG/DL
MCH RBC QN AUTO: 28.8 PG
MCHC RBC AUTO-ENTMCNC: 31 G/DL
MCV RBC AUTO: 93 FL
MONOCYTES # BLD AUTO: 1.5 K/UL
MONOCYTES NFR BLD: 8.2 %
NEUTROPHILS # BLD AUTO: 14.7 K/UL
NEUTROPHILS NFR BLD: 82.6 %
NRBC BLD-RTO: 0 /100 WBC
PCO2 BLDA: 36.3 MMHG (ref 35–45)
PH SMN: 7.52 [PH] (ref 7.35–7.45)
PHOSPHATE SERPL-MCNC: 4.1 MG/DL
PLATELET # BLD AUTO: 502 K/UL
PMV BLD AUTO: 9.7 FL
PO2 BLDA: 164 MMHG (ref 80–100)
POC BE: 6 MMOL/L
POC SATURATED O2: 100 % (ref 95–100)
POC TCO2: 30 MMOL/L (ref 23–27)
POCT GLUCOSE: 108 MG/DL (ref 70–110)
POCT GLUCOSE: 109 MG/DL (ref 70–110)
POCT GLUCOSE: 150 MG/DL (ref 70–110)
POCT GLUCOSE: 154 MG/DL (ref 70–110)
POTASSIUM SERPL-SCNC: 3.7 MMOL/L
PROT SERPL-MCNC: 6.1 G/DL
PROTHROMBIN TIME: 11.6 SEC
RBC # BLD AUTO: 3.71 M/UL
SAMPLE: ABNORMAL
SITE: ABNORMAL
SODIUM SERPL-SCNC: 141 MMOL/L
WBC # BLD AUTO: 17.82 K/UL

## 2018-11-30 PROCEDURE — 86580 TB INTRADERMAL TEST: CPT | Performed by: NURSE PRACTITIONER

## 2018-11-30 PROCEDURE — 82803 BLOOD GASES ANY COMBINATION: CPT

## 2018-11-30 PROCEDURE — 99233 SBSQ HOSP IP/OBS HIGH 50: CPT | Mod: ,,, | Performed by: PSYCHIATRY & NEUROLOGY

## 2018-11-30 PROCEDURE — 25000242 PHARM REV CODE 250 ALT 637 W/ HCPCS: Performed by: NURSE PRACTITIONER

## 2018-11-30 PROCEDURE — 99900035 HC TECH TIME PER 15 MIN (STAT)

## 2018-11-30 PROCEDURE — B41F1ZZ FLUOROSCOPY OF RIGHT LOWER EXTREMITY ARTERIES USING LOW OSMOLAR CONTRAST: ICD-10-PCS | Performed by: RADIOLOGY

## 2018-11-30 PROCEDURE — 27000221 HC OXYGEN, UP TO 24 HOURS

## 2018-11-30 PROCEDURE — 36600 WITHDRAWAL OF ARTERIAL BLOOD: CPT

## 2018-11-30 PROCEDURE — 85025 COMPLETE CBC W/AUTO DIFF WBC: CPT

## 2018-11-30 PROCEDURE — B3141ZZ FLUOROSCOPY OF LEFT COMMON CAROTID ARTERY USING LOW OSMOLAR CONTRAST: ICD-10-PCS | Performed by: RADIOLOGY

## 2018-11-30 PROCEDURE — 85610 PROTHROMBIN TIME: CPT

## 2018-11-30 PROCEDURE — 80053 COMPREHEN METABOLIC PANEL: CPT

## 2018-11-30 PROCEDURE — 94667 MNPJ CHEST WALL 1ST: CPT

## 2018-11-30 PROCEDURE — 25000003 PHARM REV CODE 250: Performed by: PHYSICIAN ASSISTANT

## 2018-11-30 PROCEDURE — 03CL3Z7 EXTIRPATION OF MATTER FROM LEFT INTERNAL CAROTID ARTERY USING STENT RETRIEVER, PERCUTANEOUS APPROACH: ICD-10-PCS | Performed by: RADIOLOGY

## 2018-11-30 PROCEDURE — A4216 STERILE WATER/SALINE, 10 ML: HCPCS | Performed by: NURSE PRACTITIONER

## 2018-11-30 PROCEDURE — 25000003 PHARM REV CODE 250: Performed by: NURSE PRACTITIONER

## 2018-11-30 PROCEDURE — 63600175 PHARM REV CODE 636 W HCPCS: Performed by: NURSE PRACTITIONER

## 2018-11-30 PROCEDURE — 20000000 HC ICU ROOM

## 2018-11-30 PROCEDURE — 99232 SBSQ HOSP IP/OBS MODERATE 35: CPT | Mod: GC,,, | Performed by: INTERNAL MEDICINE

## 2018-11-30 PROCEDURE — 85730 THROMBOPLASTIN TIME PARTIAL: CPT

## 2018-11-30 PROCEDURE — 94640 AIRWAY INHALATION TREATMENT: CPT

## 2018-11-30 PROCEDURE — 92507 TX SP LANG VOICE COMM INDIV: CPT

## 2018-11-30 PROCEDURE — B3171ZZ FLUOROSCOPY OF LEFT INTERNAL CAROTID ARTERY USING LOW OSMOLAR CONTRAST: ICD-10-PCS | Performed by: RADIOLOGY

## 2018-11-30 PROCEDURE — 94761 N-INVAS EAR/PLS OXIMETRY MLT: CPT

## 2018-11-30 PROCEDURE — 84100 ASSAY OF PHOSPHORUS: CPT

## 2018-11-30 PROCEDURE — 92526 ORAL FUNCTION THERAPY: CPT

## 2018-11-30 PROCEDURE — 83735 ASSAY OF MAGNESIUM: CPT

## 2018-11-30 PROCEDURE — 97110 THERAPEUTIC EXERCISES: CPT

## 2018-11-30 RX ORDER — METOPROLOL TARTRATE 50 MG/1
50 TABLET ORAL EVERY 6 HOURS
Status: DISCONTINUED | OUTPATIENT
Start: 2018-11-30 | End: 2018-12-03

## 2018-11-30 RX ORDER — LANOLIN ALCOHOL/MO/W.PET/CERES
800 CREAM (GRAM) TOPICAL
Status: DISCONTINUED | OUTPATIENT
Start: 2018-11-30 | End: 2018-12-01

## 2018-11-30 RX ORDER — SODIUM,POTASSIUM PHOSPHATES 280-250MG
2 POWDER IN PACKET (EA) ORAL
Status: DISCONTINUED | OUTPATIENT
Start: 2018-11-30 | End: 2018-12-01

## 2018-11-30 RX ORDER — POTASSIUM CHLORIDE 20 MEQ/15ML
60 SOLUTION ORAL
Status: DISCONTINUED | OUTPATIENT
Start: 2018-11-30 | End: 2018-12-01

## 2018-11-30 RX ORDER — GLUCAGON 1 MG
1 KIT INJECTION
Status: DISCONTINUED | OUTPATIENT
Start: 2018-12-01 | End: 2018-12-04 | Stop reason: HOSPADM

## 2018-11-30 RX ORDER — INSULIN ASPART 100 [IU]/ML
0-5 INJECTION, SOLUTION INTRAVENOUS; SUBCUTANEOUS EVERY 4 HOURS PRN
Status: DISCONTINUED | OUTPATIENT
Start: 2018-12-01 | End: 2018-12-04 | Stop reason: HOSPADM

## 2018-11-30 RX ORDER — METOPROLOL TARTRATE 50 MG/1
50 TABLET ORAL 4 TIMES DAILY
Status: DISCONTINUED | OUTPATIENT
Start: 2018-11-30 | End: 2018-11-30

## 2018-11-30 RX ORDER — POTASSIUM CHLORIDE 20 MEQ/15ML
40 SOLUTION ORAL
Status: DISCONTINUED | OUTPATIENT
Start: 2018-11-30 | End: 2018-12-01

## 2018-11-30 RX ORDER — IPRATROPIUM BROMIDE AND ALBUTEROL SULFATE 2.5; .5 MG/3ML; MG/3ML
3 SOLUTION RESPIRATORY (INHALATION) EVERY 6 HOURS
Status: DISCONTINUED | OUTPATIENT
Start: 2018-11-30 | End: 2018-12-04 | Stop reason: HOSPADM

## 2018-11-30 RX ADMIN — ATORVASTATIN CALCIUM 40 MG: 20 TABLET, FILM COATED ORAL at 10:11

## 2018-11-30 RX ADMIN — METOPROLOL TARTRATE 50 MG: 50 TABLET ORAL at 08:11

## 2018-11-30 RX ADMIN — METOPROLOL TARTRATE 50 MG: 50 TABLET ORAL at 02:11

## 2018-11-30 RX ADMIN — METOPROLOL TARTRATE 50 MG: 50 TABLET ORAL at 11:11

## 2018-11-30 RX ADMIN — METOPROLOL TARTRATE 25 MG: 25 TABLET, FILM COATED ORAL at 10:11

## 2018-11-30 RX ADMIN — Medication 3 ML: at 02:11

## 2018-11-30 RX ADMIN — Medication 5 UNITS: at 03:11

## 2018-11-30 RX ADMIN — STANDARDIZED SENNA CONCENTRATE AND DOCUSATE SODIUM 2 TABLET: 8.6; 5 TABLET, FILM COATED ORAL at 10:11

## 2018-11-30 RX ADMIN — IPRATROPIUM BROMIDE AND ALBUTEROL SULFATE 3 ML: .5; 3 SOLUTION RESPIRATORY (INHALATION) at 11:11

## 2018-11-30 RX ADMIN — IPRATROPIUM BROMIDE AND ALBUTEROL SULFATE 3 ML: .5; 3 SOLUTION RESPIRATORY (INHALATION) at 08:11

## 2018-11-30 NOTE — PT/OT/SLP PROGRESS
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 Team may wish to consider more long term means of nutrition, hydration, and medication.   General Precautions: Standard, aspiration, aphasia, fall  Communication strategies:  go to room if call light pushed                Subjective     Daughter present; nonverbal.   Mittens in place.   Pain/Comfort:  Pain Rating 1: 0/10    Objective:     Has the patient been evaluated by SLP for swallowing?   Yes  Keep patient NPO? Yes   Current Respiratory Status: nasal cannula      Patient seen for ongoing dysphagia / cognitive linguistic therapy. Patient with poor tolerance of ice chip trial x1 which revealed oral stasis, anterior spillage, and absent pharyngeal swallow. Patient demonstrated what appeared to be attempted oral expectoration of trial.  Suctioning provided for full oral cavity clearance. No further PO trials attempted this date. Patient did not repeat vowels via max A provided.  No response elicited via any modality during simple yes/no tasks despite max A.  Patient followed basic single step commands with 90% acc given direct model by ST. Skilled education was provided to patient and family member regarding diet recs, aspiration precautions, and ongoing ST plan of care. Patient with no evidence of learning, family member verbalized understanding. Patient left in room with family member at bedside.     Assessment:     Apple Watson is a 89 y.o. female with an SLP diagnosis of Aphasia and Dysphagia.      Goals:   Multidisciplinary Problems     SLP Goals        Problem: SLP Goal    Goal Priority Disciplines Outcome   SLP  Goal     SLP    Description:  Speech Language Pathology Goals  Goals expected to be met by 12/5  1. Ongoing swallow assessment.   2. Pt will elicit verbalization x3 to automatic speech tasks.   3. Pt will follow simple commands x3.  4. Pt will respond via any modality to simple y/n questions.   5. Ongoing cognitive /linguistic assessment as appropriate.                        Plan:     · Patient to be seen:  4 x/week   · Plan of Care expires:  12/27/18  · Plan of Care reviewed with:  patient, family   · SLP Follow-Up:  Yes       Discharge recommendations:  nursing facility, skilled   Barriers to Discharge:  None    Time Tracking:     SLP Treatment Date:   11/30/18  Speech Start Time:  1300  Speech Stop Time:  1316     Speech Total Time (min):  16 min    Billable Minutes: Speech Therapy Individual 8 and Treatment Swallowing Dysfunction 8    Emily Abadie, CCC-SLP  11/30/2018

## 2018-11-30 NOTE — PLAN OF CARE
Problem: Patient Care Overview  Goal: Plan of Care Review  Outcome: Ongoing (interventions implemented as appropriate)  POC reviewed with pt and daughter at 0500. Pt unable to verbalize understanding d/t underlying condition. Questions and concerns addressed with daughter. TF started tonight; continued at 20cc/hr with goal of 40 cc/hr; no residuals. Heparin gtt continued at 14 units/kg/hr. Pt pulled out IV; mitts placed on both hands for pt safety. Pt had 4-5 dark brown, loose bowel movements; flexi placed. Pt transferred to CT @ 0300 with 2 RNs, portable monitor, ambu bag, O2; tolerated well and VSS. Pt progressing toward goals. Will continue to monitor. See flowsheets for full assessment and VS info

## 2018-11-30 NOTE — SUBJECTIVE & OBJECTIVE
Neurologic Chief Complaint: L ICA terminus occlusion  RSW, RFD, L-gaze, Mute    Subjective:     Interval History: Patient is seen for follow-up neurological assessment and treatment recommendations: Continue heparin gtt for 48h and rate control with metoprolol. PT/OT/SLP. Switch to NOAC or warfarin.    HPI, Past Medical, Family, and Social History remains the same as documented in the initial encounter.     Review of Systems   Constitutional: Negative for chills and fever.   Respiratory: Negative for cough.    Cardiovascular: Negative for chest pain.   Gastrointestinal: Negative for nausea and vomiting.   Neurological: Positive for facial asymmetry, speech difficulty and weakness.     Scheduled Meds:   atorvastatin  40 mg Oral Daily    metoprolol tartrate  25 mg Per NG tube QID    senna-docusate 8.6-50 mg  2 tablet Per NG tube BID    sodium chloride 0.9%  3 mL Intravenous Q8H     Continuous Infusions:   heparin (porcine) in D5W 14 Units/kg/hr (11/30/18 0800)     PRN Meds:acetaminophen, calcium gluconate IVPB, calcium gluconate IVPB, calcium gluconate IVPB, magnesium sulfate IVPB, magnesium sulfate IVPB, ondansetron, sodium chloride 0.9%    Objective:     Vital Signs (Most Recent):  Temp: 98.1 °F (36.7 °C) (11/30/18 0701)  Pulse: 110 (11/30/18 0701)  Resp: (!) 37 (11/30/18 0600)  BP: (!) 141/96 (11/30/18 0701)  SpO2: 100 % (11/30/18 0701)  BP Location: Right arm    Vital Signs Range (Last 24H):  Temp:  [97.6 °F (36.4 °C)-98.3 °F (36.8 °C)]   Pulse:  []   Resp:  [18-37]   BP: (111-156)/()   SpO2:  [98 %-100 %]   BP Location: Right arm    Physical Exam   Constitutional: She appears well-developed and well-nourished. No distress.   HENT:   Head: Normocephalic and atraumatic.   Eyes: Pupils are equal, round, and reactive to light.   Cardiovascular: Normal rate.   Irregularly irregular  Tachycardic   Pulmonary/Chest: Effort normal. No stridor. No respiratory distress. She has no wheezes.   Abdominal: She  exhibits no mass. There is no guarding.   Neurological: She is alert.   Skin: Skin is warm and dry. Capillary refill takes less than 2 seconds.   Nursing note and vitals reviewed.      Neurological Exam:     LOC: alert  Attention Span: Good   Language: mute   Articulation: mute  Orientation: Not oriented to time  Visual Fields: left gaze  EOM (CN III, IV, VI): Full/intact  Pupils (CN II, III): PERRL  Facial Sensation (CN V): Normal  Facial Movement (CN VII): Symmetric facial expression    Motor:   Arm left  Normal 4/5  Leg left  Normal 2/5  Arm right  Normal 3/5  Leg right Normal 1/5  Sensation: unable  Tone: Normal tone throughout  Reflexes +2 throughout               Laboratory:    Recent Results (from the past 24 hour(s))   POCT glucose    Collection Time: 11/29/18 11:34 AM   Result Value Ref Range    POCT Glucose 119 (H) 70 - 110 mg/dL   POCT glucose    Collection Time: 11/30/18 12:25 AM   Result Value Ref Range    POCT Glucose 108 70 - 110 mg/dL   Comprehensive metabolic panel    Collection Time: 11/30/18  3:49 AM   Result Value Ref Range    Sodium 141 136 - 145 mmol/L    Potassium 3.7 3.5 - 5.1 mmol/L    Chloride 104 95 - 110 mmol/L    CO2 27 23 - 29 mmol/L    Glucose 120 (H) 70 - 110 mg/dL    BUN, Bld 32 (H) 8 - 23 mg/dL    Creatinine 0.7 0.5 - 1.4 mg/dL    Calcium 8.7 8.7 - 10.5 mg/dL    Total Protein 6.1 6.0 - 8.4 g/dL    Albumin 2.8 (L) 3.5 - 5.2 g/dL    Total Bilirubin 0.7 0.1 - 1.0 mg/dL    Alkaline Phosphatase 170 (H) 55 - 135 U/L    AST 61 (H) 10 - 40 U/L    ALT 26 10 - 44 U/L    Anion Gap 10 8 - 16 mmol/L    eGFR if African American >60.0 >60 mL/min/1.73 m^2    eGFR if non African American >60.0 >60 mL/min/1.73 m^2   CBC auto differential    Collection Time: 11/30/18  3:49 AM   Result Value Ref Range    WBC 17.82 (H) 3.90 - 12.70 K/uL    RBC 3.71 (L) 4.00 - 5.40 M/uL    Hemoglobin 10.7 (L) 12.0 - 16.0 g/dL    Hematocrit 34.5 (L) 37.0 - 48.5 %    MCV 93 82 - 98 fL    MCH 28.8 27.0 - 31.0 pg    MCHC  31.0 (L) 32.0 - 36.0 g/dL    RDW 13.3 11.5 - 14.5 %    Platelets 502 (H) 150 - 350 K/uL    MPV 9.7 9.2 - 12.9 fL    Immature Granulocytes 0.8 (H) 0.0 - 0.5 %    Gran # (ANC) 14.7 (H) 1.8 - 7.7 K/uL    Immature Grans (Abs) 0.14 (H) 0.00 - 0.04 K/uL    Lymph # 1.5 1.0 - 4.8 K/uL    Mono # 1.5 (H) 0.3 - 1.0 K/uL    Eos # 0.0 0.0 - 0.5 K/uL    Baso # 0.03 0.00 - 0.20 K/uL    nRBC 0 0 /100 WBC    Gran% 82.6 (H) 38.0 - 73.0 %    Lymph% 8.2 (L) 18.0 - 48.0 %    Mono% 8.2 4.0 - 15.0 %    Eosinophil% 0.0 0.0 - 8.0 %    Basophil% 0.2 0.0 - 1.9 %    Differential Method Automated    Magnesium    Collection Time: 11/30/18  3:49 AM   Result Value Ref Range    Magnesium 2.4 1.6 - 2.6 mg/dL   Phosphorus    Collection Time: 11/30/18  3:49 AM   Result Value Ref Range    Phosphorus 4.1 2.7 - 4.5 mg/dL   Protime-INR    Collection Time: 11/30/18  3:49 AM   Result Value Ref Range    Prothrombin Time 11.6 9.0 - 12.5 sec    INR 1.1 0.8 - 1.2   APTT    Collection Time: 11/30/18  3:49 AM   Result Value Ref Range    aPTT 47.5 (H) 21.0 - 32.0 sec   POCT glucose    Collection Time: 11/30/18  6:08 AM   Result Value Ref Range    POCT Glucose 109 70 - 110 mg/dL         Diagnostic Results       Brain imaging:  CT Head. Date: 11/30/18 @0300  Continued temporal evolution of left MCA territory distribution acute infarct.  No evidence of midline shift or new large parenchymal hemorrhage.      MRI brain 11/28/18:  -Moderate size regions of signal abnormality left MCA territory compatible with recent infarction.   -Please note small component of hemorrhage within the left mesial temporal component of infarction.  -In addition there is superimposed small region of recent infarction in the right inferior frontal lobe.  -Generalized cerebral volume loss with scattered T2 FLAIR signal hyperintensity supratentorial white matter and lilly concerning for underlying chronic ischemic change.  -No evidence for hydrocephalus with left middle cranial fossa probable  rack node cyst      CT Head. Date: 11/27/18  No CT evidence of acute intracranial abnormality.    Vessel Imaging:  CTA Multiphase. Date: 11/27/18  Partial opacification of the left cervical ICA, without meaningful opacification of the ICA distally or intracranially, findings suggests thrombus, either within the vessel itself, or the carotid terminus.  There is some flow within the distal M2 and M3 branches on the left, with improved flow on phase 2 and face 3 however no meaningful flow is identified within the M1 segment on the left.  These findings likely correlate with vague hypoattenuation seen within the region of the left basal ganglia and partially left caudate/subinsular region.    No additional regions of high-grade stenosis or occlusion, please see above for full details.       Cardiac Evaluation:   TTE 11/28/18:  · Severe left atrial enlargement.  · Mildly decreased left ventricular systolic function. The estimated ejection fraction is 45%  · Septal wall has abnormal motion.  · Left ventricular diastolic dysfunction.  · Moderate-to-severe mitral regurgitation.  · Moderate to severe tricuspid regurgitation.  · The estimated PA systolic pressure is 45.77 mm Hg  · Normal central venous pressure (3 mm Hg).

## 2018-11-30 NOTE — PLAN OF CARE
SW met with Pt daughter at bedside. Discussed list given to her siblings and need for SNF choices. She was not aware of the list and stated they would likely not want [facility] again. They are interested in closer options. They will have choices for SNF on Monday.    Laura Lorenzo, SHRUTI  Neurocritical Care   [facility]  [id number]

## 2018-11-30 NOTE — PT/OT/SLP EVAL
Physical Therapy Evaluation     Patient Name: Apple Watson  MRN: [id number]   Diagnosis: Embolic stroke involving left carotid artery    Recommendations:   Discharge Recommendations:  nursing facility, skilled   Discharge Equipment Recommendations: (TBD)   Barriers to Discharge: decreased caregiver assistance, change in functional mobility    Plan:   During this hospitalization, patient will be seen 5 x/week for gait training, therapeutic activities, therapeutic exercises, neuromuscular re-education, wheelchair management/training to address impairments and functional mobility deficits.   · Plan of Care Expires: 11/29/18   Plan of Care Reviewed with: patient, daughter    This plan of care has been discussed with the patient and/or family who were involved in its development and are in agreement with the identified goals and treatment plan.     History:     Past Medical History:   Diagnosis Date    Atrial fibrillation with RVR 11/28/2018    Depression     Hypertension     Neuropathy        Past Surgical History:   Procedure Laterality Date    APPENDECTOMY      ARTHROPLASTY-HIP-NOLBERTO Right 11/10/2018    Performed by Nasir Danielle MD at [facility] OR    GALLBLADDER SURGERY      HEMIARTHROPLASTY OF HIP Right 11/10/2018    Procedure: ARTHROPLASTY-HIP-NOLBERTO;  Surgeon: Nasir Danielle MD;  Location: [facility] OR;  Service: Orthopedics;  Laterality: Right;  Depuy Bipolar  LAteral positioner with positioners    HYSTERECTOMY      TONSILLECTOMY         Subjective   Patient comments/goals: non verbal; set by therapist  Pain/Comfort:  ·  Pain Rating 1: 0/10  · Pain Rating Post-Intervention 1: 0/10     Living Environment:  Home: The patient lives with her dtr in a 1 story home with 1 step(s) to enter.   PLOF:  Modified independent using a TRINY s/p recent R BRANDIN 11/9/2018  DME owned: [employer], w/c.    Assistance Available: Upon discharge, patient will have assistance from her dtr.    Objective:   The patient had bed alarm,  blood pressure cuff, pulse ox (continuous), telemetry, pressure relief boots, SCD, peripheral IV, PureWick    General Precautions: aphasia, aspiration, fall(R posterior hip precautions)  Recent Surgery: * No surgery found *    Recent Vital Signs:     Physical Examination:   The patient was found supine in ICU with dtr at bedside.  Her HR was elevated and RN approved in bed activities only.  Patient participated in RLE exercises.  When attempting rolling R, monitor alarmed Vtach. RN to bedside but patient in NAD and alarm stopped.  HR 130bpm at that time.  No additional bed mobility was attempted.     Cognitive Function:  - Oriented to: non verbal  - Level of Alertness: awake  - Follows Commands/attention: Inattentive  - Communication: aphasia  - Safety awareness/insight to disability: impaired  Musculoskeletal System  Upper Extremities:   PROM: WFL  Strength: RUE hemiparesis  Lower Extremities:  PROM: WFL, recent R BRANDIN (PROM 0-90)  Strength: RLE grossly 1-2/5; LLE WFL  Integumentary System: R hip incision clean, dry, intact   Cardiopulmonary System:   · HR: 109-130 bpm  Neuromuscular System:  · Sensation: unable to assess d/t cognition  · Coordination: HERMANN  Posture and gross symmetry: unable to assess (supine only)  Vision:  L gaze preference; able to cross midline with verbal cues    BALANCE: NT d/t vitals response to activity    FUNCTIONAL MOBILITY ASSESSMENT:  Bed Mobility: performed with HOB flat  · Rolling/Turning R: Total assistance with monitor alarming Vtach  · Rolling/Turning L: total assistance  · Supine > sit: NT  · Sit > supine: NT  · Scooting EOB: NT    Therapeutic Activities, Education, or Exercises:  Supine RLE AAROM 15x/ea:  - heel slides  - hip ABD/ADD  - ankle pumps  - SLR    The therapist educated the patient and her dtr on the role of PT, POC, and therapy recommendations of SNF.  The therapist discussed the patients current mobility status, deficits, and level of assistance with dtr.Time was  provided for active listening, discussion of health disposition, and discussion of safe discharge recommendations. Therapist answered questions to patient/familys satisfaction within scope of practice.  Patient and family are aware of patient's deficits and therapy progression. White board updated to reflect current level of assistance.    FUNCTIONAL OUTCOME MEASURES:  [facility]  Turning over in bed (including adjusting bedclothes, sheets and blankets)?: 1  Sitting down on and standing up from a chair with arms (e.g., wheelchair, bedside commode, etc.): 1  Moving from lying on back to sitting on the side of the bed?: 1  Moving to and from a bed to a chair (including a wheelchair)?: 1  Need to walk in hospital room?: 1  Climbing 3-5 steps with a railing?: 1  Basic Mobility Total Score: 6    Goals:     Multidisciplinary Problems     Physical Therapy Goals        Problem: Physical Therapy Goal    Goal Priority Disciplines Outcome Goal Variances Interventions   Physical Therapy Goal     PT, PT/OT Ongoing (interventions implemented as appropriate)     Description:    Goals to be met by 12/9/2018    1. Pt will perform rolling to the R and L with moderate assistance.   2. Pt will perform supine to sit from both sides of the bed with moderate assistance.  3. Pt will perform sit to supine with moderate assistance.  4. Pt will perform sit to stand transfers with max assistance.    5. Pt will perform bed <> chair transfers with max assistance.  6. Pt will perform gait x 10 feet with max assistance using RW.  7. Pt will sit EOB x 5 minutes with min assistance and no LOB to prepare for functional tasks in sitting.                   Assessment:   Apple Watson is a 89 y.o. female admitted with a medical diagnosis of Embolic stroke involving left carotid artery.  Prior to admit she was modified independent using a RW and was receiving HH therapy for recent R BRANDIN.  she now presents with the following impairments/functional  limitations: weakness, impaired functional mobilty, impaired cognition, impaired cardiopulmonary response to activity, impaired endurance, gait instability, impaired fine motor, decreased upper extremity function, decreased lower extremity function, visual deficits, impaired self care skills, orthopedic precautions, edema.   Due to these impairments, she now requires total assistance for all functional mobility and had abnormal response of vitals to activity. She has several co morbidities that are affecting her full participation in therapy and are expected to slow her rate of progress.  She is definitely in need of skilled PT services at this time.  Recommend SNF after discharge.     Problem List:  weakness, impaired functional mobilty, impaired cognition, impaired cardiopulmonary response to activity, impaired endurance, gait instability, impaired fine motor, decreased upper extremity function, decreased lower extremity function, visual deficits, impaired self care skills, orthopedic precautions, edema  Rehab Prognosis:  fair.  The patient would benefit from acute skilled PT services to address these deficits and maximize their functional independence.    Clinical Decision Making:   The patient presents with 4 or more elements from any of the following: body structures and functions, activity limitations, and/or participation restrictions per standardized tests and measures.  The patient's current clinical presentation is considered unstable with unpredictable characteristics d/t medical acuity and/or medical stability in the acute care setting.   Evaluation Complexity:  High - 18835     Time Tracking:   PT Received On:  11/29/18  PT Start Time:   1500    PT Stop Time:  1530  PT Total Time (min): 30 min      Billable Minutes: Evaluation 20 and Therapeutic Exercise 10    Heather Chino, PT  10/02/2018  [phone] (pager)

## 2018-11-30 NOTE — ASSESSMENT & PLAN NOTE
Apple Watson is a 89 y.o. female with PMHx of HTN and new onset a fib who presented to OSH with L MCA syndrome. She was treated with tPA and [place] [facility]A with L ICA occlusion, patient taken to IR for possible intervention.     11/29/18: Patient doing much better, able to give thumbs up with both hands, and nodding head appropriately.   11/30/18: Patient not following commands like she was yesterday. More obtunded and lethargic.   11/30 @0300 CT head showed stable, evolving infarct    Recommendations:  -Rate control afib with metoprolol  -Continue heparin gtt for 48hrs, switch to NOAC or bridge to warfarin   -Continue Atrov 40 mg dialy   -SBP goal 140-180  -Aggressive PT/OT/SLP          Antithrombotics for secondary stroke prevention:  Heparin gtt for 48hrs    Statins for secondary stroke prevention and hyperlipidemia, if present:   Statins: Atorvastatin- 40 mg daily    Aggressive risk factor modification: HTN, A-Fib     Rehab efforts: Occupational Therapy, PT/OT/SLP to evaluate and treat, PM&R consult     Diagnostics ordered/pending: None    VTE prophylaxis: None: Reason for No Pharmacological VTE Prophylaxis: Mechanical prophylaxis: Place SCDs    BP parameters: Infarct: Post tPA, SBP <180

## 2018-11-30 NOTE — ASSESSMENT & PLAN NOTE
- CHADSVASC 6 -  Continue anticoagulation and convert to OAC when able  - Currently on lopressor 25 QID. Titrate up gradually until HR <110

## 2018-11-30 NOTE — PROGRESS NOTES
[facility]  Vascular Neurology  Comprehensive Stroke Center  Progress Note    Assessment/Plan:     * Embolic stroke involving left carotid artery    Apple Watson is a 89 y.o. female with PMHx of HTN and new onset a fib who presented to OSH with L MCA syndrome. She was treated with tPA and transferred to [facility]. CTA with L ICA occlusion, patient taken to IR for possible intervention.     11/29/18: Patient doing much better, able to give thumbs up with both hands, and nodding head appropriately.   11/30/18: Patient not following commands like she was yesterday, but is very tired from agitation/not sleeping last night. More interactive for NCC team.  11/30 @0300 CT head showed stable, evolving infarct    Recommendations:  -Rate control afib with metoprolol  -Continue heparin gtt for 48hrs, switch to NOAC   -Continue Atrov 40 mg dialy   -SBP goal 140-180  -Aggressive PT/OT/SLP          Antithrombotics for secondary stroke prevention:  Heparin gtt for 48hrs    Statins for secondary stroke prevention and hyperlipidemia, if present:   Statins: Atorvastatin- 40 mg daily    Aggressive risk factor modification: HTN, A-Fib     Rehab efforts: Occupational Therapy, PT/OT/SLP to evaluate and treat, PM&R consult     Diagnostics ordered/pending: None    VTE prophylaxis: None: Reason for No Pharmacological VTE Prophylaxis: Mechanical prophylaxis: Place SCDs    BP parameters: Infarct: Post tPA, SBP <180         Atrial fibrillation with RVR    First noted on EKG post hip replacement surgery, 11/11/18, No anticoagulation was initiated at that time.   A fib noted on monitor on arrival in ED, EKG obtained confirmed afib RVR  Will need to discuss secondary stroke prevention with patient and family    Atrial Fibrillation Assessment ([id number]):       Condition Points    C   Congestive heart failure (or Left ventricular systolic dysfunction) 0    H Hypertension: blood pressure consistently above 140/90 mmHg (or  treated hypertension on medication) 1    A2  Age ?75 years 2    D  Diabetes Mellitus 0    S2  Prior Stroke or TIA or Thromboembolism  2    V  Vascular disease (e.g. peripheral artery disease, myocardial infarction, aortic plaque) ?    A  Age 65-74 years 0    Sc  Sex category (i.e. female sex) 1                                                                                  [id number] Score: 6  [id number]-INLm Score Stroke Risk %   6 9.8                                                                                  Annual Stroke Risk:  9.8%    Score Risk Anticoagulation Therapy Considerations   2 or greater High Oral anticoagulant Oral anticoagulant, using either a new oral anticoagulant drug   (apixaban, rivaroxaban or dabigatran) or well controlled Warfarin at INR 2.0-3.0             Recommendation:     -[id number] score of 6= 9.% risk.    -If transition to warfarin, would likely need heparin bridge   -Would begin patient on AC prior to discharge, if no other contraindication.    -Rate control with Metoprolol tartrate while inpatient and transition to 24h metoprolol succinate prior to d/c.    -Cardiology following              Received intravenous tissue plasminogen activator (tPA) in emergency department    Admitting to neuro ICU for post tPA monitoring     Atrial fibrillation    Stroke risk factor  First noted on EKG post hip replacement surgery, 11/11/18  No anticoagulation initiated  Rate treated with metoprolol  Patient returned to normal sinus rhythm spontaneously  A fib noted on monitor on arrival in ED, EKG obtained to confirm  Will need to discuss secondary stroke prevention with patient and family      NSTEMI (non-ST elevated myocardial infarction)    Trop >50  NSTEMI  -Cardiology assisting      Closed fracture of neck of right femur    Recent surgery on 11/10/18     Essential hypertension    Stroke risk factor  SBP <180 post tPA  Management per neuro ICU  BP currently at goal          No notes on  file    STROKE DOCUMENTATION   Acute Stroke Times   Last Known Normal Date: 11/27/18  Last Known Normal Time: 1300  Symptom Onset Date: 11/27/18  Symptom Onset Time: 1330  Stroke Team Called Date: 11/27/18  Stroke Team Called Time: 1649  Stroke Team Arrival Date: 11/27/18  Stroke Team Arrival Time: 1650  CT Interpretation Time: 1718  Decision to Treat Time for Alteplase: (administed at OSH via telemedicine)  Decision to Treat Time for IR: 1732    NIH Scale:  1a. Level Of Consciousness: 2-->Not alert: requires repeated stimulation to attend, or is obtunded and requires strong or painful stimulation to make movements (not stereotyped)  1b. LOC Questions: 2-->Answers neither question correctly  1c. LOC Commands: 2-->Performs neither task correctly  2. Best Gaze: 1-->Partial gaze palsy: gaze is abnormal in one or both eyes, but forced deviation or total gaze paresis is not present  3. Visual: 0-->No visual loss  4. Facial Palsy: 2-->Partial paralysis (total or near-total paralysis of lower face)  5a. Motor Arm, Left: 0-->No drift: limb holds 90 (or 45) degrees for full 10 secs  5b. Motor Arm, Right: 1-->Drift: limb holds 90 (or 45) degrees, but drifts down before full 10 secs: does not hit bed or other support  6a. Motor Leg, Left: 3-->No effort against gravity: leg falls to bed immediately  6b. Motor Leg, Right: 3-->No effort against gravity: leg falls to bed immediately  7. Limb Ataxia: 2-->Present in two limbs  8. Sensory: 1-->Mild-to-moderate sensory loss: patient feels pinprick is less sharp or is dull on the affected side: or there is a loss of superficial pain with pinprick, but patient is aware of being touched  9. Best Language: 3-->Mute, global aphasia: no usable speech or auditory comprehension  10. Dysarthria: 2-->Severe dysarthria: patients speech is so slurred as to be unintelligible in the absence of or out of proportion to any dysphasia, or is mute/anarthric  11. Extinction and Inattention (formerly  Neglect): 0-->No abnormality  Total (NIH Stroke Scale): 24       Modified Dimmitt Score: 3  Mor Coma Scale:10   ABCD2 Score:    [id number] Score:   HAS -BLED Score:   ICH Score:   Hunt & Tapia Classification:      Hemorrhagic change of an Ischemic Stroke: Does this patient have an ischemic stroke with hemorrhagic changes? No     Neurologic Chief Complaint: L ICA terminus occlusion  RSW, RFD, L-gaze, Mute    Subjective:     Interval History: Patient is seen for follow-up neurological assessment and treatment recommendations: Continue heparin gtt for 48h and rate control with metoprolol. PT/OT/SLP. Switch to NOAC or warfarin.    HPI, Past Medical, Family, and Social History remains the same as documented in the initial encounter.     Review of Systems   Constitutional: Negative for chills and fever.   Respiratory: Negative for cough.    Cardiovascular: Negative for chest pain.   Gastrointestinal: Negative for nausea and vomiting.   Neurological: Positive for facial asymmetry, speech difficulty and weakness.     Scheduled Meds:   atorvastatin  40 mg Oral Daily    metoprolol tartrate  25 mg Per NG tube QID    senna-docusate 8.6-50 mg  2 tablet Per NG tube BID    sodium chloride 0.9%  3 mL Intravenous Q8H     Continuous Infusions:   heparin (porcine) in D5W 14 Units/kg/hr (11/30/18 0800)     PRN Meds:acetaminophen, calcium gluconate IVPB, calcium gluconate IVPB, calcium gluconate IVPB, magnesium sulfate IVPB, magnesium sulfate IVPB, ondansetron, sodium chloride 0.9%    Objective:     Vital Signs (Most Recent):  Temp: 98.1 °F (36.7 °C) (11/30/18 0701)  Pulse: 110 (11/30/18 0701)  Resp: (!) 37 (11/30/18 0600)  BP: (!) 141/96 (11/30/18 0701)  SpO2: 100 % (11/30/18 0701)  BP Location: Right arm    Vital Signs Range (Last 24H):  Temp:  [97.6 °F (36.4 °C)-98.3 °F (36.8 °C)]   Pulse:  []   Resp:  [18-37]   BP: (111-156)/()   SpO2:  [98 %-100 %]   BP Location: Right arm    Physical Exam   Constitutional: She  appears well-developed and well-nourished. No distress.   HENT:   Head: Normocephalic and atraumatic.   Eyes: Pupils are equal, round, and reactive to light.   Cardiovascular: Normal rate.   Irregularly irregular  Tachycardic   Pulmonary/Chest: Effort normal. No stridor. No respiratory distress. She has no wheezes.   Abdominal: She exhibits no mass. There is no guarding.   Neurological: She is alert.   Skin: Skin is warm and dry. Capillary refill takes less than 2 seconds.   Nursing note and vitals reviewed.      Neurological Exam:     LOC: alert  Attention Span: Good   Language: mute   Articulation: mute  Orientation: Not oriented to time  Visual Fields: left gaze  EOM (CN III, IV, VI): Full/intact  Pupils (CN II, III): PERRL  Facial Sensation (CN V): Normal  Facial Movement (CN VII): Symmetric facial expression    Motor:   Arm left  Normal 4/5  Leg left  Normal 2/5  Arm right  Normal 3/5  Leg right Normal 1/5  Sensation: unable  Tone: Normal tone throughout  Reflexes +2 throughout               Laboratory:    Recent Results (from the past 24 hour(s))   POCT glucose    Collection Time: 11/29/18 11:34 AM   Result Value Ref Range    POCT Glucose 119 (H) 70 - 110 mg/dL   POCT glucose    Collection Time: 11/30/18 12:25 AM   Result Value Ref Range    POCT Glucose 108 70 - 110 mg/dL   Comprehensive metabolic panel    Collection Time: 11/30/18  3:49 AM   Result Value Ref Range    Sodium 141 136 - 145 mmol/L    Potassium 3.7 3.5 - 5.1 mmol/L    Chloride 104 95 - 110 mmol/L    CO2 27 23 - 29 mmol/L    Glucose 120 (H) 70 - 110 mg/dL    BUN, Bld 32 (H) 8 - 23 mg/dL    Creatinine 0.7 0.5 - 1.4 mg/dL    Calcium 8.7 8.7 - 10.5 mg/dL    Total Protein 6.1 6.0 - 8.4 g/dL    Albumin 2.8 (L) 3.5 - 5.2 g/dL    Total Bilirubin 0.7 0.1 - 1.0 mg/dL    Alkaline Phosphatase 170 (H) 55 - 135 U/L    AST 61 (H) 10 - 40 U/L    ALT 26 10 - 44 U/L    Anion Gap 10 8 - 16 mmol/L    eGFR if African American >60.0 >60 mL/min/1.73 m^2    eGFR if non  African American >60.0 >60 mL/min/1.73 m^2   CBC auto differential    Collection Time: 11/30/18  3:49 AM   Result Value Ref Range    WBC 17.82 (H) 3.90 - 12.70 K/uL    RBC 3.71 (L) 4.00 - 5.40 M/uL    Hemoglobin 10.7 (L) 12.0 - 16.0 g/dL    Hematocrit 34.5 (L) 37.0 - 48.5 %    MCV 93 82 - 98 fL    MCH 28.8 27.0 - 31.0 pg    MCHC 31.0 (L) 32.0 - 36.0 g/dL    RDW 13.3 11.5 - 14.5 %    Platelets 502 (H) 150 - 350 K/uL    MPV 9.7 9.2 - 12.9 fL    Immature Granulocytes 0.8 (H) 0.0 - 0.5 %    Gran # (ANC) 14.7 (H) 1.8 - 7.7 K/uL    Immature Grans (Abs) 0.14 (H) 0.00 - 0.04 K/uL    Lymph # 1.5 1.0 - 4.8 K/uL    Mono # 1.5 (H) 0.3 - 1.0 K/uL    Eos # 0.0 0.0 - 0.5 K/uL    Baso # 0.03 0.00 - 0.20 K/uL    nRBC 0 0 /100 WBC    Gran% 82.6 (H) 38.0 - 73.0 %    Lymph% 8.2 (L) 18.0 - 48.0 %    Mono% 8.2 4.0 - 15.0 %    Eosinophil% 0.0 0.0 - 8.0 %    Basophil% 0.2 0.0 - 1.9 %    Differential Method Automated    Magnesium    Collection Time: 11/30/18  3:49 AM   Result Value Ref Range    Magnesium 2.4 1.6 - 2.6 mg/dL   Phosphorus    Collection Time: 11/30/18  3:49 AM   Result Value Ref Range    Phosphorus 4.1 2.7 - 4.5 mg/dL   Protime-INR    Collection Time: 11/30/18  3:49 AM   Result Value Ref Range    Prothrombin Time 11.6 9.0 - 12.5 sec    INR 1.1 0.8 - 1.2   APTT    Collection Time: 11/30/18  3:49 AM   Result Value Ref Range    aPTT 47.5 (H) 21.0 - 32.0 sec   POCT glucose    Collection Time: 11/30/18  6:08 AM   Result Value Ref Range    POCT Glucose 109 70 - 110 mg/dL         Diagnostic Results       Brain imaging:  CT Head. Date: 11/30/18 @0300  Continued temporal evolution of left MCA territory distribution acute infarct.  No evidence of midline shift or new large parenchymal hemorrhage.      MRI brain 11/28/18:  -Moderate size regions of signal abnormality left MCA territory compatible with recent infarction.   -Please note small component of hemorrhage within the left mesial temporal component of infarction.  -In addition  there is superimposed small region of recent infarction in the right inferior frontal lobe.  -Generalized cerebral volume loss with scattered T2 FLAIR signal hyperintensity supratentorial white matter and lilly concerning for underlying chronic ischemic change.  -No evidence for hydrocephalus with left middle cranial fossa probable rack node cyst      CT Head. Date: 11/27/18  No CT evidence of acute intracranial abnormality.    Vessel Imaging:  CTA Multiphase. Date: 11/27/18  Partial opacification of the left cervical ICA, without meaningful opacification of the ICA distally or intracranially, findings suggests thrombus, either within the vessel itself, or the carotid terminus.  There is some flow within the distal M2 and M3 branches on the left, with improved flow on phase 2 and face 3 however no meaningful flow is identified within the M1 segment on the left.  These findings likely correlate with vague hypoattenuation seen within the region of the left basal ganglia and partially left caudate/subinsular region.    No additional regions of high-grade stenosis or occlusion, please see above for full details.       Cardiac Evaluation:   TTE 11/28/18:  · Severe left atrial enlargement.  · Mildly decreased left ventricular systolic function. The estimated ejection fraction is 45%  · Septal wall has abnormal motion.  · Left ventricular diastolic dysfunction.  · Moderate-to-severe mitral regurgitation.  · Moderate to severe tricuspid regurgitation.  · The estimated PA systolic pressure is 45.77 mm Hg  · Normal central venous pressure (3 mm Hg).        Hansel Donaldson MD  Comprehensive Stroke Center  Department of Vascular Neurology   [facility]

## 2018-11-30 NOTE — PROGRESS NOTES
[facility]  Cardiology  Progress Note    Patient Name: Apple Watson  MRN: [id number]  Admission Date: 11/27/2018  Hospital Length of Stay: 3 days  Code Status: DNR   Attending Physician: Mc Carrasquillo MD   Primary Care Physician: Tree Rolon MD  Expected Discharge Date:   Principal Problem:Embolic stroke involving left carotid artery    Subjective:     Hospital Course:   No notes on file    Interval History: patient remains aphasic.    Review of Systems   Unable to perform ROS: patient nonverbal     Objective:     Vital Signs (Most Recent):  Temp: 98.1 °F (36.7 °C) (11/30/18 0701)  Pulse: 104 (11/30/18 1150)  Resp: (!) 36 (11/30/18 1150)  BP: (!) 135/97 (11/30/18 1000)  SpO2: 100 % (11/30/18 1150) Vital Signs (24h Range):  Temp:  [97.6 °F (36.4 °C)-98.3 °F (36.8 °C)] 98.1 °F (36.7 °C)  Pulse:  [] 104  Resp:  [18-37] 36  SpO2:  [98 %-100 %] 100 %  BP: (111-156)/() 135/97     Weight: 63.5 kg (139 lb 15.9 oz)  Body mass index is 28.6 kg/m².     SpO2: 100 %  O2 Device (Oxygen Therapy): nasal cannula      Intake/Output Summary (Last 24 hours) at 11/30/2018 1213  Last data filed at 11/30/2018 1100  Gross per 24 hour   Intake 640.8 ml   Output --   Net 640.8 ml       Lines/Drains/Airways     Drain                 NG/OG Tube 11/28/18 1200 Right nostril 2 days    Female External Urinary Catheter 11/27/18 2100 2 days         Rectal Tube 11/30/18 0614 fecal management system less than 1 day          Peripheral Intravenous Line                 Peripheral IV - Single Lumen 11/27/18 1509 Left Forearm 2 days         Peripheral IV - Single Lumen 11/29/18 0243 Left;Posterior Hand 1 day                Physical Exam   Constitutional: Vital signs are normal. She appears well-developed and well-nourished. She is cooperative.  Non-toxic appearance. No distress.   HENT:   Head: Normocephalic and atraumatic.   Neck: No hepatojugular reflux and no JVD present. Carotid bruit is not present.    Cardiovascular: S1 normal and S2 normal. An irregularly irregular rhythm present. Tachycardia present. Exam reveals no gallop.   Murmur heard.  High-pitched blowing holosystolic murmur is present with a grade of 3/6 at the apex.  Pulses:       Dorsalis pedis pulses are 2+ on the right side, and 2+ on the left side.        Posterior tibial pulses are 2+ on the right side, and 2+ on the left side.   No lower extremity edema   Pulmonary/Chest: Effort normal and breath sounds normal. No respiratory distress. She has no decreased breath sounds. She has no wheezes. She has no rhonchi. She has no rales.   Abdominal: Soft. Normal appearance and bowel sounds are normal. She exhibits no distension and no mass. There is no tenderness.   Neurological: She is alert.   Skin: Skin is warm, dry and intact.       Significant Labs:   CMP   Recent Labs   Lab 11/29/18  0110 11/30/18  0349    141   K 4.2 3.7    104   CO2 26 27   * 120*   BUN 27* 32*   CREATININE 0.7 0.7   CALCIUM 8.4* 8.7   PROT 5.7* 6.1   ALBUMIN 2.7* 2.8*   BILITOT 0.5 0.7   ALKPHOS 152* 170*   * 61*   ALT 34 26   ANIONGAP 8 10   ESTGFRAFRICA >60.0 >60.0   EGFRNONAA >60.0 >60.0    and CBC   Recent Labs   Lab 11/28/18  1903 11/29/18  0110 11/30/18  0349   WBC 13.34* 14.21*  14.21* 17.82*   HGB 9.8* 9.9*  9.9* 10.7*   HCT 31.8* 32.0*  32.0* 34.5*   * 432*  432* 502*       Significant Imaging:     EKG (11/28/2018): Atrial Fibrillation with . Incomplete LBBB with a previous anteroseptal infarct    2D Echo (11/28/2018):  Left Ventricle Mild decreased ejection fraction at 45%. Cavity is normal. Normal wall thickness observed. Abnormal septal motion. Left ventricular diastolic dysfunction.   Right Ventricle Normal cavity size and wall thickness. Wall motion normal.   Left Atrium Severe atrial enlargement.   Right Atrium Mild atrial enlargement.   Aortic Valve The valve is trileaflet. Aortic valve sclerosis is mild. Mobility is  normal   Mitral Valve Normal valve structure. Moderate to severe regurgitation. The jet is posterior directed and is eccentric. Normal leaflet mobility.   Tricuspid Valve The tricuspid valve is normal. Moderate to severe regurgitation. Pulmonary hypertension present. Mobility is normalThe estimated PA systolic pressure is 45.77 mmHg   Pulmonic Valve Normal valve structure. Mobility is normal.   IVC/SVC Normal central venous pressure (3 mm Hg).   Ascending Aorta Normal aortic root, size and contour.   Pericardium No pericardial effusion.         Assessment and Plan:       Atrial fibrillation    - CHADSVASC 6 -  Continue anticoagulation and convert to OAC when able  - Currently on lopressor 25 QID. Titrate up gradually until HR <110     Elevated troponin          NSTEMI (non-ST elevated myocardial infarction)    Elevated troponin likely to be sequelae of acute coronary syndrome (unclear if the result of embolic phenomena from atrial thrombus in the setting of her acute CVA and active atrial fib) with markedly elevated tropnins, transient EKG with brief ST elevations, and unable to communicate chest pain, in the setting of possible supply/demand ischemia 2 weeks ago.      - Patient would benefit from therapy for ACS with ASA, Plavix 75mg qday and Heparin gtt for 48hrs, when cleared by neuro-teams  - today will be the first day of lopressor 25 QID dosing. Titrate up gradually until HR goal achieved  - continue anticoagulation as outpatient for high risk AF (POTMY8J = 5) with high likelihood of atrial thrombus  - based off of echo, likely had embolus of clot to distal LAD and the stroke at the same time.  - would not recommend ischemic workup in the acute setting. Follow up with cardiology in the outpatient setting and determine need for evaluation at that time.         VTE Risk Mitigation (From admission, onward)        Ordered     heparin 25,000 units in dextrose 5% 250 ml (100 units/mL) infusion MINIMAL INTENSITY  nomogram - [facility]  Continuous      11/28/18 1857     Reason for No Pharmacological VTE Prophylaxis  Once      11/27/18 1743     IP VTE HIGH RISK PATIENT  Once      11/27/18 1743     Place sequential compression device  Until discontinued      11/27/18 1743        Thank you for the consult. We will sign off at this time. Please do not hesitate to call if you have any further questions or issues.    Laura Moore MD  Cardiology  [facility]

## 2018-11-30 NOTE — PLAN OF CARE
Problem: SLP Goal  Goal: SLP Goal  Speech Language Pathology Goals  Goals expected to be met by 12/5  1. Ongoing swallow assessment.   2. Pt will elicit verbalization x3 to automatic speech tasks.   3. Pt will follow simple commands x3.  4. Pt will respond via any modality to simple y/n questions.   5. Ongoing cognitive /linguistic assessment as appropriate.       Goals remain appropriate.   Emily P. Abadie M.S., CCC-SLP  Speech Language Pathologist  [phone]  11/30/2018

## 2018-11-30 NOTE — PT/OT/SLP PROGRESS
Occupational Therapy   Treatment    Name: Apple Watson  MRN: [id number]  Admitting Diagnosis:  Embolic stroke involving left carotid artery       Recommendations:     Discharge Recommendations: nursing facility, skilled  Discharge Equipment Recommendations:  (TBD)  Barriers to discharge:  None    Subjective   Pt's family reported that pt had had a bad night (multiple BM's) and was very tired today.  Pain/Comfort:  · Pain Rating 1: 0/10  · Pain Rating Post-Intervention 1: 0/10(no indication of pain during session)    Objective:     Communicated with: RN prior to session.  Patient found supine in bed with 4 family members present in room and blood pressure cuff, pulse ox (continuous), telemetry, peripheral IV, NG tube upon OT entry to room.    General Precautions: Standard, aspiration, fall, NPO   Orthopedic Precautions:RLE weight bearing as tolerated     Occupational Performance:    [facility] 6 Click ADL: 6    Treatment & Education:  No EOB performed on this date due to discussion between RN & OT (regarding pt status) & family members concerns regarding pt fatigue.  Provided AAROM with LUE & PROM with RUE in all available planes (no elbow flexion for LUE due to noted wetness around IV site in that region - RN notified) x 10 reps each while supine.  Pt with eyes open majority of session.  Pt nodded head yes/no to questions during session.  Provided education to pt's family regarding POC with family verbalizing understanding.      Patient left supine with all lines intact, call button in reach, RN notified, family present and white board updated.  Education:    Assessment:     Apple Watson is a 89 y.o. female with a medical diagnosis of Embolic stroke involving left carotid artery.  She presents with the following performance deficits affecting function are weakness, impaired endurance, impaired sensation, impaired self care skills, impaired functional mobilty, impaired balance, decreased coordination, impaired  cognition, decreased upper extremity function, decreased lower extremity function, decreased safety awareness, abnormal tone, decreased ROM, impaired fine motor, impaired coordination, impaired cardiopulmonary response to activity, impaired skin. Limited session on this date due to pt status & fatigue.    Rehab Prognosis:  Limited currently; patient would benefit from acute skilled OT services to address these deficits and reach maximum level of function.       Plan:     Patient to be seen 4 x/week to address the above listed problems via self-care/home management, therapeutic activities, neuromuscular re-education, therapeutic exercises, cognitive retraining, sensory integration  · Plan of Care Expires: 12/28/18  · Plan of Care Reviewed with: patient, family    This Plan of care has been discussed with the patient who was involved in its development and understands and is in agreement with the identified goals and treatment plan    GOALS:   Multidisciplinary Problems     Occupational Therapy Goals        Problem: Occupational Therapy Goal    Goal Priority Disciplines Outcome Interventions   Occupational Therapy Goal     OT, PT/OT Ongoing (interventions implemented as appropriate)    Description:  Goals to be met by: 12/7     Patient will increase functional independence with ADLs by performing:    UE Dressing with Maximum Assistance.  LE Dressing with Maximum Assistance.  Grooming while seated with Moderate Assistance.  Toileting from bedside commode with Maximum Assistance for hygiene and clothing management.   Sitting at edge of bed x15 minutes with Moderate Assistance.  Rolling to Bilateral with Moderate Assistance.   Supine to sit with Moderate Assistance.  Stand pivot transfers with Maximum Assistance.  Pt will follow 3/4 one step commands.  Pt will maintain eyes open 75% of session.                      Time Tracking:     OT Date of Treatment: 11/30/18  OT Start Time: 1042  OT Stop Time: 1055  OT Total Time  (min): 13 min    Billable Minutes:Therapeutic Exercise 13    RACHAEL Otoole  11/30/2018

## 2018-11-30 NOTE — ASSESSMENT & PLAN NOTE
Elevated troponin likely to be sequelae of acute coronary syndrome (unclear if the result of embolic phenomena from atrial thrombus in the setting of her acute CVA and active atrial fib) with markedly elevated tropnins, transient EKG with brief ST elevations, and unable to communicate chest pain, in the setting of possible supply/demand ischemia 2 weeks ago.      - Patient would benefit from therapy for ACS with ASA, Plavix 75mg qday and Heparin gtt for 48hrs, when cleared by neuro-teams  - today will be the first day of lopressor 25 QID dosing. Titrate up gradually until HR goal achieved  - continue anticoagulation as outpatient for high risk AF (SVUNT3G = 5) with high likelihood of atrial thrombus  - based off of echo, likely had embolus of clot to distal LAD and the stroke at the same time.  - would not recommend ischemic workup in the acute setting. Follow up with cardiology in the outpatient setting and determine need for evaluation at that time.

## 2018-11-30 NOTE — SUBJECTIVE & OBJECTIVE
Interval History: patient remains aphasic.    Review of Systems   Unable to perform ROS: patient nonverbal     Objective:     Vital Signs (Most Recent):  Temp: 98.1 °F (36.7 °C) (11/30/18 0701)  Pulse: 104 (11/30/18 1150)  Resp: (!) 36 (11/30/18 1150)  BP: (!) 135/97 (11/30/18 1000)  SpO2: 100 % (11/30/18 1150) Vital Signs (24h Range):  Temp:  [97.6 °F (36.4 °C)-98.3 °F (36.8 °C)] 98.1 °F (36.7 °C)  Pulse:  [] 104  Resp:  [18-37] 36  SpO2:  [98 %-100 %] 100 %  BP: (111-156)/() 135/97     Weight: 63.5 kg (139 lb 15.9 oz)  Body mass index is 28.6 kg/m².     SpO2: 100 %  O2 Device (Oxygen Therapy): nasal cannula      Intake/Output Summary (Last 24 hours) at 11/30/2018 1213  Last data filed at 11/30/2018 1100  Gross per 24 hour   Intake 640.8 ml   Output --   Net 640.8 ml       Lines/Drains/Airways     Drain                 NG/OG Tube 11/28/18 1200 Right nostril 2 days    Female External Urinary Catheter 11/27/18 2100 2 days         Rectal Tube 11/30/18 0614 fecal management system less than 1 day          Peripheral Intravenous Line                 Peripheral IV - Single Lumen 11/27/18 1509 Left Forearm 2 days         Peripheral IV - Single Lumen 11/29/18 0243 Left;Posterior Hand 1 day                Physical Exam   Constitutional: Vital signs are normal. She appears well-developed and well-nourished. She is cooperative.  Non-toxic appearance. No distress.   HENT:   Head: Normocephalic and atraumatic.   Neck: No hepatojugular reflux and no JVD present. Carotid bruit is not present.   Cardiovascular: S1 normal and S2 normal. An irregularly irregular rhythm present. Tachycardia present. Exam reveals no gallop.   Murmur heard.  High-pitched blowing holosystolic murmur is present with a grade of 3/6 at the apex.  Pulses:       Dorsalis pedis pulses are 2+ on the right side, and 2+ on the left side.        Posterior tibial pulses are 2+ on the right side, and 2+ on the left side.   No lower extremity edema    Pulmonary/Chest: Effort normal and breath sounds normal. No respiratory distress. She has no decreased breath sounds. She has no wheezes. She has no rhonchi. She has no rales.   Abdominal: Soft. Normal appearance and bowel sounds are normal. She exhibits no distension and no mass. There is no tenderness.   Neurological: She is alert.   Skin: Skin is warm, dry and intact.       Significant Labs:   CMP   Recent Labs   Lab 11/29/18  0110 11/30/18  0349    141   K 4.2 3.7    104   CO2 26 27   * 120*   BUN 27* 32*   CREATININE 0.7 0.7   CALCIUM 8.4* 8.7   PROT 5.7* 6.1   ALBUMIN 2.7* 2.8*   BILITOT 0.5 0.7   ALKPHOS 152* 170*   * 61*   ALT 34 26   ANIONGAP 8 10   ESTGFRAFRICA >60.0 >60.0   EGFRNONAA >60.0 >60.0    and CBC   Recent Labs   Lab 11/28/18  1903 11/29/18  0110 11/30/18  0349   WBC 13.34* 14.21*  14.21* 17.82*   HGB 9.8* 9.9*  9.9* 10.7*   HCT 31.8* 32.0*  32.0* 34.5*   * 432*  432* 502*       Significant Imaging:     EKG (11/28/2018): Atrial Fibrillation with . Incomplete LBBB with a previous anteroseptal infarct    2D Echo (11/28/2018):  Left Ventricle Mild decreased ejection fraction at 45%. Cavity is normal. Normal wall thickness observed. Abnormal septal motion. Left ventricular diastolic dysfunction.   Right Ventricle Normal cavity size and wall thickness. Wall motion normal.   Left Atrium Severe atrial enlargement.   Right Atrium Mild atrial enlargement.   Aortic Valve The valve is trileaflet. Aortic valve sclerosis is mild. Mobility is normal   Mitral Valve Normal valve structure. Moderate to severe regurgitation. The jet is posterior directed and is eccentric. Normal leaflet mobility.   Tricuspid Valve The tricuspid valve is normal. Moderate to severe regurgitation. Pulmonary hypertension present. Mobility is normalThe estimated PA systolic pressure is 45.77 mmHg   Pulmonic Valve Normal valve structure. Mobility is normal.   IVC/SVC Normal central venous  pressure (3 mm Hg).   Ascending Aorta Normal aortic root, size and contour.   Pericardium No pericardial effusion.

## 2018-12-01 LAB
ALBUMIN SERPL BCP-MCNC: 2.6 G/DL
ALLENS TEST: ABNORMAL
ALP SERPL-CCNC: 175 U/L
ALT SERPL W/O P-5'-P-CCNC: 20 U/L
ANION GAP SERPL CALC-SCNC: 11 MMOL/L
APTT BLDCRRT: 51.5 SEC
AST SERPL-CCNC: 38 U/L
BASOPHILS # BLD AUTO: 0.03 K/UL
BASOPHILS NFR BLD: 0.2 %
BILIRUB SERPL-MCNC: 0.4 MG/DL
BUN SERPL-MCNC: 40 MG/DL
CALCIUM SERPL-MCNC: 8.5 MG/DL
CHLORIDE SERPL-SCNC: 108 MMOL/L
CO2 SERPL-SCNC: 24 MMOL/L
CREAT SERPL-MCNC: 0.8 MG/DL
DIFFERENTIAL METHOD: ABNORMAL
EOSINOPHIL # BLD AUTO: 0 K/UL
EOSINOPHIL NFR BLD: 0.1 %
ERYTHROCYTE [DISTWIDTH] IN BLOOD BY AUTOMATED COUNT: 13.4 %
EST. GFR  (AFRICAN AMERICAN): >60 ML/MIN/1.73 M^2
EST. GFR  (NON AFRICAN AMERICAN): >60 ML/MIN/1.73 M^2
GLUCOSE SERPL-MCNC: 151 MG/DL
HCO3 UR-SCNC: 25.4 MMOL/L (ref 24–28)
HCT VFR BLD AUTO: 34.1 %
HGB BLD-MCNC: 10.8 G/DL
IMM GRANULOCYTES # BLD AUTO: 0.15 K/UL
IMM GRANULOCYTES NFR BLD AUTO: 0.9 %
INR PPP: 1.2
LYMPHOCYTES # BLD AUTO: 1.5 K/UL
LYMPHOCYTES NFR BLD: 9.2 %
MAGNESIUM SERPL-MCNC: 2.3 MG/DL
MCH RBC QN AUTO: 29.9 PG
MCHC RBC AUTO-ENTMCNC: 31.7 G/DL
MCV RBC AUTO: 95 FL
MONOCYTES # BLD AUTO: 1 K/UL
MONOCYTES NFR BLD: 6 %
NEUTROPHILS # BLD AUTO: 13.9 K/UL
NEUTROPHILS NFR BLD: 83.6 %
NRBC BLD-RTO: 0 /100 WBC
PCO2 BLDA: 32 MMHG (ref 35–45)
PH SMN: 7.51 [PH] (ref 7.35–7.45)
PHOSPHATE SERPL-MCNC: 3.1 MG/DL
PLATELET # BLD AUTO: 522 K/UL
PMV BLD AUTO: 9.9 FL
PO2 BLDA: 163 MMHG (ref 80–100)
POC BE: 2 MMOL/L
POC SATURATED O2: 100 % (ref 95–100)
POC TCO2: 26 MMOL/L (ref 23–27)
POCT GLUCOSE: 121 MG/DL (ref 70–110)
POCT GLUCOSE: 136 MG/DL (ref 70–110)
POCT GLUCOSE: 145 MG/DL (ref 70–110)
POCT GLUCOSE: 152 MG/DL (ref 70–110)
POTASSIUM SERPL-SCNC: 3.8 MMOL/L
PROT SERPL-MCNC: 5.9 G/DL
PROTHROMBIN TIME: 12.3 SEC
RBC # BLD AUTO: 3.61 M/UL
SAMPLE: ABNORMAL
SITE: ABNORMAL
SODIUM SERPL-SCNC: 143 MMOL/L
WBC # BLD AUTO: 16.65 K/UL

## 2018-12-01 PROCEDURE — 99233 SBSQ HOSP IP/OBS HIGH 50: CPT | Mod: ,,, | Performed by: PSYCHIATRY & NEUROLOGY

## 2018-12-01 PROCEDURE — 97110 THERAPEUTIC EXERCISES: CPT

## 2018-12-01 PROCEDURE — A4216 STERILE WATER/SALINE, 10 ML: HCPCS | Performed by: NURSE PRACTITIONER

## 2018-12-01 PROCEDURE — 25000242 PHARM REV CODE 250 ALT 637 W/ HCPCS: Performed by: NURSE PRACTITIONER

## 2018-12-01 PROCEDURE — 27000221 HC OXYGEN, UP TO 24 HOURS

## 2018-12-01 PROCEDURE — 25000003 PHARM REV CODE 250: Performed by: NURSE PRACTITIONER

## 2018-12-01 PROCEDURE — 36600 WITHDRAWAL OF ARTERIAL BLOOD: CPT

## 2018-12-01 PROCEDURE — 83735 ASSAY OF MAGNESIUM: CPT

## 2018-12-01 PROCEDURE — 85730 THROMBOPLASTIN TIME PARTIAL: CPT

## 2018-12-01 PROCEDURE — G8978 MOBILITY CURRENT STATUS: HCPCS | Mod: CN

## 2018-12-01 PROCEDURE — 99232 SBSQ HOSP IP/OBS MODERATE 35: CPT | Mod: ,,, | Performed by: NURSE PRACTITIONER

## 2018-12-01 PROCEDURE — 85610 PROTHROMBIN TIME: CPT

## 2018-12-01 PROCEDURE — 80053 COMPREHEN METABOLIC PANEL: CPT

## 2018-12-01 PROCEDURE — G8980 MOBILITY D/C STATUS: HCPCS | Mod: CN

## 2018-12-01 PROCEDURE — 94761 N-INVAS EAR/PLS OXIMETRY MLT: CPT

## 2018-12-01 PROCEDURE — 25000003 PHARM REV CODE 250: Performed by: PHYSICIAN ASSISTANT

## 2018-12-01 PROCEDURE — 99900035 HC TECH TIME PER 15 MIN (STAT)

## 2018-12-01 PROCEDURE — 97530 THERAPEUTIC ACTIVITIES: CPT

## 2018-12-01 PROCEDURE — 82803 BLOOD GASES ANY COMBINATION: CPT

## 2018-12-01 PROCEDURE — 94640 AIRWAY INHALATION TREATMENT: CPT

## 2018-12-01 PROCEDURE — 84100 ASSAY OF PHOSPHORUS: CPT

## 2018-12-01 PROCEDURE — 63600175 PHARM REV CODE 636 W HCPCS: Performed by: NURSE PRACTITIONER

## 2018-12-01 PROCEDURE — 85025 COMPLETE CBC W/AUTO DIFF WBC: CPT

## 2018-12-01 PROCEDURE — 20600001 HC STEP DOWN PRIVATE ROOM

## 2018-12-01 PROCEDURE — G8979 MOBILITY GOAL STATUS: HCPCS | Mod: CN

## 2018-12-01 RX ADMIN — HEPARIN SODIUM AND DEXTROSE 14 UNITS/KG/HR: 10000; 5 INJECTION INTRAVENOUS at 11:12

## 2018-12-01 RX ADMIN — Medication 3 ML: at 09:12

## 2018-12-01 RX ADMIN — POTASSIUM CHLORIDE 40 MEQ: 20 SOLUTION ORAL at 03:12

## 2018-12-01 RX ADMIN — METOPROLOL TARTRATE 50 MG: 50 TABLET ORAL at 08:12

## 2018-12-01 RX ADMIN — Medication 3 ML: at 02:12

## 2018-12-01 RX ADMIN — METOPROLOL TARTRATE 50 MG: 50 TABLET ORAL at 01:12

## 2018-12-01 RX ADMIN — HEPARIN SODIUM AND DEXTROSE 14 UNITS/KG/HR: 10000; 5 INJECTION INTRAVENOUS at 07:12

## 2018-12-01 RX ADMIN — METOPROLOL TARTRATE 50 MG: 50 TABLET ORAL at 02:12

## 2018-12-01 RX ADMIN — IPRATROPIUM BROMIDE AND ALBUTEROL SULFATE 3 ML: .5; 3 SOLUTION RESPIRATORY (INHALATION) at 12:12

## 2018-12-01 RX ADMIN — ATORVASTATIN CALCIUM 40 MG: 20 TABLET, FILM COATED ORAL at 08:12

## 2018-12-01 RX ADMIN — IPRATROPIUM BROMIDE AND ALBUTEROL SULFATE 3 ML: .5; 3 SOLUTION RESPIRATORY (INHALATION) at 02:12

## 2018-12-01 RX ADMIN — IPRATROPIUM BROMIDE AND ALBUTEROL SULFATE 3 ML: .5; 3 SOLUTION RESPIRATORY (INHALATION) at 07:12

## 2018-12-01 RX ADMIN — IPRATROPIUM BROMIDE AND ALBUTEROL SULFATE 3 ML: .5; 3 SOLUTION RESPIRATORY (INHALATION) at 06:12

## 2018-12-01 NOTE — PROGRESS NOTES
NCC notified of pupillary change. Pupils now a size 6; R sluggish and L brisk. Previously size 3 and both brisk. Pt still moving all 4 extremities spontaneously-not to commands. Stat CT ordered by AMALIA Reveles.     Pt transferred to ct with 2 RN, portable monitor, ambu bag, O2. Pt tolerated well; VSS. Pt returned to rm 9086 @ 0525. Rn will continue to monitor.

## 2018-12-01 NOTE — PT/OT/SLP PROGRESS
"     Physical Therapy Treatment    Patient Name: Apple Watson  MRN: [id number]   Diagnosis: Embolic stroke involving left carotid artery    Recommendations:     Discharge Recommendations:  nursing facility, skilled   Discharge Equipment Recommendations: (TBD)   Barriers to Discharge: medical acuity, significant change in mobility, increased caregiver burden    Plan:   During this hospitalization, the patient will be seen 5 x/week for gait training, therapeutic activities, therapeutic exercises, neuromuscular re-education, wheelchair management/training to address impairments and functional mobility deficits.   · Plan of Care Expires: 12/29/18   Plan of Care Reviewed with: patient, son    This plan of care has been discussed with the patient and/or family who were involved in its development and are in agreement with the identified goals and treatment plan.     Subjective   PT communicated with Rn prior to therapy.     Patient comments/goals: "if you don't think it will hurt her you can work with her."  Pain/Comfort:  · Pain Rating 1: 0/10  · Pain Rating Post-Intervention 1: 0/10    Recent Vital Signs: (Last documentation)  Temp: 98.9 °F (37.2 °C) (12/01/18 1100)  Pulse: 108 (12/01/18 1300)  Resp: (!) 39 (12/01/18 1300)  BP: (!) 145/82 (12/01/18 1300)  SpO2: 100 % (12/01/18 1300)     Objective:   General Precautions: aphasia, aspiration, fall(R posterior hip precautions)  Recent Surgery: * No surgery found *    The patient currently has blood pressure cuff, pulse ox (continuous), telemetry, bowel management system, granda catheter, NG tube, restraints, pressure relief boots, SCD.    The patient was found supine in bed in ICU with her son at bedside. He was reluctant to allow therapy to work with his mother, but agreed when PT explained role of therapy.  Patient continues to have a-fib on ICU monitor with HR fluctuating from 100-145 (most frequently 120s) while in supine at rest (prior to PT intervention).  Supine LE " exercises were performed. PROM RLE 15x/ea and AAROM LLE 15x/ea: heel slides, hip ABD/ADD, ankle pumps, SAQ.  She was lethargic and not consistently participating in LE exercises.  0/5 strength on RLE (different from initial eval) but may also be due to lethargy. She did not follow commands.  Moving LUE and LLE against gravity.      PT educated patient's son on role of PT, POC, and benefits of mobilization. PT discussed hip precautions and posted sign in room.  PT answered all the son's questions to his satisfaction and within scope of practice.     The patient was left in L sidelying on pillow with all lines intact, call bell in reach, VSS, and RN notified.    FUNCTIONAL OUTCOME MEASURES:  Turning over in bed (including adjusting bedclothes, sheets and blankets)?: 1  Sitting down on and standing up from a chair with arms (e.g., wheelchair, bedside commode, etc.): 1  Moving from lying on back to sitting on the side of the bed?: 1  Moving to and from a bed to a chair (including a wheelchair)?: 1  Need to walk in hospital room?: 1  Climbing 3-5 steps with a railing?: 1  Basic Mobility Total Score: 6    Goals:     Multidisciplinary Problems     Physical Therapy Goals        Problem: Physical Therapy Goal    Goal Priority Disciplines Outcome Goal Variances Interventions   Physical Therapy Goal     PT, PT/OT Ongoing (interventions implemented as appropriate)     Description:    Goals to be met by 12/9/2018    1. Pt will perform rolling to the R and L with moderate assistance.   2. Pt will perform supine to sit from both sides of the bed with moderate assistance.  3. Pt will perform sit to supine with moderate assistance.  4. Pt will perform sit to stand transfers with max assistance.    5. Pt will perform bed <> chair transfers with max assistance.  6. Pt will perform gait x 10 feet with max assistance using RW.  7. Pt will sit EOB x 5 minutes with min assistance and no LOB to prepare for functional tasks in sitting.                    Assessment:   Apple Watson is a 89 y.o. female admitted with a medical diagnosis of Embolic stroke involving left carotid artery.  She presented with decreased active movement in RLE compared to initial evaluation.  However she was also more lethargic than on the initial eval and lethargy expected to be contributing to poorer presentation.  Her son reported she did not sleep well last night.  EOB not performed d/t tachycardia in supine while at rest.  Will continue to progress mobility conservatively as tolerated.        Problems/Impairments:  weakness, decreased upper extremity function, decreased lower extremity function, impaired endurance, impaired cardiopulmonary response to activity, decreased safety awareness, impaired sensation, impaired self care skills, impaired functional mobilty, impaired cognition, abnormal tone, impaired skin, impaired fine motor, visual deficits     Rehab Prognosis:  limited to fair. The patient would benefit from acute skilled PT services to address these deficits and maximize their functional independence.     Time Tracking:     PT Received On:  12/01/18  PT Start Time:   1305    PT Stop Time:  1328  PT Total Time (min): 23 min     Billable Minutes: Therapeutic Activity 8 and Therapeutic Exercise 15     Heatehr Chino, PT  12/1/2018  [phone] (pager)

## 2018-12-01 NOTE — ASSESSMENT & PLAN NOTE
-- Continue Neuro checks q 1hr  -- Vascular Neurology consulted  -- CTA multiphase pending  -- SBP goal <140  -- PT/OT/Speech  -- CXR  -- Electrolyte replace prn  -- Tici 2C reperfusion   -- 11/28 Mri stable  -- 11/29 F/u CTH in am  -- 11/29 F/u CxR  -- 11/30 DNR

## 2018-12-01 NOTE — PLAN OF CARE
Problem: Patient Care Overview  Goal: Plan of Care Review  Outcome: Ongoing (interventions implemented as appropriate)  POC reviewed with pt and daughter at 0500. Pt unable to verbalize understanding. Questions and concerns addressed with daughter. Heparin gtt continued at 14 units/kg/hr. Urine O/P in granda remains 10-30. Pt transferred for stat CTH d/t pupillary changes; no other neuro changes. VSS. TF continued @ goal of 40 cc/hr. Pt progressing toward goals. Will continue to monitor. See flowsheets for full assessment and VS info

## 2018-12-01 NOTE — SUBJECTIVE & OBJECTIVE
Past Medical History:   Diagnosis Date    Atrial fibrillation with RVR 11/28/2018    Depression     Hypertension     Neuropathy      Past Surgical History:   Procedure Laterality Date    APPENDECTOMY      ARTHROPLASTY-HIP-NOLBERTO Right 11/10/2018    Performed by Nasir Danielle MD at [facility] OR    GALLBLADDER SURGERY      HEMIARTHROPLASTY OF HIP Right 11/10/2018    Procedure: ARTHROPLASTY-HIP-NOLBERTO;  Surgeon: Nasir Danielle MD;  Location: [facility] OR;  Service: Orthopedics;  Laterality: Right;  Depuy Bipolar  LAteral positioner with positioners    HYSTERECTOMY      TONSILLECTOMY        No current facility-administered medications on file prior to encounter.      Current Outpatient Medications on File Prior to Encounter   Medication Sig Dispense Refill    amitriptyline (ELAVIL) 10 MG tablet Take 1 tablet (10 mg total) by mouth 2 (two) times daily. 180 tablet 3    calcium carb/vit D3/minerals (CALCIUM CARBONATE-VIT D3-MIN) 600 mg (1,500 mg)-400 unit Chew Take 1 tablet by mouth once daily. 30 each 3    cyanocobalamin (VITAMIN B-12) 250 MCG tablet Take 250 mcg by mouth once daily.      ergocalciferol (VITAMIN D2) 50,000 unit Cap Take 50,000 Units by mouth every 7 days.      gabapentin (NEURONTIN) 300 MG capsule Take 1 capsule (300 mg total) by mouth every evening. 30 capsule 11    guaifenesin-codeine 100-10 mg/5 ml (TUSSI-ORGANIDIN NR)  mg/5 mL syrup Take 10 mLs by mouth every 4 (four) hours as needed for Cough. 240 mL 0    HYDROcodone-acetaminophen (NORCO)  mg per tablet Take 1 tablet by mouth every 6 (six) hours as needed for Pain. 8 tablet 0    verapamil (CALAN-SR) 240 MG CR tablet Take 1 tablet (240 mg total) by mouth once daily. 90 tablet 3    vitamin A 8000 UNIT capsule Take 8,000 Units by mouth once daily.        Allergies: Celebrex [celecoxib]; Cymbalta [duloxetine]; Iodine and iodide containing products; Lovastatin; Pneumococcal 23-rgegorio ps vaccine; Sulpho-lac [sulfur]; Vioxx  [rofecoxib]; and Savella [milnacipran]    History reviewed. No pertinent family history.  Social History     Tobacco Use    Smoking status: Never Smoker    Smokeless tobacco: Never Used   Substance Use Topics    Alcohol use: No    Drug use: No     Review of Systems     Unable to obtain due to aphasia  Objective:     Vitals:    Temp: 98.9 °F (37.2 °C)  Pulse: 108  Rhythm: atrial rhythm  BP: (!) 145/82  MAP (mmHg): 104  Resp: (!) 39  SpO2: 100 %  O2 Device (Oxygen Therapy): nasal cannula    Temp  Min: 97.8 °F (36.6 °C)  Max: 99 °F (37.2 °C)  Pulse  Min: 89  Max: 118  BP  Min: 95/69  Max: 165/105  MAP (mmHg)  Min: 75  Max: 124  Resp  Min: 20  Max: 42  SpO2  Min: 100 %  Max: 100 %    11/30 0701 - 12/01 0700  In: 1358.6 [I.V.:213.6]  Out: 1010 [Urine:910]   Unmeasured Output  Stool Occurrence: 1       Physical Exam    GA: Alert, comfortable, no acute distress.   HEENT: No scleral icterus or JVD.   Pulmonary: Clear to auscultation A/L. No wheezing, crackles, or rhonchi.  Cardiac: RRR S1 & S2 w/o rubs/murmurs/gallops.   Abdominal: Bowel sounds present x 4. No appreciable hepatosplenomegaly.  Skin: No jaundice, rashes, or visible lesions.  Neuro:  --GCS: E4 V1 M5  --Mental Status:  AA, not following commands, aphasic, moves left spontaneously, pain on right lower, and no movement on the RUE.  --CN II-XII grossly intact.   --Pupils 2mm, PERRL.   --Corneal reflex, gag, cough intact.  --LUE strength: 4/5  --LLE strength: 5/5  --RUE strength: 0/5  --RLE strength: 3/5      Today I personally reviewed pertinent medications, lines/drains/airways, imaging, cardiology results, laboratory results, microbiology results, notably:

## 2018-12-01 NOTE — PROGRESS NOTES
[facility]  Neurocritical Care  Progress Note    Admit Date: 11/27/2018  Service Date: 12/01/2018  Length of Stay: 4    Subjective:     Chief Complaint: Embolic stroke involving left carotid artery    History of Present Illness: Ms. 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 The patient initially presented to [facility] with severe right sided weakness and aphasia with a NIH stroke scale of 22. Tpa end time was 1642.     Hospital Course: 11/27: Admit to [facility], TPA, Thrombectomy  11/28: Cards consult for trops>50, Mri stable, possible LHC, Heparin drip .3-.5,   11/29: Awaiting cards decision to do LHC, CTH in am, CXR  12/01: Transfer to Stroke service     Past Medical History:   Diagnosis Date    Atrial fibrillation with RVR 11/28/2018    Depression     Hypertension     Neuropathy      Past Surgical History:   Procedure Laterality Date    APPENDECTOMY      ARTHROPLASTY-HIP-NOLBERTO Right 11/10/2018    Performed by Nasir Danielle MD at [facility] OR    GALLBLADDER SURGERY      HEMIARTHROPLASTY OF HIP Right 11/10/2018    Procedure: ARTHROPLASTY-HIP-NOLBERTO;  Surgeon: Nasir Danielle MD;  Location: [facility] OR;  Service: Orthopedics;  Laterality: Right;  Depuy Bipolar  LAteral positioner with positioners    HYSTERECTOMY      TONSILLECTOMY        No current facility-administered medications on file prior to encounter.      Current Outpatient Medications on File Prior to Encounter   Medication Sig Dispense Refill    amitriptyline (ELAVIL) 10 MG tablet Take 1 tablet (10 mg total) by mouth 2 (two) times daily. 180 tablet 3    calcium carb/vit D3/minerals (CALCIUM CARBONATE-VIT D3-MIN) 600 mg (1,500 mg)-400 unit Chew Take 1 tablet by mouth once daily. 30 each 3    cyanocobalamin (VITAMIN B-12) 250 MCG tablet Take 250 mcg by mouth once daily.      ergocalciferol  (VITAMIN D2) 50,000 unit Cap Take 50,000 Units by mouth every 7 days.      gabapentin (NEURONTIN) 300 MG capsule Take 1 capsule (300 mg total) by mouth every evening. 30 capsule 11    guaifenesin-codeine 100-10 mg/5 ml (TUSSI-ORGANIDIN NR)  mg/5 mL syrup Take 10 mLs by mouth every 4 (four) hours as needed for Cough. 240 mL 0    HYDROcodone-acetaminophen (NORCO)  mg per tablet Take 1 tablet by mouth every 6 (six) hours as needed for Pain. 8 tablet 0    verapamil (CALAN-SR) 240 MG CR tablet Take 1 tablet (240 mg total) by mouth once daily. 90 tablet 3    vitamin A 8000 UNIT capsule Take 8,000 Units by mouth once daily.        Allergies: Celebrex [celecoxib]; Cymbalta [duloxetine]; Iodine and iodide containing products; Lovastatin; Pneumococcal 23-gregorio ps vaccine; Sulpho-lac [sulfur]; Vioxx [rofecoxib]; and Savella [milnacipran]    History reviewed. No pertinent family history.  Social History     Tobacco Use    Smoking status: Never Smoker    Smokeless tobacco: Never Used   Substance Use Topics    Alcohol use: No    Drug use: No     Review of Systems     Unable to obtain due to aphasia  Objective:     Vitals:    Temp: 98.9 °F (37.2 °C)  Pulse: 108  Rhythm: atrial rhythm  BP: (!) 145/82  MAP (mmHg): 104  Resp: (!) 39  SpO2: 100 %  O2 Device (Oxygen Therapy): nasal cannula    Temp  Min: 97.8 °F (36.6 °C)  Max: 99 °F (37.2 °C)  Pulse  Min: 89  Max: 118  BP  Min: 95/69  Max: 165/105  MAP (mmHg)  Min: 75  Max: 124  Resp  Min: 20  Max: 42  SpO2  Min: 100 %  Max: 100 %    11/30 0701 - 12/01 0700  In: 1358.6 [I.V.:213.6]  Out: 1010 [Urine:910]   Unmeasured Output  Stool Occurrence: 1       Physical Exam    GA: Alert, comfortable, no acute distress.   HEENT: No scleral icterus or JVD.   Pulmonary: Clear to auscultation A/L. No wheezing, crackles, or rhonchi.  Cardiac: RRR S1 & S2 w/o rubs/murmurs/gallops.   Abdominal: Bowel sounds present x 4. No appreciable hepatosplenomegaly.  Skin: No jaundice, rashes,  or visible lesions.  Neuro:  --GCS: E4 V1 M5  --Mental Status:  AA, not following commands, aphasic, moves left spontaneously, pain on right lower, and no movement on the RUE.  --CN II-XII grossly intact.   --Pupils 2mm, PERRL.   --Corneal reflex, gag, cough intact.  --LUE strength: 4/5  --LLE strength: 5/5  --RUE strength: 0/5  --RLE strength: 3/5      Today I personally reviewed pertinent medications, lines/drains/airways, imaging, cardiology results, laboratory results, microbiology results, notably:        Assessment/Plan:     Neuro   * Embolic stroke involving left carotid artery    -- Continue Neuro checks q 1hr  -- Vascular Neurology consulted  -- CTA multiphase pending  -- SBP goal <140  -- PT/OT/Speech  -- CXR  -- Electrolyte replace prn  -- Tici 2C reperfusion   -- 11/28 Mri stable  -- 11/29 F/u CTH in am  -- 11/29 F/u CxR  -- 11/30 DNR     Cardiac/Vascular   Atrial fibrillation    -- Metoprolol continued  -- Rate controlled      NSTEMI (non-ST elevated myocardial infarction)    -- Hep drip started per cards rec     Essential hypertension    -- Continue to monitor HR and BP   -- SBP goal < 160  -- Metoprolol             Activity Orders          Straight Cath starting at 11/29 1031        DNR    Linus Carolina NP  Neurocritical Care  [facility]

## 2018-12-01 NOTE — PLAN OF CARE
Problem: Physical Therapy Goal  Goal: Physical Therapy Goal    Goals to be met by 12/9/2018    1. Pt will perform rolling to the R and L with moderate assistance.   2. Pt will perform supine to sit from both sides of the bed with moderate assistance.  3. Pt will perform sit to supine with moderate assistance.  4. Pt will perform sit to stand transfers with max assistance.    5. Pt will perform bed <> chair transfers with max assistance.  6. Pt will perform gait x 10 feet with max assistance using RW.  7. Pt will sit EOB x 5 minutes with min assistance and no LOB to prepare for functional tasks in sitting.    Outcome: Ongoing (interventions implemented as appropriate)  Patient not following commands. Tolerated ROM well.  POC and goals remain appropriate.  Please refer to the progress note for functional mobility.     Heather Chino, PT  12/1/2018  [phone] (pager)

## 2018-12-01 NOTE — NURSING TRANSFER
Nursing Transfer Note      12/1/2018     Transfer To: 702A    Transfer via bed    Transfer with cardiac monitoring    Transported by RN x2    Medicines sent: heparin gtt    Chart send with patient: Yes    Notified: son    Patient reassessed at: .1545 on 12/1/2018    Upon arrival to floor: cardiac monitor applied, patient oriented to room, call bell in reach and bed in lowest position

## 2018-12-02 PROBLEM — I50.9 CONGESTIVE HEART FAILURE (CHF): Status: ACTIVE | Noted: 2018-12-02

## 2018-12-02 LAB
ALBUMIN SERPL BCP-MCNC: 2.7 G/DL
ALP SERPL-CCNC: 151 U/L
ALT SERPL W/O P-5'-P-CCNC: 82 U/L
ANION GAP SERPL CALC-SCNC: 9 MMOL/L
APTT BLDCRRT: 52.8 SEC
AST SERPL-CCNC: 147 U/L
BASOPHILS # BLD AUTO: 0.03 K/UL
BASOPHILS NFR BLD: 0.2 %
BILIRUB SERPL-MCNC: 0.4 MG/DL
BNP SERPL-MCNC: 2940 PG/ML
BUN SERPL-MCNC: 49 MG/DL
CALCIUM SERPL-MCNC: 8.6 MG/DL
CHLORIDE SERPL-SCNC: 110 MMOL/L
CO2 SERPL-SCNC: 23 MMOL/L
CREAT SERPL-MCNC: 0.8 MG/DL
DIFFERENTIAL METHOD: ABNORMAL
EOSINOPHIL # BLD AUTO: 0 K/UL
EOSINOPHIL NFR BLD: 0.1 %
ERYTHROCYTE [DISTWIDTH] IN BLOOD BY AUTOMATED COUNT: 13.9 %
EST. GFR  (AFRICAN AMERICAN): >60 ML/MIN/1.73 M^2
EST. GFR  (NON AFRICAN AMERICAN): >60 ML/MIN/1.73 M^2
GLUCOSE SERPL-MCNC: 140 MG/DL
HCT VFR BLD AUTO: 35.3 %
HGB BLD-MCNC: 10.8 G/DL
IMM GRANULOCYTES # BLD AUTO: 0.18 K/UL
IMM GRANULOCYTES NFR BLD AUTO: 1.1 %
INR PPP: 1.3
LYMPHOCYTES # BLD AUTO: 2 K/UL
LYMPHOCYTES NFR BLD: 11.8 %
MAGNESIUM SERPL-MCNC: 2.3 MG/DL
MCH RBC QN AUTO: 29.4 PG
MCHC RBC AUTO-ENTMCNC: 30.6 G/DL
MCV RBC AUTO: 96 FL
MONOCYTES # BLD AUTO: 1.3 K/UL
MONOCYTES NFR BLD: 8.1 %
NEUTROPHILS # BLD AUTO: 13.1 K/UL
NEUTROPHILS NFR BLD: 78.7 %
NRBC BLD-RTO: 0 /100 WBC
PHOSPHATE SERPL-MCNC: 4.1 MG/DL
PLATELET # BLD AUTO: 538 K/UL
PMV BLD AUTO: 10.1 FL
POCT GLUCOSE: 102 MG/DL (ref 70–110)
POCT GLUCOSE: 105 MG/DL (ref 70–110)
POCT GLUCOSE: 109 MG/DL (ref 70–110)
POCT GLUCOSE: 118 MG/DL (ref 70–110)
POTASSIUM SERPL-SCNC: 4.8 MMOL/L
PROT SERPL-MCNC: 5.9 G/DL
PROTHROMBIN TIME: 13.5 SEC
RBC # BLD AUTO: 3.67 M/UL
SODIUM SERPL-SCNC: 142 MMOL/L
WBC # BLD AUTO: 16.62 K/UL

## 2018-12-02 PROCEDURE — 99233 SBSQ HOSP IP/OBS HIGH 50: CPT | Mod: ,,, | Performed by: PSYCHIATRY & NEUROLOGY

## 2018-12-02 PROCEDURE — 25000242 PHARM REV CODE 250 ALT 637 W/ HCPCS: Performed by: NURSE PRACTITIONER

## 2018-12-02 PROCEDURE — 94640 AIRWAY INHALATION TREATMENT: CPT

## 2018-12-02 PROCEDURE — 63600175 PHARM REV CODE 636 W HCPCS: Performed by: NURSE PRACTITIONER

## 2018-12-02 PROCEDURE — 99233 SBSQ HOSP IP/OBS HIGH 50: CPT | Mod: ,,, | Performed by: HOSPITALIST

## 2018-12-02 PROCEDURE — 20600001 HC STEP DOWN PRIVATE ROOM

## 2018-12-02 PROCEDURE — 94761 N-INVAS EAR/PLS OXIMETRY MLT: CPT

## 2018-12-02 PROCEDURE — 80053 COMPREHEN METABOLIC PANEL: CPT

## 2018-12-02 PROCEDURE — 94668 MNPJ CHEST WALL SBSQ: CPT

## 2018-12-02 PROCEDURE — 85025 COMPLETE CBC W/AUTO DIFF WBC: CPT

## 2018-12-02 PROCEDURE — 83880 ASSAY OF NATRIURETIC PEPTIDE: CPT

## 2018-12-02 PROCEDURE — 27000190 HC CPAP FULL FACE MASK W/VALVE

## 2018-12-02 PROCEDURE — 85610 PROTHROMBIN TIME: CPT

## 2018-12-02 PROCEDURE — 85730 THROMBOPLASTIN TIME PARTIAL: CPT

## 2018-12-02 PROCEDURE — 31720 CLEARANCE OF AIRWAYS: CPT

## 2018-12-02 PROCEDURE — 27000221 HC OXYGEN, UP TO 24 HOURS

## 2018-12-02 PROCEDURE — 99900035 HC TECH TIME PER 15 MIN (STAT)

## 2018-12-02 PROCEDURE — 25000003 PHARM REV CODE 250: Performed by: NURSE PRACTITIONER

## 2018-12-02 PROCEDURE — 83735 ASSAY OF MAGNESIUM: CPT

## 2018-12-02 PROCEDURE — 84100 ASSAY OF PHOSPHORUS: CPT

## 2018-12-02 PROCEDURE — 94660 CPAP INITIATION&MGMT: CPT

## 2018-12-02 PROCEDURE — 25000003 PHARM REV CODE 250: Performed by: PHYSICIAN ASSISTANT

## 2018-12-02 PROCEDURE — 63600175 PHARM REV CODE 636 W HCPCS: Performed by: STUDENT IN AN ORGANIZED HEALTH CARE EDUCATION/TRAINING PROGRAM

## 2018-12-02 RX ORDER — FUROSEMIDE 10 MG/ML
40 INJECTION INTRAMUSCULAR; INTRAVENOUS 2 TIMES DAILY
Status: DISCONTINUED | OUTPATIENT
Start: 2018-12-02 | End: 2018-12-03

## 2018-12-02 RX ORDER — IPRATROPIUM BROMIDE AND ALBUTEROL SULFATE 2.5; .5 MG/3ML; MG/3ML
3 SOLUTION RESPIRATORY (INHALATION) EVERY 6 HOURS PRN
Status: DISCONTINUED | OUTPATIENT
Start: 2018-12-02 | End: 2018-12-04 | Stop reason: HOSPADM

## 2018-12-02 RX ORDER — NAPROXEN SODIUM 220 MG/1
81 TABLET, FILM COATED ORAL DAILY
Status: DISCONTINUED | OUTPATIENT
Start: 2018-12-02 | End: 2018-12-03

## 2018-12-02 RX ORDER — LISINOPRIL 2.5 MG/1
2.5 TABLET ORAL DAILY
Status: DISCONTINUED | OUTPATIENT
Start: 2018-12-02 | End: 2018-12-03

## 2018-12-02 RX ORDER — MAGNESIUM SULFATE HEPTAHYDRATE 40 MG/ML
2 INJECTION, SOLUTION INTRAVENOUS ONCE
Status: COMPLETED | OUTPATIENT
Start: 2018-12-02 | End: 2018-12-03

## 2018-12-02 RX ORDER — ACETYLCYSTEINE 100 MG/ML
4 SOLUTION ORAL; RESPIRATORY (INHALATION)
Status: DISCONTINUED | OUTPATIENT
Start: 2018-12-02 | End: 2018-12-04 | Stop reason: HOSPADM

## 2018-12-02 RX ORDER — FUROSEMIDE 10 MG/ML
40 INJECTION INTRAMUSCULAR; INTRAVENOUS ONCE
Status: COMPLETED | OUTPATIENT
Start: 2018-12-02 | End: 2018-12-02

## 2018-12-02 RX ORDER — ASPIRIN 81 MG/1
81 TABLET ORAL DAILY
Status: DISCONTINUED | OUTPATIENT
Start: 2018-12-02 | End: 2018-12-02

## 2018-12-02 RX ADMIN — IPRATROPIUM BROMIDE AND ALBUTEROL SULFATE 3 ML: .5; 3 SOLUTION RESPIRATORY (INHALATION) at 08:12

## 2018-12-02 RX ADMIN — ACETYLCYSTEINE 4 ML: 100 INHALANT RESPIRATORY (INHALATION) at 12:12

## 2018-12-02 RX ADMIN — FUROSEMIDE 40 MG: 10 INJECTION, SOLUTION INTRAMUSCULAR; INTRAVENOUS at 11:12

## 2018-12-02 RX ADMIN — METOPROLOL TARTRATE 50 MG: 50 TABLET ORAL at 02:12

## 2018-12-02 RX ADMIN — IPRATROPIUM BROMIDE AND ALBUTEROL SULFATE 3 ML: .5; 3 SOLUTION RESPIRATORY (INHALATION) at 05:12

## 2018-12-02 RX ADMIN — FUROSEMIDE 40 MG: 10 INJECTION, SOLUTION INTRAMUSCULAR; INTRAVENOUS at 06:12

## 2018-12-02 RX ADMIN — IPRATROPIUM BROMIDE AND ALBUTEROL SULFATE 3 ML: .5; 3 SOLUTION RESPIRATORY (INHALATION) at 12:12

## 2018-12-02 RX ADMIN — ACETYLCYSTEINE 4 ML: 100 INHALANT RESPIRATORY (INHALATION) at 05:12

## 2018-12-02 RX ADMIN — FUROSEMIDE 40 MG: 10 INJECTION, SOLUTION INTRAMUSCULAR; INTRAVENOUS at 03:12

## 2018-12-02 RX ADMIN — IPRATROPIUM BROMIDE AND ALBUTEROL SULFATE 3 ML: .5; 3 SOLUTION RESPIRATORY (INHALATION) at 09:12

## 2018-12-02 RX ADMIN — MAGNESIUM SULFATE IN WATER 2 G: 40 INJECTION, SOLUTION INTRAVENOUS at 10:12

## 2018-12-02 RX ADMIN — ACETYLCYSTEINE 4 ML: 100 INHALANT RESPIRATORY (INHALATION) at 09:12

## 2018-12-02 RX ADMIN — METOPROLOL TARTRATE 50 MG: 50 TABLET ORAL at 10:12

## 2018-12-02 NOTE — PROGRESS NOTES
RN Proactive Rounding Note  Time of Visit: 0150    Admit Date: 2018  LOS: 5  Code Status: DNR   Date of Visit: 2018  : 1929  Age: 89 y.o.  Sex: female  Race: White  Bed: 2/2 A:   MRN: [id number]  Was the patient discharged from an ICU this admission? yes   Was the patient discharged from a PACU within last 24 hours?  no  Did the patient receive conscious sedation/general anesthesia in last 24 hours?  no  Was the patient in the ED within the past 24 hours?  no  Was the patient started on NIPPV within the past 24 hours?  no  Attending Physician: Heather García MD  Primary Service: Networked reference to record PCT       ASSESSMENT:     Abnormal Vital Signs: RR 36  Clinical Issues: Respiratory     INTERVENTIONS/ RECOMMENDATIONS:     MEWs triggered rounding on patient d/t tachypnea. On assessment patient RR in mid 30s. Spo2 stable in high 90s. LS with crackles to bases bilaterally. Pt also tachycardic with HR in low 100s (afib). No recorded UO this shift 145ml during day shift. Last CXR  showed moderate pulmonary edema.     Recommend CXR, pending results pt may benefit from lasix, BiPAP.    Discussed plan of care with RNElsa.    PHYSICIAN ESCALATION:     Yes/No  yes    Orders received and case discussed with NP Lashell Butler .    Disposition: Remain in room 702.    FOLLOW-UP/CONTINGENCY:     Call back the Rapid Response Nurse at x [phone] for additional questions or concerns.

## 2018-12-02 NOTE — PROGRESS NOTES
Rapid Response Follow-up Note    Followed up with patient for proactive rounding.   Pt with thick secretions, audible in back of throat. Remains tachypnic,Spo2 98%,RR 36. NP Liscum notified, mucomyst nebulizer, Bipap, and IV Lasix ordered. Primary nurse Elsa notified and at bedside.   Please call Rapid Response RN with any questions or concerns at  [phone].

## 2018-12-02 NOTE — PLAN OF CARE
Problem: Patient Care Overview  Goal: Plan of Care Review  Outcome: Ongoing (interventions implemented as appropriate)  POC reviewed with patient and daughter-in-law. All questions and concerns reviewed. Fall/safety precautions implemented and maintained. Pure wick care provided. Patient tachycardic and tachypneic. O2 SAT 98%. Positioned upright,  intermittent suction provided. Tube feedings discontinued. New order: Lasix 40mg injection-one time dose administered per SARAH Reid. Following interventions, patient calm and ease of breathing noted. Blood glucose monitored. APTT 52.8 therapeutic range. Continuous Heparin gtt 14u/kg/hr 8.9ml/hr. Will continue to monitor.

## 2018-12-02 NOTE — CONSULTS
[facility] Hospital Medicine  Consult Note    Patient Name: Apple Watson  MRN: [id number]  Admission Date: 11/27/2018  Hospital Length of Stay: 5 days  Attending Physician: Heather García MD   Primary Care Provider: Tree Rolon MD     [facility] Medicine Team: Networked reference to record PCT  Rodger Aviles MD      Patient information was obtained from relative(s), caregiver / friend, past medical records and ER records.     Inpatient consult to Hospital Medicine - Stroke Comanagement  Consult performed by: Rodger Aviles MD  Consult ordered by: Lashell Butler NP        Subjective:     Principal Problem: Embolic stroke involving left carotid artery    Chief Complaint:   Chief Complaint   Patient presents with    TPA transfer     from [facility]        HPI: Ms. 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 The patient initially presented to [facility] with severe right sided weakness and aphasia with a NIH stroke scale of 22. Tpa end time was 1642.     Hospital medicine consulted for assistance with CHF therapy.     Past Medical History:   Diagnosis Date    Atrial fibrillation with RVR 11/28/2018    Depression     Hypertension     Neuropathy        Past Surgical History:   Procedure Laterality Date    APPENDECTOMY      ARTHROPLASTY-HIP-NOLBERTO Right 11/10/2018    Performed by Nasir Danielle MD at [facility] OR    GALLBLADDER SURGERY      HEMIARTHROPLASTY OF HIP Right 11/10/2018    Procedure: ARTHROPLASTY-HIP-NOLBERTO;  Surgeon: Nasir Danielle MD;  Location: [facility] OR;  Service: Orthopedics;  Laterality: Right;  Depuy Bipolar  LAteral positioner with positioners    HYSTERECTOMY      TONSILLECTOMY         Review of patient's allergies indicates:   Allergen Reactions    Celebrex [celecoxib] Other (See Comments)     Blood in bowel    Cymbalta  [duloxetine]     Iodine and iodide containing products     Lovastatin     Pneumococcal 23-gregorio ps vaccine      Per chart    Sulpho-lac [sulfur]     Vioxx [rofecoxib]     Savella [milnacipran] Rash       No current facility-administered medications on file prior to encounter.      Current Outpatient Medications on File Prior to Encounter   Medication Sig    amitriptyline (ELAVIL) 10 MG tablet Take 1 tablet (10 mg total) by mouth 2 (two) times daily.    calcium carb/vit D3/minerals (CALCIUM CARBONATE-VIT D3-MIN) 600 mg (1,500 mg)-400 unit Chew Take 1 tablet by mouth once daily.    cyanocobalamin (VITAMIN B-12) 250 MCG tablet Take 250 mcg by mouth once daily.    ergocalciferol (VITAMIN D2) 50,000 unit Cap Take 50,000 Units by mouth every 7 days.    gabapentin (NEURONTIN) 300 MG capsule Take 1 capsule (300 mg total) by mouth every evening.    guaifenesin-codeine 100-10 mg/5 ml (TUSSI-ORGANIDIN NR)  mg/5 mL syrup Take 10 mLs by mouth every 4 (four) hours as needed for Cough.    HYDROcodone-acetaminophen (NORCO)  mg per tablet Take 1 tablet by mouth every 6 (six) hours as needed for Pain.    verapamil (CALAN-SR) 240 MG CR tablet Take 1 tablet (240 mg total) by mouth once daily.    vitamin A 8000 UNIT capsule Take 8,000 Units by mouth once daily.     Family History     None        Tobacco Use    Smoking status: Never Smoker    Smokeless tobacco: Never Used   Substance and Sexual Activity    Alcohol use: No    Drug use: No    Sexual activity: No     Review of Systems   Unable to perform ROS: Acuity of condition     Objective:     Vital Signs (Most Recent):  Temp: 96.3 °F (35.7 °C) (12/02/18 1121)  Pulse: (!) 111 (12/02/18 1237)  Resp: 12 (12/02/18 1237)  BP: 112/79 (12/02/18 0819)  SpO2: 99 % (12/02/18 1237) Vital Signs (24h Range):  Temp:  [96.3 °F (35.7 °C)-98.6 °F (37 °C)] 96.3 °F (35.7 °C)  Pulse:  [] 111  Resp:  [12-36] 12  SpO2:  [97 %-100 %] 99 %  BP: (112-163)/() 112/79      Weight: 63.5 kg (139 lb 15.9 oz)  Body mass index is 28.6 kg/m².    Physical Exam   Constitutional:   Pt non-verbal   HENT:   Head: Normocephalic and atraumatic.   Cardiovascular: Normal heart sounds and intact distal pulses.   Pulmonary/Chest: She has wheezes. She has rales.   Abdominal: Soft. Bowel sounds are normal. She exhibits no mass. There is no tenderness. There is no guarding.   Musculoskeletal: She exhibits no edema.   Neurological:   Pt able to squeeze hands on command. Strength greater L>R    Skin: Skin is warm.   Nursing note and vitals reviewed.      Significant Labs: All pertinent labs within the past 24 hours have been reviewed.    Significant Imaging: I have reviewed and interpreted all pertinent imaging results/findings within the past 24 hours.    Assessment/Plan:     Congestive heart failure (CHF)    Pt presents w/ new onset evidence suggesting CHF. Recent echo (11/28) reveals EF 45%, decreased from 60% (11/12). Crackles and productive cough present on physical exam. Pt's family denies history of CHF.    - BNP 2940 (12/1)  - CXR shows evidence of congestion and bilateral airspace opacities suggestive of pulmonary edema  - TTE 11/29:  · Severe left atrial enlargement.  · Mildly decreased left ventricular systolic function. The estimated ejection fraction is 45%  · Septal wall has abnormal motion.  · Left ventricular diastolic dysfunction.  · Moderate-to-severe mitral regurgitation.  · Moderate to severe tricuspid regurgitation.  · The estimated PA systolic pressure is 45.77 mm Hg    Recommendations:  - Strict I/O's   - Daily Weights   - 1.5L Fluid restriction  - Continue to wean O2 as tolerated   - Start Lasix 40 IV BID; Will titrate as needed   - Start ACEi 2.5mg daily   - Defer to neuro regarding initiation of Plavix and ASA per cardiology recs   - Will defer to primary or cardiology to address status of recent NSTEMI. Pt's family has many questions regarding therapy and etiology.                 VTE Risk Mitigation (From admission, onward)        Ordered     heparin 25,000 units in dextrose 5% 250 ml (100 units/mL) infusion MINIMAL INTENSITY nomogram - [facility]  Continuous      11/28/18 1857     Reason for No Pharmacological VTE Prophylaxis  Once      11/27/18 1743     IP VTE HIGH RISK PATIENT  Once      11/27/18 1743     Place sequential compression device  Until discontinued      11/27/18 1743              Thank you for your consult. I will follow-up with patient. Please contact us if you have any additional questions.    Rodger Aviles MD  Department of Hospital Medicine   [facility]                      12/02/2018                             STAFF PHYSICIAN NOTE                                   Attending Attestation for Rounds with Resident  I have reviewed and concur with the resident's history, physical, assessment, and plan.  I have personally interviewed and examined the patient at bedside and agree with the resident's findings.                                  ________________________________________                                     REASON FOR ADMISSION:     Patient is 89 y.o.female    Body mass index is 28.6 kg/m².,  Embolic stroke involving left carotid artery    PROBLEM(S):  Family expresesd patient's wishes for DNR code status and they would like to discuss risk/ benefits of PEG w neurology service.  They are not sure if patient would want it.     Flavia Rucker MD

## 2018-12-02 NOTE — PROGRESS NOTES
[facility]  Vascular Neurology  Comprehensive Stroke Center  Progress Note    Assessment/Plan:     * Embolic stroke involving left carotid artery    Apple Watson is a 89 y.o. female with PMHx of HTN and new onset a fib who presented to OSH with L MCA syndrome. She was treated with tPA and transferred to [facility]. CTA with L ICA occlusion, patient taken to IR for possible intervention.     11/29/18: Patient doing much better, able to give thumbs up with both hands, and nodding head appropriately.   11/30/18: Patient not following commands like she was yesterday. More obtunded and lethargic.   11/30 @0300 CT head showed stable, evolving infarct    Recommendations:  -Rate control afib with metoprolol  -Continue heparin gtt for 48hrs, switch to NOAC or bridge to warfarin   -Continue Atrov 40 mg dialy   -SBP goal 140-180  -Aggressive PT/OT/SLP  -Will need discussion regarding PEG placement        Antithrombotics for secondary stroke prevention:  Heparin gtt for 48hrs and then transition to NOAC.    Statins for secondary stroke prevention and hyperlipidemia, if present:   Statins: Atorvastatin- 40 mg daily    Aggressive risk factor modification: HTN, A-Fib     Rehab efforts: Occupational Therapy, PT/OT/SLP to evaluate and treat, PM&R consult     Diagnostics ordered/pending: None    VTE prophylaxis: None: Reason for No Pharmacological VTE Prophylaxis: Mechanical prophylaxis: Place SCDs    BP parameters: Infarct: Post tPA, SBP <180         Atrial fibrillation with RVR    First noted on EKG post hip replacement surgery, 11/11/18, No anticoagulation was initiated at that time.   A fib noted on monitor on arrival in ED, EKG obtained confirmed afib RVR  Will need to discuss secondary stroke prevention with patient and family    Atrial Fibrillation Assessment ([id number]):       Condition Points    C   Congestive heart failure (or Left ventricular systolic dysfunction) 0    H Hypertension: blood pressure  consistently above 140/90 mmHg (or treated hypertension on medication) 1    A2  Age ?75 years 2    D  Diabetes Mellitus 0    S2  Prior Stroke or TIA or Thromboembolism  2    V  Vascular disease (e.g. peripheral artery disease, myocardial infarction, aortic plaque) ?    A  Age 65-74 years 0    Sc  Sex category (i.e. female sex) 1                                                                                  [id number] Score: 6  [id number] Score Stroke Risk %   6 9.8                                                                                  Annual Stroke Risk:  9.8%    Score Risk Anticoagulation Therapy Considerations   2 or greater High Oral anticoagulant Oral anticoagulant, using either a new oral anticoagulant drug   (apixaban, rivaroxaban or dabigatran) or well controlled Warfarin at INR 2.0-3.0             Recommendation:     -[id number] score of 6= 9.% risk.    -If transition to warfarin, would likely need heparin bridge   -Would begin patient on AC prior to discharge, if no other contraindication.    -Rate control with Metoprolol tartrate while inpatient and transition to 24h metoprolol succinate prior to d/c.    -Cardiology following              Received intravenous tissue plasminogen activator (tPA) in emergency department    Admitting to neuro ICU for post tPA monitoring  -- Stepped down on 12/1     Atrial fibrillation    Stroke risk factor  First noted on EKG post hip replacement surgery, 11/11/18  No anticoagulation initiated  Rate treated with metoprolol  Patient returned to normal sinus rhythm spontaneously  -- EKG on admit showed Afib with RVR with repeat showing the same.  -- On heparin gtt as she also had an NSTEMI  -- Will transition to NOAC after consulting with Cardiology.     NSTEMI (non-ST elevated myocardial infarction)    Trop >50  NSTEMI  -- Cardiology following, appreciate recs.  -- On heparin gtt     Closed fracture of neck of right femur    Recent surgery on 11/10/18      Essential hypertension    Stroke risk factor  SBP <180 post tPA  BP currently at goal          12/01/2018 On heparin gtt. Neurologically she is fluctuating but is still drowsy and not following commands. She is DNR/DNI. Will need discussion for PEG placement.  Repeat ABG shows pH of 7.50 (7.51 from yesterday) with pCO2 of 32 (down from 36) consistent with resp alkalosis. No evidence of hypoxia.     STROKE DOCUMENTATION   Acute Stroke Times   Last Known Normal Date: 11/27/18  Last Known Normal Time: 1300  Symptom Onset Date: 11/27/18  Symptom Onset Time: 1330  Stroke Team Called Date: 11/27/18  Stroke Team Called Time: 1649  Stroke Team Arrival Date: 11/27/18  Stroke Team Arrival Time: 1650  CT Interpretation Time: 1718  Decision to Treat Time for Alteplase: (administed at OSH via telemedicine)  Decision to Treat Time for IR: 1732    NIH Scale:  1a. Level Of Consciousness: 2-->Not alert: requires repeated stimulation to attend, or is obtunded and requires strong or painful stimulation to make movements (not stereotyped)  1b. LOC Questions: 2-->Answers neither question correctly  1c. LOC Commands: 2-->Performs neither task correctly  2. Best Gaze: 1-->Partial gaze palsy: gaze is abnormal in one or both eyes, but forced deviation or total gaze paresis is not present  3. Visual: 0-->No visual loss  4. Facial Palsy: 2-->Partial paralysis (total or near-total paralysis of lower face)  5a. Motor Arm, Left: 1-->Drift: limb holds 90 (or 45) degrees, but drifts down before full 10 seconds: does not hit bed or other support  5b. Motor Arm, Right: 2-->Some effort against gravity: limb cannot get to or maintain (if cued) 90 (or 45) degrees, drifts down to bed, but has some effort against gravity  6a. Motor Leg, Left: 2-->Some effort against gravity: leg falls to bed by 5 secs, but has some effort against gravity  6b. Motor Leg, Right: 3-->No effort against gravity: leg falls to bed immediately  7. Limb Ataxia: 1-->Present in  one limb(Unable to assess.)  8. Sensory: 1-->Mild-to-moderate sensory loss: patient feels pinprick is less sharp or is dull on the affected side: or there is a loss of superficial pain with pinprick, but patient is aware of being touched(Unable to assess.)  9. Best Language: 3-->Mute, global aphasia: no usable speech or auditory comprehension  10. Dysarthria: 2-->Severe dysarthria: patients speech is so slurred as to be unintelligible in the absence of or out of proportion to any dysphasia, or is mute/anarthric  11. Extinction and Inattention (formerly Neglect): 0-->No abnormality  Total (NIH Stroke Scale): 24       Modified Cuco Score: 3  Mor Coma Scale:    ABCD2 Score:    [id number] Score:   HAS -BLED Score:   ICH Score:   Hunt & Tapia Classification:      Hemorrhagic change of an Ischemic Stroke: Does this patient have an ischemic stroke with hemorrhagic changes? No     Neurologic Chief Complaint:  L ICA terminus occlusion  RSW, RFD, L-gaze, Mute    Subjective:     Interval History: Patient is seen for follow-up neurological assessment and treatment recommendations: Acute onset RSW with aphasia, Imaging showed left carotid artery infarct s/p tPa and thrombectomy.  Currently on heparin gtt for new onset Afib. Will switch to NOAC.    HPI, Past Medical, Family, and Social History remains the same as documented in the initial encounter.     Review of Systems   Unable to perform ROS: Patient nonverbal     Scheduled Meds:   albuterol-ipratropium  3 mL Nebulization Q6H    atorvastatin  40 mg Oral Daily    metoprolol tartrate  50 mg Per NG tube Q6H    sodium chloride 0.9%  3 mL Intravenous Q8H     Continuous Infusions:   heparin (porcine) in D5W 14 Units/kg/hr (12/01/18 1912)     PRN Meds:acetaminophen, dextrose 50%, glucagon (human recombinant), insulin aspart U-100, magnesium oxide, magnesium oxide, ondansetron, potassium chloride 10%, potassium chloride 10%, potassium chloride 10%, potassium, sodium  phosphates, potassium, sodium phosphates, potassium, sodium phosphates, sodium chloride 0.9%    Objective:     Vital Signs (Most Recent):  Temp: 98.6 °F (37 °C) (12/01/18 1400)  Pulse: (!) 118 (12/01/18 1900)  Resp: (!) 28 (12/01/18 1805)  BP: (!) 163/119 (12/01/18 1500)  SpO2: 98 % (12/01/18 1805)  BP Location: Right arm    Vital Signs Range (Last 24H):  Temp:  [97.8 °F (36.6 °C)-98.9 °F (37.2 °C)]   Pulse:  []   Resp:  [20-42]   BP: (108-163)/()   SpO2:  [98 %-100 %]   BP Location: Right arm    Physical Exam   Eyes: Pupils are equal, round, and reactive to light.   Cardiovascular: Normal rate.   Pulmonary/Chest:   Tachypnea+   Neurological:   Withdraws to pain.   Nursing note and vitals reviewed.      Neurological Exam:   LOC: Nonverbal, obtunded  Language: Global aphasia  Visual Fields: Full  EOM (CN III, IV, VI): Gaze preference  left  Pupils (CN II, III): PERRL  Motor: Unable to assess strength as patient is non verbal and not following commands. However she seem weaker on the right than the left.    Laboratory:  BMP:   Recent Labs   Lab 12/01/18  0246      K 3.8      CO2 24   BUN 40*   CREATININE 0.8   CALCIUM 8.5*     CBC:   Recent Labs   Lab 12/01/18  0246   WBC 16.65*   RBC 3.61*   HGB 10.8*   HCT 34.1*   *   MCV 95   MCH 29.9   MCHC 31.7*     Coagulation:   Recent Labs   Lab 12/01/18  0246   INR 1.2   APTT 51.5*     Hgb A1C:   Recent Labs   Lab 11/28/18  0100   HGBA1C 5.0     TSH:   Recent Labs   Lab 11/28/18  0100   TSH 0.834       Diagnostic Results     Brain imaging:  CT Head. Date: 11/30/18 @0300  Continued temporal evolution of left MCA territory distribution acute infarct.  No evidence of midline shift or new large parenchymal hemorrhage.        MRI brain 11/28/18:  -Moderate size regions of signal abnormality left MCA territory compatible with recent infarction.   -Please note small component of hemorrhage within the left mesial temporal component of infarction.  -In  addition there is superimposed small region of recent infarction in the right inferior frontal lobe.  -Generalized cerebral volume loss with scattered T2 FLAIR signal hyperintensity supratentorial white matter and lilly concerning for underlying chronic ischemic change.  -No evidence for hydrocephalus with left middle cranial fossa probable rack node cyst        CT Head. Date: 11/27/18  No CT evidence of acute intracranial abnormality.     Vessel Imaging:  CTA Multiphase. Date: 11/27/18  Partial opacification of the left cervical ICA, without meaningful opacification of the ICA distally or intracranially, findings suggests thrombus, either within the vessel itself, or the carotid terminus.  There is some flow within the distal M2 and M3 branches on the left, with improved flow on phase 2 and face 3 however no meaningful flow is identified within the M1 segment on the left.  These findings likely correlate with vague hypoattenuation seen within the region of the left basal ganglia and partially left caudate/subinsular region.    No additional regions of high-grade stenosis or occlusion, please see above for full details.        Cardiac Evaluation:   TTE 11/28/18:  · Severe left atrial enlargement.  · Mildly decreased left ventricular systolic function. The estimated ejection fraction is 45%  · Septal wall has abnormal motion.  · Left ventricular diastolic dysfunction.  · Moderate-to-severe mitral regurgitation.  · Moderate to severe tricuspid regurgitation.  · The estimated PA systolic pressure is 45.77 mm Hg  · Normal central venous pressure (3 mm Hg).      West Negro MD  Comprehensive Stroke Center  Department of Vascular Neurology   [facility]

## 2018-12-02 NOTE — ASSESSMENT & PLAN NOTE
Apple Watson is a 89 y.o. female with PMHx of HTN and new onset a fib who presented to OSH with L MCA syndrome. She was treated with tPA and [place] [facility] CTA with L ICA occlusion, patient taken to IR for possible intervention.     11/29/18: Patient doing much better, able to give thumbs up with both hands, and nodding head appropriately.   11/30/18: Patient not following commands like she was yesterday. More obtunded and lethargic.   11/30 @0300 CT head showed stable, evolving infarct    Recommendations:  -Rate control afib with metoprolol  -Continue heparin gtt for 48hrs, switch to NOAC or bridge to warfarin   -Continue Atrov 40 mg dialy   -SBP goal 140-180  -Aggressive PT/OT/SLP  -Will need discussion regarding PEG placement        Antithrombotics for secondary stroke prevention:  Heparin gtt for 48hrs and then transition to NOAC.    Statins for secondary stroke prevention and hyperlipidemia, if present:   Statins: Atorvastatin- 40 mg daily    Aggressive risk factor modification: HTN, A-Fib     Rehab efforts: Occupational Therapy, PT/OT/SLP to evaluate and treat, PM&R consult     Diagnostics ordered/pending: None    VTE prophylaxis: None: Reason for No Pharmacological VTE Prophylaxis: Mechanical prophylaxis: Place SCDs    BP parameters: Infarct: Post tPA, SBP <180

## 2018-12-02 NOTE — ASSESSMENT & PLAN NOTE
First noted on EKG post hip replacement surgery, 11/11/18, No anticoagulation was initiated at that time.   A fib noted on monitor on arrival in ED, EKG obtained confirmed afib RVR  Will need to discuss secondary stroke prevention with patient and family    Atrial Fibrillation Assessment ([id number]):       Condition Points    C   Congestive heart failure (or Left ventricular systolic dysfunction) 0    H Hypertension: blood pressure consistently above 140/90 mmHg (or treated hypertension on medication) 1    A2  Age ?75 years 2    D  Diabetes Mellitus 0    S2  Prior Stroke or TIA or Thromboembolism  2    V  Vascular disease (e.g. peripheral artery disease, myocardial infarction, aortic plaque) ?    A  Age 65-74 years 0    Sc  Sex category (i.e. female sex) 1                                                                                  [id number] Score: 6  [id number] Score Stroke Risk %   6 9.8                                                                                  Annual Stroke Risk:  9.8%    Score Risk Anticoagulation Therapy Considerations   2 or greater High Oral anticoagulant Oral anticoagulant, using either a new oral anticoagulant drug   (apixaban, rivaroxaban or dabigatran) or well controlled Warfarin at INR 2.0-3.0             Recommendation:     -[id number] score of 6= 9.% risk.    -If transition to warfarin, would likely need heparin bridge   -Would begin patient on AC prior to discharge, if no other contraindication.    -Rate control with Metoprolol tartrate while inpatient and transition to 24h metoprolol succinate prior to d/c.    -Cardiology following

## 2018-12-02 NOTE — ASSESSMENT & PLAN NOTE
Pt presents w/ new onset evidence suggesting CHF. Recent echo (11/28) reveals EF 45%, decreased from 60% (11/12). Crackles and productive cough present on physical exam. Pt's family denies history of CHF.    - BNP 2940 (12/1)  - CXR shows evidence of congestion and bilateral airspace opacities suggestive of pulmonary edema  - TTE 11/29:  · Severe left atrial enlargement.  · Mildly decreased left ventricular systolic function. The estimated ejection fraction is 45%  · Septal wall has abnormal motion.  · Left ventricular diastolic dysfunction.  · Moderate-to-severe mitral regurgitation.  · Moderate to severe tricuspid regurgitation.  · The estimated PA systolic pressure is 45.77 mm Hg    Recommendations:  - Strict I/O's   - Daily Weights   - 1.5L Fluid restriction  - Continue to wean O2 as tolerated   - Start Lasix 40 IV BID; Will titrate as needed   - Start ACEi 2.5mg daily   - Defer to neuro regarding initiation of Plavix and ASA per cardiology recs   - Will defer to primary or cardiology to address status of recent NSTEMI. Pt's family has many questions regarding therapy and etiology.

## 2018-12-02 NOTE — PLAN OF CARE
Problem: Patient Care Overview  Goal: Plan of Care Review  Outcome: Ongoing (interventions implemented as appropriate)  Pt transferred from [facility]U, no acute issues-- VSS, NC 2L, heparin gtt/14 units.  MELONIEM.

## 2018-12-02 NOTE — HPI
Ms. Watson is a 89 year old female with a pmhx of Htn, New onset afib, and Neuropathy who presents to [facility] for a higher level of care for LMCA syndrome s/p TPA and LICA Thrombectomy. The patient initially presented to [facility] with severe right sided weakness and aphasia with a NIH stroke scale of 22. Tpa end time was 1642.     Hospital medicine consulted for assistance with CHF therapy.

## 2018-12-02 NOTE — SIGNIFICANT EVENT
Patient with tachypnea and sounding wet CXR done pleural edema, BNP  2940 so Lasix 40 mg IV given, held Tube feedings, secretions are thick so Mucomyst ordered and Bipap  Need to have discussion with family regarding goals of care and hospice

## 2018-12-02 NOTE — ASSESSMENT & PLAN NOTE
Stroke risk factor  First noted on EKG post hip replacement surgery, 11/11/18  No anticoagulation initiated  Rate treated with metoprolol  Patient returned to normal sinus rhythm spontaneously  -- EKG on admit showed Afib with RVR with repeat showing the same.  -- On heparin gtt as she also had an NSTEMI  -- Will transition to NOAC after consulting with Cardiology.

## 2018-12-02 NOTE — SUBJECTIVE & OBJECTIVE
Neurologic Chief Complaint:  L ICA terminus occlusion  RSW, RFD, L-gaze, Mute    Subjective:     Interval History: Patient is seen for follow-up neurological assessment and treatment recommendations: Acute onset RSW with aphasia, Imaging showed left carotid artery infarct s/p tPa and thrombectomy.  Currently on heparin gtt for new onset Afib. Will switch to NOAC.    HPI, Past Medical, Family, and Social History remains the same as documented in the initial encounter.     Review of Systems   Unable to perform ROS: Patient nonverbal     Scheduled Meds:   albuterol-ipratropium  3 mL Nebulization Q6H    atorvastatin  40 mg Oral Daily    metoprolol tartrate  50 mg Per NG tube Q6H    sodium chloride 0.9%  3 mL Intravenous Q8H     Continuous Infusions:   heparin (porcine) in D5W 14 Units/kg/hr (12/01/18 1912)     PRN Meds:acetaminophen, dextrose 50%, glucagon (human recombinant), insulin aspart U-100, magnesium oxide, magnesium oxide, ondansetron, potassium chloride 10%, potassium chloride 10%, potassium chloride 10%, potassium, sodium phosphates, potassium, sodium phosphates, potassium, sodium phosphates, sodium chloride 0.9%    Objective:     Vital Signs (Most Recent):  Temp: 98.6 °F (37 °C) (12/01/18 1400)  Pulse: (!) 118 (12/01/18 1900)  Resp: (!) 28 (12/01/18 1805)  BP: (!) 163/119 (12/01/18 1500)  SpO2: 98 % (12/01/18 1805)  BP Location: Right arm    Vital Signs Range (Last 24H):  Temp:  [97.8 °F (36.6 °C)-98.9 °F (37.2 °C)]   Pulse:  []   Resp:  [20-42]   BP: (108-163)/()   SpO2:  [98 %-100 %]   BP Location: Right arm    Physical Exam   Eyes: Pupils are equal, round, and reactive to light.   Cardiovascular: Normal rate.   Pulmonary/Chest:   Tachypnea+   Neurological:   Withdraws to pain.   Nursing note and vitals reviewed.      Neurological Exam:   LOC: Nonverbal, obtunded  Language: Global aphasia  Visual Fields: Full  EOM (CN III, IV, VI): Gaze preference  left  Pupils (CN II, III): PERRL  Motor:  Unable to assess strength as patient is non verbal and not following commands. However she seem weaker on the right than the left.    Laboratory:  BMP:   Recent Labs   Lab 12/01/18 0246      K 3.8      CO2 24   BUN 40*   CREATININE 0.8   CALCIUM 8.5*     CBC:   Recent Labs   Lab 12/01/18 0246   WBC 16.65*   RBC 3.61*   HGB 10.8*   HCT 34.1*   *   MCV 95   MCH 29.9   MCHC 31.7*     Coagulation:   Recent Labs   Lab 12/01/18 0246   INR 1.2   APTT 51.5*     Hgb A1C:   Recent Labs   Lab 11/28/18  0100   HGBA1C 5.0     TSH:   Recent Labs   Lab 11/28/18  0100   TSH 0.834       Diagnostic Results     Brain imaging:  CT Head. Date: 11/30/18 @0300  Continued temporal evolution of left MCA territory distribution acute infarct.  No evidence of midline shift or new large parenchymal hemorrhage.        MRI brain 11/28/18:  -Moderate size regions of signal abnormality left MCA territory compatible with recent infarction.   -Please note small component of hemorrhage within the left mesial temporal component of infarction.  -In addition there is superimposed small region of recent infarction in the right inferior frontal lobe.  -Generalized cerebral volume loss with scattered T2 FLAIR signal hyperintensity supratentorial white matter and lilly concerning for underlying chronic ischemic change.  -No evidence for hydrocephalus with left middle cranial fossa probable rack node cyst        CT Head. Date: 11/27/18  No CT evidence of acute intracranial abnormality.     Vessel Imaging:  CTA Multiphase. Date: 11/27/18  Partial opacification of the left cervical ICA, without meaningful opacification of the ICA distally or intracranially, findings suggests thrombus, either within the vessel itself, or the carotid terminus.  There is some flow within the distal M2 and M3 branches on the left, with improved flow on phase 2 and face 3 however no meaningful flow is identified within the M1 segment on the left.  These findings  likely correlate with vague hypoattenuation seen within the region of the left basal ganglia and partially left caudate/subinsular region.    No additional regions of high-grade stenosis or occlusion, please see above for full details.        Cardiac Evaluation:   TTE 11/28/18:  · Severe left atrial enlargement.  · Mildly decreased left ventricular systolic function. The estimated ejection fraction is 45%  · Septal wall has abnormal motion.  · Left ventricular diastolic dysfunction.  · Moderate-to-severe mitral regurgitation.  · Moderate to severe tricuspid regurgitation.  · The estimated PA systolic pressure is 45.77 mm Hg  · Normal central venous pressure (3 mm Hg).

## 2018-12-02 NOTE — SUBJECTIVE & OBJECTIVE
Past Medical History:   Diagnosis Date    Atrial fibrillation with RVR 11/28/2018    Depression     Hypertension     Neuropathy        Past Surgical History:   Procedure Laterality Date    APPENDECTOMY      ARTHROPLASTY-HIP-NOLBERTO Right 11/10/2018    Performed by Nasir Danielle MD at [facility] OR    GALLBLADDER SURGERY      HEMIARTHROPLASTY OF HIP Right 11/10/2018    Procedure: ARTHROPLASTY-HIP-NOLBERTO;  Surgeon: Nasir Danielle MD;  Location: [facility] OR;  Service: Orthopedics;  Laterality: Right;  Depuy Bipolar  LAteral positioner with positioners    HYSTERECTOMY      TONSILLECTOMY         Review of patient's allergies indicates:   Allergen Reactions    Celebrex [celecoxib] Other (See Comments)     Blood in bowel    Cymbalta [duloxetine]     Iodine and iodide containing products     Lovastatin     Pneumococcal 23-gregorio ps vaccine      Per chart    Sulpho-lac [sulfur]     Vioxx [rofecoxib]     Savella [milnacipran] Rash       No current facility-administered medications on file prior to encounter.      Current Outpatient Medications on File Prior to Encounter   Medication Sig    amitriptyline (ELAVIL) 10 MG tablet Take 1 tablet (10 mg total) by mouth 2 (two) times daily.    calcium carb/vit D3/minerals (CALCIUM CARBONATE-VIT D3-MIN) 600 mg (1,500 mg)-400 unit Chew Take 1 tablet by mouth once daily.    cyanocobalamin (VITAMIN B-12) 250 MCG tablet Take 250 mcg by mouth once daily.    ergocalciferol (VITAMIN D2) 50,000 unit Cap Take 50,000 Units by mouth every 7 days.    gabapentin (NEURONTIN) 300 MG capsule Take 1 capsule (300 mg total) by mouth every evening.    guaifenesin-codeine 100-10 mg/5 ml (TUSSI-ORGANIDIN NR)  mg/5 mL syrup Take 10 mLs by mouth every 4 (four) hours as needed for Cough.    HYDROcodone-acetaminophen (NORCO)  mg per tablet Take 1 tablet by mouth every 6 (six) hours as needed for Pain.    verapamil (CALAN-SR) 240 MG CR tablet Take 1 tablet (240 mg total) by mouth  once daily.    vitamin A 8000 UNIT capsule Take 8,000 Units by mouth once daily.     Family History     None        Tobacco Use    Smoking status: Never Smoker    Smokeless tobacco: Never Used   Substance and Sexual Activity    Alcohol use: No    Drug use: No    Sexual activity: No     Review of Systems   Unable to perform ROS: Acuity of condition     Objective:     Vital Signs (Most Recent):  Temp: 96.3 °F (35.7 °C) (12/02/18 1121)  Pulse: (!) 111 (12/02/18 1237)  Resp: 12 (12/02/18 1237)  BP: 112/79 (12/02/18 0819)  SpO2: 99 % (12/02/18 1237) Vital Signs (24h Range):  Temp:  [96.3 °F (35.7 °C)-98.6 °F (37 °C)] 96.3 °F (35.7 °C)  Pulse:  [] 111  Resp:  [12-36] 12  SpO2:  [97 %-100 %] 99 %  BP: (112-163)/() 112/79     Weight: 63.5 kg (139 lb 15.9 oz)  Body mass index is 28.6 kg/m².    Physical Exam   Constitutional:   Pt non-verbal   HENT:   Head: Normocephalic and atraumatic.   Cardiovascular: Normal heart sounds and intact distal pulses.   Pulmonary/Chest: She has wheezes. She has rales.   Abdominal: Soft. Bowel sounds are normal. She exhibits no mass. There is no tenderness. There is no guarding.   Musculoskeletal: She exhibits no edema.   Neurological:   Pt able to squeeze hands on command. Strength greater L>R    Skin: Skin is warm.   Nursing note and vitals reviewed.      Significant Labs: All pertinent labs within the past 24 hours have been reviewed.    Significant Imaging: I have reviewed and interpreted all pertinent imaging results/findings within the past 24 hours.

## 2018-12-02 NOTE — HOSPITAL COURSE
12/01/2018 On heparin gtt. Neurologically she is fluctuating but is still drowsy and not following commands. She is DNR/DNI. Will need discussion for PEG placement.  Repeat ABG shows pH of 7.50 (7.51 from yesterday) with pCO2 of 32 (down from 36) consistent with resp alkalosis. No evidence of hypoxia.   12/02/2018 Became tachypneic and tachycardic overnight requiring BiPAP. Given Lasix 40mg x1. Tube feeds held. Hospital medicine consulted. Started on ASA along with continued heparin gtt.  12/03/2018 Continues to be tachypneic with a RR of 35. On Lasix 40mg qd. Continues to be on heparin and ASA.                      Family progressing towards goals of care and hospice. NG tube removed. Received Morphine 2mg for increased work of breathing.                      Palliative care and hospice consulted.  12/04/2018 Comfort measure in place. Vitals unsteady overnight. Currently in Afib with RVR                     Discharged home with hospice.

## 2018-12-03 PROBLEM — J96.01 ACUTE RESPIRATORY FAILURE WITH HYPOXIA: Status: ACTIVE | Noted: 2018-12-03

## 2018-12-03 PROBLEM — Z51.5 PALLIATIVE CARE ENCOUNTER: Status: ACTIVE | Noted: 2018-12-03

## 2018-12-03 LAB
ALBUMIN SERPL BCP-MCNC: 2.7 G/DL
ALP SERPL-CCNC: 152 U/L
ALT SERPL W/O P-5'-P-CCNC: 144 U/L
ANION GAP SERPL CALC-SCNC: 15 MMOL/L
ANISOCYTOSIS BLD QL SMEAR: SLIGHT
APTT BLDCRRT: 53.8 SEC
AST SERPL-CCNC: 194 U/L
BASOPHILS # BLD AUTO: 0.02 K/UL
BASOPHILS NFR BLD: 0.1 %
BILIRUB SERPL-MCNC: 0.8 MG/DL
BUN SERPL-MCNC: 61 MG/DL
CALCIUM SERPL-MCNC: 8.9 MG/DL
CHLORIDE SERPL-SCNC: 108 MMOL/L
CO2 SERPL-SCNC: 23 MMOL/L
CREAT SERPL-MCNC: 1.1 MG/DL
DIFFERENTIAL METHOD: ABNORMAL
EOSINOPHIL # BLD AUTO: 0 K/UL
EOSINOPHIL NFR BLD: 0 %
ERYTHROCYTE [DISTWIDTH] IN BLOOD BY AUTOMATED COUNT: 14.5 %
EST. GFR  (AFRICAN AMERICAN): 51.4 ML/MIN/1.73 M^2
EST. GFR  (NON AFRICAN AMERICAN): 44.6 ML/MIN/1.73 M^2
GLUCOSE SERPL-MCNC: 101 MG/DL
HCT VFR BLD AUTO: 40.6 %
HGB BLD-MCNC: 12.3 G/DL
HYPOCHROMIA BLD QL SMEAR: ABNORMAL
IMM GRANULOCYTES # BLD AUTO: 0.12 K/UL
IMM GRANULOCYTES NFR BLD AUTO: 0.7 %
INR PPP: 1.5
LYMPHOCYTES # BLD AUTO: 1.6 K/UL
LYMPHOCYTES NFR BLD: 8.7 %
MAGNESIUM SERPL-MCNC: 3 MG/DL
MCH RBC QN AUTO: 28.9 PG
MCHC RBC AUTO-ENTMCNC: 30.3 G/DL
MCV RBC AUTO: 95 FL
MONOCYTES # BLD AUTO: 2 K/UL
MONOCYTES NFR BLD: 11.2 %
NEUTROPHILS # BLD AUTO: 14.3 K/UL
NEUTROPHILS NFR BLD: 79.3 %
NRBC BLD-RTO: 1 /100 WBC
OVALOCYTES BLD QL SMEAR: ABNORMAL
PHOSPHATE SERPL-MCNC: 5.3 MG/DL
PLATELET # BLD AUTO: 443 K/UL
PMV BLD AUTO: 11.6 FL
POCT GLUCOSE: 117 MG/DL (ref 70–110)
POCT GLUCOSE: 122 MG/DL (ref 70–110)
POIKILOCYTOSIS BLD QL SMEAR: SLIGHT
POLYCHROMASIA BLD QL SMEAR: ABNORMAL
POTASSIUM SERPL-SCNC: 4.2 MMOL/L
PROT SERPL-MCNC: 6.2 G/DL
PROTHROMBIN TIME: 14.6 SEC
RBC # BLD AUTO: 4.26 M/UL
SODIUM SERPL-SCNC: 146 MMOL/L
WBC # BLD AUTO: 17.97 K/UL

## 2018-12-03 PROCEDURE — 25000242 PHARM REV CODE 250 ALT 637 W/ HCPCS: Performed by: NURSE PRACTITIONER

## 2018-12-03 PROCEDURE — 99900035 HC TECH TIME PER 15 MIN (STAT)

## 2018-12-03 PROCEDURE — 31720 CLEARANCE OF AIRWAYS: CPT

## 2018-12-03 PROCEDURE — A4216 STERILE WATER/SALINE, 10 ML: HCPCS | Performed by: NURSE PRACTITIONER

## 2018-12-03 PROCEDURE — 25000003 PHARM REV CODE 250: Performed by: NURSE PRACTITIONER

## 2018-12-03 PROCEDURE — 94668 MNPJ CHEST WALL SBSQ: CPT

## 2018-12-03 PROCEDURE — 97110 THERAPEUTIC EXERCISES: CPT

## 2018-12-03 PROCEDURE — 20600001 HC STEP DOWN PRIVATE ROOM

## 2018-12-03 PROCEDURE — 94761 N-INVAS EAR/PLS OXIMETRY MLT: CPT

## 2018-12-03 PROCEDURE — 25000003 PHARM REV CODE 250: Performed by: STUDENT IN AN ORGANIZED HEALTH CARE EDUCATION/TRAINING PROGRAM

## 2018-12-03 PROCEDURE — 94640 AIRWAY INHALATION TREATMENT: CPT

## 2018-12-03 PROCEDURE — 27000221 HC OXYGEN, UP TO 24 HOURS

## 2018-12-03 PROCEDURE — 84100 ASSAY OF PHOSPHORUS: CPT

## 2018-12-03 PROCEDURE — 63600175 PHARM REV CODE 636 W HCPCS: Performed by: STUDENT IN AN ORGANIZED HEALTH CARE EDUCATION/TRAINING PROGRAM

## 2018-12-03 PROCEDURE — 97530 THERAPEUTIC ACTIVITIES: CPT

## 2018-12-03 PROCEDURE — 63600175 PHARM REV CODE 636 W HCPCS: Performed by: NURSE PRACTITIONER

## 2018-12-03 PROCEDURE — 94660 CPAP INITIATION&MGMT: CPT

## 2018-12-03 PROCEDURE — 85610 PROTHROMBIN TIME: CPT

## 2018-12-03 PROCEDURE — 83735 ASSAY OF MAGNESIUM: CPT

## 2018-12-03 PROCEDURE — 85025 COMPLETE CBC W/AUTO DIFF WBC: CPT

## 2018-12-03 PROCEDURE — 99233 SBSQ HOSP IP/OBS HIGH 50: CPT | Mod: ,,, | Performed by: PSYCHIATRY & NEUROLOGY

## 2018-12-03 PROCEDURE — 99222 1ST HOSP IP/OBS MODERATE 55: CPT | Mod: ,,, | Performed by: INTERNAL MEDICINE

## 2018-12-03 PROCEDURE — 99232 SBSQ HOSP IP/OBS MODERATE 35: CPT | Mod: ,,, | Performed by: HOSPITALIST

## 2018-12-03 PROCEDURE — 85730 THROMBOPLASTIN TIME PARTIAL: CPT

## 2018-12-03 PROCEDURE — 80053 COMPREHEN METABOLIC PANEL: CPT

## 2018-12-03 RX ORDER — METOPROLOL TARTRATE 1 MG/ML
5 INJECTION, SOLUTION INTRAVENOUS ONCE
Status: COMPLETED | OUTPATIENT
Start: 2018-12-03 | End: 2018-12-03

## 2018-12-03 RX ORDER — IPRATROPIUM BROMIDE AND ALBUTEROL SULFATE 2.5; .5 MG/3ML; MG/3ML
3 SOLUTION RESPIRATORY (INHALATION)
Qty: 1 BOX | Refills: 0 | Status: SHIPPED | OUTPATIENT
Start: 2018-12-03 | End: 2018-12-03 | Stop reason: HOSPADM

## 2018-12-03 RX ORDER — MORPHINE SULFATE 4 MG/ML
2 INJECTION, SOLUTION INTRAMUSCULAR; INTRAVENOUS
Status: DISCONTINUED | OUTPATIENT
Start: 2018-12-03 | End: 2018-12-04 | Stop reason: HOSPADM

## 2018-12-03 RX ORDER — SCOLOPAMINE TRANSDERMAL SYSTEM 1 MG/1
1 PATCH, EXTENDED RELEASE TRANSDERMAL
Status: DISCONTINUED | OUTPATIENT
Start: 2018-12-03 | End: 2018-12-04 | Stop reason: HOSPADM

## 2018-12-03 RX ORDER — MORPHINE SULFATE 4 MG/ML
2 INJECTION, SOLUTION INTRAMUSCULAR; INTRAVENOUS ONCE
Status: COMPLETED | OUTPATIENT
Start: 2018-12-03 | End: 2018-12-03

## 2018-12-03 RX ORDER — LORAZEPAM 2 MG/ML
1 INJECTION INTRAMUSCULAR EVERY 30 MIN PRN
Status: DISCONTINUED | OUTPATIENT
Start: 2018-12-03 | End: 2018-12-04 | Stop reason: HOSPADM

## 2018-12-03 RX ADMIN — METOPROLOL TARTRATE 50 MG: 50 TABLET ORAL at 02:12

## 2018-12-03 RX ADMIN — MORPHINE SULFATE 2 MG: 4 INJECTION, SOLUTION INTRAMUSCULAR; INTRAVENOUS at 12:12

## 2018-12-03 RX ADMIN — ACETYLCYSTEINE 4 ML: 100 INHALANT RESPIRATORY (INHALATION) at 01:12

## 2018-12-03 RX ADMIN — LORAZEPAM 1 MG: 2 INJECTION INTRAMUSCULAR; INTRAVENOUS at 04:12

## 2018-12-03 RX ADMIN — FUROSEMIDE 40 MG: 10 INJECTION, SOLUTION INTRAMUSCULAR; INTRAVENOUS at 09:12

## 2018-12-03 RX ADMIN — IPRATROPIUM BROMIDE AND ALBUTEROL SULFATE 3 ML: .5; 3 SOLUTION RESPIRATORY (INHALATION) at 08:12

## 2018-12-03 RX ADMIN — Medication 3 ML: at 10:12

## 2018-12-03 RX ADMIN — MORPHINE SULFATE 2 MG: 4 INJECTION, SOLUTION INTRAMUSCULAR; INTRAVENOUS at 06:12

## 2018-12-03 RX ADMIN — IPRATROPIUM BROMIDE AND ALBUTEROL SULFATE 3 ML: .5; 3 SOLUTION RESPIRATORY (INHALATION) at 01:12

## 2018-12-03 RX ADMIN — SCOPALAMINE 1 PATCH: 1 PATCH, EXTENDED RELEASE TRANSDERMAL at 04:12

## 2018-12-03 RX ADMIN — METOPROLOL TARTRATE 5 MG: 1 INJECTION, SOLUTION INTRAVENOUS at 12:12

## 2018-12-03 RX ADMIN — METOPROLOL TARTRATE 50 MG: 50 TABLET ORAL at 09:12

## 2018-12-03 RX ADMIN — ACETYLCYSTEINE 4 ML: 100 INHALANT RESPIRATORY (INHALATION) at 08:12

## 2018-12-03 RX ADMIN — IPRATROPIUM BROMIDE AND ALBUTEROL SULFATE 3 ML: .5; 3 SOLUTION RESPIRATORY (INHALATION) at 07:12

## 2018-12-03 RX ADMIN — HEPARIN SODIUM AND DEXTROSE 14 UNITS/KG/HR: 10000; 5 INJECTION INTRAVENOUS at 04:12

## 2018-12-03 RX ADMIN — LISINOPRIL 2.5 MG: 2.5 TABLET ORAL at 09:12

## 2018-12-03 RX ADMIN — LORAZEPAM 1 MG: 2 INJECTION INTRAMUSCULAR; INTRAVENOUS at 05:12

## 2018-12-03 RX ADMIN — ACETYLCYSTEINE 4 ML: 100 INHALANT RESPIRATORY (INHALATION) at 07:12

## 2018-12-03 RX ADMIN — IPRATROPIUM BROMIDE AND ALBUTEROL SULFATE 3 ML: .5; 3 SOLUTION RESPIRATORY (INHALATION) at 12:12

## 2018-12-03 RX ADMIN — Medication 3 ML: at 02:12

## 2018-12-03 NOTE — PROGRESS NOTES
IM resident on unit, notified him of pt's irregular HR varying from 90s-160s on tele. Stated that he would address it with his team along with primary regarding adjusting medication. Will continue to monitor.

## 2018-12-03 NOTE — SUBJECTIVE & OBJECTIVE
Neurologic Chief Complaint: RSW with aphasia.    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 S/p thrombectomy on 11/27.  Progressing towards comfort care and hospice. Palliative care consulted.  Tachypneic (RR 35) with increased work of breathing.    HPI, Past Medical, Family, and Social History remains the same as documented in the initial encounter.     Review of Systems   Unable to perform ROS: Patient nonverbal     Scheduled Meds:   acetylcysteine 100 mg/ml (10%)  4 mL Nebulization TID WAKE    albuterol-ipratropium  3 mL Nebulization Q6H    aspirin  81 mg Oral Daily    atorvastatin  40 mg Oral Daily    lisinopril  2.5 mg Oral Daily    sodium chloride 0.9%  3 mL Intravenous Q8H     Continuous Infusions:   heparin (porcine) in D5W 14 Units/kg/hr (12/03/18 0407)     PRN Meds:acetaminophen, albuterol-ipratropium, dextrose 50%, glucagon (human recombinant), insulin aspart U-100, ondansetron, sodium chloride 0.9%    Objective:     Vital Signs (Most Recent):  Temp: (!) 95.6 °F (35.3 °C) (12/03/18 1527)  Pulse: (!) 117 (12/03/18 1527)  Resp: 16 (12/03/18 1527)  BP: 130/67 (12/03/18 1527)  SpO2: (!) 92 % (12/03/18 1527)  BP Location: Left arm    Vital Signs Range (Last 24H):  Temp:  [95.6 °F (35.3 °C)-98.8 °F (37.1 °C)]   Pulse:  []   Resp:  [16-24]   BP: (130-170)/(65-94)   SpO2:  [92 %-98 %]   BP Location: Left arm    Physical Exam   Eyes:   R anisocoria   Cardiovascular: An irregularly irregular rhythm present. Tachycardia present.   Pulmonary/Chest: Accessory muscle usage present. Tachypnea noted.   Abdominal: Soft.   Nursing note and vitals reviewed.      Neurological Exam:   LOC: drowsy and Inconsistently follows commands. Squeezes finger with left hand but does not follow any other commands.  Language: Global aphasia  Pupils (CN II, III): Anisocoria Side: R pupil approx  5mm.    Laboratory:  BMP:   Recent Labs   Lab 12/03/18  0543   *   K 4.2      CO2 23   BUN 61*   CREATININE 1.1   CALCIUM 8.9     CBC:   Recent Labs   Lab 12/03/18  0543   WBC 17.97*   RBC 4.26   HGB 12.3   HCT 40.6   *   MCV 95   MCH 28.9   MCHC 30.3*     Lipid Panel:   Recent Labs   Lab 11/28/18  0100   CHOL 147   LDLCALC 83.2   HDL 46   TRIG 89     Coagulation:   Recent Labs   Lab 12/03/18  0543   INR 1.5*   APTT 53.8*     Platelet Aggregation Study: No results for input(s): PLTAGG, PLTAGINTERP, PLTAGREGLACO, ADPPLTAGGREG in the last 168 hours.  Hgb A1C:   Recent Labs   Lab 11/28/18 0100   HGBA1C 5.0     TSH:   Recent Labs   Lab 11/28/18 0100   TSH 0.834       Diagnostic Results     Brain imaging:  CT Head. Date: 11/30/18 @0300  Continued temporal evolution of left MCA territory distribution acute infarct.  No evidence of midline shift or new large parenchymal hemorrhage.        MRI brain 11/28/18:  -Moderate size regions of signal abnormality left MCA territory compatible with recent infarction.   -Please note small component of hemorrhage within the left mesial temporal component of infarction.  -In addition there is superimposed small region of recent infarction in the right inferior frontal lobe.  -Generalized cerebral volume loss with scattered T2 FLAIR signal hyperintensity supratentorial white matter and lilly concerning for underlying chronic ischemic change.  -No evidence for hydrocephalus with left middle cranial fossa probable rack node cyst         CT Head. Date: 11/27/18  No CT evidence of acute intracranial abnormality.     Vessel Imaging:  CTA Multiphase. Date: 11/27/18  Partial opacification of the left cervical ICA, without meaningful opacification of the ICA distally or intracranially, findings suggests thrombus, either within the vessel itself, or the carotid terminus.  There is some flow within the distal M2 and M3 branches on the left, with improved flow on phase 2 and  face 3 however no meaningful flow is identified within the M1 segment on the left.  These findings likely correlate with vague hypoattenuation seen within the region of the left basal ganglia and partially left caudate/subinsular region.    No additional regions of high-grade stenosis or occlusion, please see above for full details.        Cardiac Evaluation:   TTE 11/28/18:  · Severe left atrial enlargement.  · Mildly decreased left ventricular systolic function. The estimated ejection fraction is 45%  · Septal wall has abnormal motion.  · Left ventricular diastolic dysfunction.  · Moderate-to-severe mitral regurgitation.  · Moderate to severe tricuspid regurgitation.  · The estimated PA systolic pressure is 45.77 mm Hg  · Normal central venous pressure (3 mm Hg).

## 2018-12-03 NOTE — ASSESSMENT & PLAN NOTE
Pt presents w/ new onset evidence suggesting CHF. Recent echo (11/28) reveals EF 45%, decreased from 60% (11/12). Crackles and productive cough present on physical exam. Pt's family denies history of CHF.    - BNP 2940 (12/1)  - CXR shows evidence of congestion and bilateral airspace opacities suggestive of pulmonary edema  - TTE 11/29 shows EF 45% with diastolic dysfunction     Recommendations:  - Hold evening lasix given JENA. Can an additional dose for palliative purposes to reduce help resp distress  - Continue ACEi 2.5mg daily   - Plan to transfer patient to inpatient hospice

## 2018-12-03 NOTE — HPI
Ms. 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 The patient initially presented to [facility] with severe right sided weakness and aphasia with a NIH stroke scale of 22. Tpa end time was 1642. She also had NSTEMI.  She continues to decline. We have been asked to assist with goals of care and placement.

## 2018-12-03 NOTE — CONSULTS
"[facility]  Palliative Medicine  Consult Note    Patient Name: Apple Watson  MRN: [id number]  Admission Date: 11/27/2018  Hospital Length of Stay: 6 days  Code Status: DNR   Attending Provider: Heather García MD  Consulting Provider: Afsaneh Randle MD  Primary Care Physician: Tree Rolon MD  Principal Problem:Embolic stroke involving left carotid artery    Patient information was obtained from relative(s) and past medical records.      Consults  Assessment/Plan:     Palliative care encounter    Patient has had a stroke and nstemi. Her family would like her goals of care to be comfort.  Have consulted [facility] for inpatient hospice bed.  Son has "signed papers". Awaiting disposition.  She is much more comfortable since receiving Morphine 2 mg. Would use 2 mg every 2 hours as needed for shortness of breath or chest pain.  May need lorazepam for agitation and robinul for secretions.  Would discontinue heparin upon transfer to hospice along with other non comfort focused treatments.         Thank you for your consult. I will follow-up with patient. Please contact us if you have any additional questions.    Subjective:     HPI:   Ms. 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 The patient initially presented to [facility] with severe right sided weakness and aphasia with a NIH stroke scale of 22. Tpa end time was 1642.  She also had NSTEMI.  She continues to decline. We have been asked to assist with goals of care and placement.        Hospital Course:  No notes on file    Interval History: much more comfortable after receiving morphine.    Past Medical History:   Diagnosis Date    Atrial fibrillation with RVR 11/28/2018    Depression     Hypertension     Neuropathy        Past Surgical History:   Procedure Laterality Date    APPENDECTOMY      " ARTHROPLASTY-HIP-NOLBERTO Right 11/10/2018    Performed by [facility]ir [facility]le MD at [facility] OR    GALLBLADDER SURGERY      HEMIARTHROPLASTY OF HIP Right 11/10/2018    Procedure: ARTHROPLASTY-HIP-NOLBERTO;  Surgeon: Nasir Danielle MD;  Location: [facility] OR;  Service: Orthopedics;  Laterality: Right;  Depuy Bipolar  LAteral positioner with positioners    HYSTERECTOMY      TONSILLECTOMY         Review of patient's allergies indicates:   Allergen Reactions    Celebrex [celecoxib] Other (See Comments)     Blood in bowel    Cymbalta [duloxetine]     Iodine and iodide containing products     Lovastatin     Pneumococcal 23-gregorio ps vaccine      Per chart    Sulpho-lac [sulfur]     Vioxx [rofecoxib]     Savella [milnacipran] Rash       Medications:  Continuous Infusions:   heparin (porcine) in D5W 14 Units/kg/hr (12/03/18 0407)     Scheduled Meds:   acetylcysteine 100 mg/ml (10%)  4 mL Nebulization TID WAKE    albuterol-ipratropium  3 mL Nebulization Q6H    aspirin  81 mg Oral Daily    atorvastatin  40 mg Oral Daily    lisinopril  2.5 mg Oral Daily    sodium chloride 0.9%  3 mL Intravenous Q8H     PRN Meds:acetaminophen, albuterol-ipratropium, dextrose 50%, glucagon (human recombinant), insulin aspart U-100, ondansetron, sodium chloride 0.9%    Family History     None        Tobacco Use    Smoking status: Never Smoker    Smokeless tobacco: Never Used   Substance and Sexual Activity    Alcohol use: No    Drug use: No    Sexual activity: No       Review of Systems   Unable to perform ROS: Patient nonverbal     Objective:     Vital Signs (Most Recent):  Temp: (!) 95.6 °F (35.3 °C) (12/03/18 1527)  Pulse: (!) 117 (12/03/18 1527)  Resp: 16 (12/03/18 1527)  BP: 130/67 (12/03/18 1527)  SpO2: (!) 92 % (12/03/18 1527) Vital Signs (24h Range):  Temp:  [95.6 °F (35.3 °C)-98.8 °F (37.1 °C)] 95.6 °F (35.3 °C)  Pulse:  [] 117  Resp:  [16-24] 16  SpO2:  [92 %-98 %] 92 %  BP: (130-170)/(65-94) 130/67     Weight: 63.5 kg  (139 lb 15.9 oz)  Body mass index is 28.6 kg/m².    Review of Symptoms  Symptom Assessment (ESAS 0-10 scale)   ESAS 0 1 2 3 4 5 6 7 8 9 10   Pain x             Dyspnea x             Anxiety x             Nausea x             Depression  x             Anorexia x             Fatigue x             Insomnia x             Restlessness  x             Agitation x             CAM / Delirium x__ --  ___+   Constipation     _x_ --  ___+   Diarrhea           x__ --  ___+  Bowel Management Plan (BMP): No    Comments: no oral intake    Pain Assessment: none  OME in 24 hours: 2 mg IV    Performance Status: 10    ECOG Performance Status Grade: 4 - Completely disabled    Physical Exam   Nursing note and vitals reviewed.      Significant Labs: All pertinent labs within the past 24 hours have been reviewed.  CBC:   Recent Labs   Lab 12/03/18  0543   WBC 17.97*   HGB 12.3   HCT 40.6   MCV 95   *     BMP:  Recent Labs   Lab 12/03/18  0543      *   K 4.2      CO2 23   BUN 61*   CREATININE 1.1   CALCIUM 8.9   MG 3.0*     LFT:  Lab Results   Component Value Date     (H) 12/03/2018    ALKPHOS 152 (H) 12/03/2018    BILITOT 0.8 12/03/2018     Albumin:   Albumin   Date Value Ref Range Status   12/03/2018 2.7 (L) 3.5 - 5.2 g/dL Final     Protein:   Total Protein   Date Value Ref Range Status   12/03/2018 6.2 6.0 - 8.4 g/dL Final     Lactic acid:   No results found for: LACTATE    Significant Imaging: I have reviewed all pertinent imaging results/findings within the past 24 hours.    Advanced Directives::  Living Will: No  LaPOST: No  Do Not Resuscitate Status: Yes  Medical Power of : No  Son in the room. He and siblings want to provide comfort focused treatments.  Decision-Making Capacity: Family answered questions, Patient unable to communicate due to disease severity/cognitive impairment    Living Arrangements: Lives in home          > 50% of 45 min visit spent in chart review, face to face discussion  of goals of care,  symptom assessment, coordination of care and emotional support.    Afsaneh Randle MD  Palliative Medicine  [facility]  [phone]

## 2018-12-03 NOTE — PT/OT/SLP PROGRESS
Occupational Therapy   Treatment    Name: Apple Watson  MRN: [id number]  Admitting Diagnosis:  Embolic stroke involving left carotid artery       Recommendations:     Discharge Recommendations: (SNF vs home with 24 hour (A)/hospice pending family decision)  Discharge Equipment Recommendations:  (TBD)  Barriers to discharge:  None    Subjective   Pt's daughter reported that the family were trying to decide what was needed.  Pain/Comfort:  · Pain Rating 1: 0/10  · Pain Rating Post-Intervention 1: 0/10(no indication of pain during session)    Objective:     Communicated with: RN prior to session.  Patient found supine in bed with daughter present in room and telemetry, oxygen, NG tube, SCD, pressure relief boots upon OT entry to room.    General Precautions: Standard, aspiration, fall, NPO(DNR)   Orthopedic Precautions:RLE weight bearing as tolerated     Occupational Performance:    Bed Mobility:    · Patient completed Rolling/Turning to Left with  dependent  · Patient completed Scooting/Bridging with dependent drawsheet transfer up [facility] while supine    [facility] 6 Click ADL: 6    Treatment & Education:  Provided PROM to BUE & BLE in all available planes x 10 reps each while supine.  Provided repositioning of pt in bed onto sidelying per daughter request.  Discussed POC & discharge recommendations with pt's daughter.  Answered questions from daughter & provided therapeutic listening of daughter within OT scope of practic during session.  Recommended to pt's daughter that family talk with MD team regarding POC & discharge concerns.  RN arrived at room after repositioning reporting pt with episode of elevated HR during session.  Pt's daughter had no further questions & when asked whether there were any concerns pt's daughter reported none.      Patient left left sidelying with all lines intact, call button in reach, bed alarm on, RN notified, daughter present and white board updated.  Education:    Assessment:     Apple  Walter is a 89 y.o. female with a medical diagnosis of Embolic stroke involving left carotid artery.  She presents with the following performance deficits affecting function are weakness, impaired endurance, impaired sensation, impaired self care skills, impaired functional mobilty, impaired balance, visual deficits, decreased coordination, impaired cognition, decreased upper extremity function, decreased lower extremity function, decreased safety awareness, abnormal tone, decreased ROM, impaired fine motor, impaired coordination, impaired cardiopulmonary response to activity, impaired skin.  Limited session on this date due to pt status.    Rehab Prognosis:  Limited currently; patient would benefit from acute skilled OT services to address these deficits and reach maximum level of function.       Plan:     Patient to be seen 3 x/week to address the above listed problems via self-care/home management, therapeutic activities, neuromuscular re-education, therapeutic exercises, cognitive retraining, sensory integration  · Plan of Care Expires: 12/28/18  · Plan of Care Reviewed with: patient, daughter    This Plan of care has been discussed with the patient who was involved in its development and understands and is in agreement with the identified goals and treatment plan    GOALS:   Multidisciplinary Problems     Occupational Therapy Goals        Problem: Occupational Therapy Goal    Goal Priority Disciplines Outcome Interventions   Occupational Therapy Goal     OT, PT/OT Ongoing (interventions implemented as appropriate)    Description:  Goals to be met by: 12/7     Patient will increase functional independence with ADLs by performing:    UE Dressing with Maximum Assistance.  LE Dressing with Maximum Assistance.  Grooming while seated with Moderate Assistance.  Toileting from bedside commode with Maximum Assistance for hygiene and clothing management.   Sitting at edge of bed x15 minutes with Moderate  Assistance.  Rolling to Bilateral with Moderate Assistance.   Supine to sit with Moderate Assistance.  Stand pivot transfers with Maximum Assistance.  Pt will follow 3/4 one step commands.  Pt will maintain eyes open 75% of session.                      Time Tracking:     OT Date of Treatment: 12/03/18  OT Start Time: 0828  OT Stop Time: 0906  OT Total Time (min): 38 min    Billable Minutes:Therapeutic Activity 15  Therapeutic Exercise 20    RACHAEL Otoole  12/3/2018

## 2018-12-03 NOTE — PLAN OF CARE
Problem: Patient Care Overview  Goal: Plan of Care Review  Outcome: Ongoing (interventions implemented as appropriate)  POC reviewed with patient and daughter. All questions and concerns reviewed with daughter. Patient requires reinforcement.15 beats of v-tach noted. Magnesium 2g/50ml administered. VSS following interventions. Fall/safety precautions implemented and maintained. Purewick care provided. Blood glucose monitored. Continuous heparin maintained at 14u/kg/hr. Next lab draw at 0400. Awaiting results. Will continue to monitor.

## 2018-12-03 NOTE — PROGRESS NOTES
[facility]  Vascular Neurology  Comprehensive Stroke Center  Progress Note    Assessment/Plan:     * Embolic stroke involving left carotid artery    Apple Watson is a 89 y.o. female with PMHx of HTN and new onset a fib who presented to OSH with L MCA syndrome. She was treated with tPA and transferred to [facility]. CTA with L ICA occlusion, patient taken to IR for possible intervention.     11/29/18: Patient doing much better, able to give thumbs up with both hands, and nodding head appropriately.   11/30/18: Patient not following commands like she was yesterday. More obtunded and lethargic.   11/30 @0300 CT head showed stable, evolving infarct.    Antithrombotics for secondary stroke prevention:  Continue Heparin gtt. ASA discontinued as she is being transitioned to comfort care.      Statins for secondary stroke prevention and hyperlipidemia, if present:   Statins: None: Reason: Comfort care    Aggressive risk factor modification: HTN, A-Fib     Rehab efforts: Occupational Therapy, PT/OT/SLP to evaluate and treat, PM&R consult     Diagnostics ordered/pending: None    VTE prophylaxis: None: Reason for No Pharmacological VTE Prophylaxis: Mechanical prophylaxis: Place SCDs    BP parameters: Infarct: Post tPA, SBP <180         Palliative care encounter    - Palliative care consulted, appreciate recs.  - Discussed with the family regarding hospice. After a prolonged discussion, they opted for comfort care and transfer to hospice.   - Comfort care orders placed.  - Patient to be transferred to [facility] on 12/4.       Atrial fibrillation with RVR    First noted on EKG post hip replacement surgery, 11/11/18, No anticoagulation was initiated at that time.   A fib noted on monitor on arrival in ED, EKG obtained confirmed afib RVR  Will need to discuss secondary stroke prevention with patient and family    Atrial Fibrillation Assessment ([id number]):       Condition Points    C   Congestive heart  failure (or Left ventricular systolic dysfunction) 0    H Hypertension: blood pressure consistently above 140/90 mmHg (or treated hypertension on medication) 1    A2  Age ?75 years 2    D  Diabetes Mellitus 0    S2  Prior Stroke or TIA or Thromboembolism  2    V  Vascular disease (e.g. peripheral artery disease, myocardial infarction, aortic plaque) ?    A  Age 65-74 years 0    Sc  Sex category (i.e. female sex) 1                                                                                  [id number] Score: 6  [id number] Score Stroke Risk %   6 9.8                                                                                  Annual Stroke Risk:  9.8%    Score Risk Anticoagulation Therapy Considerations   2 or greater High Oral anticoagulant Oral anticoagulant, using either a new oral anticoagulant drug   (apixaban, rivaroxaban or dabigatran) or well controlled Warfarin at INR 2.0-3.0             Recommendation:     -[id number] score of 6= 9.% risk.    -If transition to warfarin, would likely need heparin bridge   -Would begin patient on AC prior to discharge, if no other contraindication.    -Rate control with Metoprolol tartrate while inpatient and transition to 24h metoprolol succinate prior to d/c.    -Cardiology following              Received intravenous tissue plasminogen activator (tPA) in emergency department    Admitting to neuro ICU for post tPA monitoring  -- Stepped down on 12/1     Atrial fibrillation    Stroke risk factor  First noted on EKG post hip replacement surgery, 11/11/18  No anticoagulation initiated  Rate treated with metoprolol  Patient returned to normal sinus rhythm spontaneously  -- EKG on admit showed Afib with RVR with repeat showing the same.  -- On heparin gtt as she also had an NSTEMI     NSTEMI (non-ST elevated myocardial infarction)    Trop >50  NSTEMI  -- Cardiology following, appreciate recs.  -- On heparin gtt. Started on ASA 81mg today. Hold on on starting Plavix for  now as high risk of hemorrhagic conversion with DAPT and heparin.     Closed fracture of neck of right femur    Recent surgery on 11/10/18     Essential hypertension    Stroke risk factor  SBP <180 post tPA  BP currently at goal          12/01/2018 On heparin gtt. Neurologically she is fluctuating but is still drowsy and not following commands. She is DNR/DNI. Will need discussion for PEG placement.  Repeat ABG shows pH of 7.50 (7.51 from yesterday) with pCO2 of 32 (down from 36) consistent with resp alkalosis. No evidence of hypoxia.   12/02/2018 Became tachypneic and tachycardic overnight requiring BiPAP. Given Lasix 40mg x1. Tube feeds held. Hospital medicine consulted. Started on ASA along with continued heparin gtt.  12/03/2018 Continues to be tachypneic with a RR of 35. On Lasix 40mg qd. Continues to be on heparin and ASA.                      Family progressing towards goals of care and hospice. NG tube removed. Received Morphine 2mg for increased work of breathing.                      Palliative care and hospice consulted.    STROKE DOCUMENTATION   Acute Stroke Times   Last Known Normal Date: 11/27/18  Last Known Normal Time: 1300  Symptom Onset Date: 11/27/18  Symptom Onset Time: 1330  Stroke Team Called Date: 11/27/18  Stroke Team Called Time: 1649  Stroke Team Arrival Date: 11/27/18  Stroke Team Arrival Time: 1650  CT Interpretation Time: 1718  Decision to Treat Time for Alteplase: (administed at OSH via telemedicine)  Decision to Treat Time for IR: 1732    NIH Scale:  1a. Level Of Consciousness: 2-->Not alert: requires repeated stimulation to attend, or is obtunded and requires strong or painful stimulation to make movements (not stereotyped)  1b. LOC Questions: 2-->Answers neither question correctly  1c. LOC Commands: 2-->Performs neither task correctly  2. Best Gaze: 1-->Partial gaze palsy: gaze is abnormal in one or both eyes, but forced deviation or total gaze paresis is not present  3. Visual:  0-->No visual loss  4. Facial Palsy: 1-->Minor paralysis (flattened nasolabial fold, asymmetry on smiling)  5a. Motor Arm, Left: 2-->Some effort against gravity: limb cannot get to or maintain (if cued) 90 (or 45) degrees, drifts down to bed, but has some effort against gravity  5b. Motor Arm, Right: 3-->No effort against gravity: limb falls  6a. Motor Leg, Left: 3-->No effort against gravity: leg falls to bed immediately  6b. Motor Leg, Right: 4-->No movement  7. Limb Ataxia: 1-->Present in one limb  8. Sensory: 1-->Mild-to-moderate sensory loss: patient feels pinprick is less sharp or is dull on the affected side: or there is a loss of superficial pain with pinprick, but patient is aware of being touched  9. Best Language: 3-->Mute, global aphasia: no usable speech or auditory comprehension  10. Dysarthria: 2-->Severe dysarthria: patients speech is so slurred as to be unintelligible in the absence of or out of proportion to any dysphasia, or is mute/anarthric  11. Extinction and Inattention (formerly Neglect): 0-->No abnormality  Total (NIH Stroke Scale): 27       Modified Cuco Score: 3  [place] Coma Scale:9   ABCD2 Score:    [id number] Score:   HAS -BLED Score:   ICH Score:   Hunt & Tapia Classification:      Hemorrhagic change of an Ischemic Stroke: Does this patient have an ischemic stroke with hemorrhagic changes? No     Neurologic Chief Complaint: RSW with aphasia.    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 S/p thrombectomy on 11/27.  Progressing towards comfort care and hospice. Palliative care consulted.  Tachypneic (RR 35) with increased work of breathing.    HPI, Past Medical, Family, and Social History remains the same as documented in the initial encounter.     Review of Systems   Unable to perform ROS: Patient nonverbal     Scheduled Meds:   acetylcysteine 100 mg/ml (10%)  4  mL Nebulization TID WAKE    albuterol-ipratropium  3 mL Nebulization Q6H    aspirin  81 mg Oral Daily    atorvastatin  40 mg Oral Daily    lisinopril  2.5 mg Oral Daily    sodium chloride 0.9%  3 mL Intravenous Q8H     Continuous Infusions:   heparin (porcine) in D5W 14 Units/kg/hr (12/03/18 0407)     PRN Meds:acetaminophen, albuterol-ipratropium, dextrose 50%, glucagon (human recombinant), insulin aspart U-100, ondansetron, sodium chloride 0.9%    Objective:     Vital Signs (Most Recent):  Temp: (!) 95.6 °F (35.3 °C) (12/03/18 1527)  Pulse: (!) 117 (12/03/18 1527)  Resp: 16 (12/03/18 1527)  BP: 130/67 (12/03/18 1527)  SpO2: (!) 92 % (12/03/18 1527)  BP Location: Left arm    Vital Signs Range (Last 24H):  Temp:  [95.6 °F (35.3 °C)-98.8 °F (37.1 °C)]   Pulse:  []   Resp:  [16-24]   BP: (130-170)/(65-94)   SpO2:  [92 %-98 %]   BP Location: Left arm    Physical Exam   Eyes:   R anisocoria   Cardiovascular: An irregularly irregular rhythm present. Tachycardia present.   Pulmonary/Chest: Accessory muscle usage present. Tachypnea noted.   Abdominal: Soft.   Nursing note and vitals reviewed.      Neurological Exam:   LOC: drowsy and Inconsistently follows commands. Squeezes finger with left hand but does not follow any other commands.  Language: Global aphasia  Pupils (CN II, III): Anisocoria Side: R pupil approx 5mm.    Laboratory:  BMP:   Recent Labs   Lab 12/03/18  0543   *   K 4.2      CO2 23   BUN 61*   CREATININE 1.1   CALCIUM 8.9     CBC:   Recent Labs   Lab 12/03/18  0543   WBC 17.97*   RBC 4.26   HGB 12.3   HCT 40.6   *   MCV 95   MCH 28.9   MCHC 30.3*     Lipid Panel:   Recent Labs   Lab 11/28/18  0100   CHOL 147   LDLCALC 83.2   HDL 46   TRIG 89     Coagulation:   Recent Labs   Lab 12/03/18  0543   INR 1.5*   APTT 53.8*     Platelet Aggregation Study: No results for input(s): PLTAGG, PLTAGINTERP, PLTAGREGLACO, ADPPLTAGGREG in the last 168 hours.  Hgb A1C:   Recent Labs   Lab  11/28/18  0100   HGBA1C 5.0     TSH:   Recent Labs   Lab 11/28/18  0100   TSH 0.834       Diagnostic Results     Brain imaging:  CT Head. Date: 11/30/18 @0300  Continued temporal evolution of left MCA territory distribution acute infarct.  No evidence of midline shift or new large parenchymal hemorrhage.        MRI brain 11/28/18:  -Moderate size regions of signal abnormality left MCA territory compatible with recent infarction.   -Please note small component of hemorrhage within the left mesial temporal component of infarction.  -In addition there is superimposed small region of recent infarction in the right inferior frontal lobe.  -Generalized cerebral volume loss with scattered T2 FLAIR signal hyperintensity supratentorial white matter and lilly concerning for underlying chronic ischemic change.  -No evidence for hydrocephalus with left middle cranial fossa probable rack node cyst         CT Head. Date: 11/27/18  No CT evidence of acute intracranial abnormality.     Vessel Imaging:  CTA Multiphase. Date: 11/27/18  Partial opacification of the left cervical ICA, without meaningful opacification of the ICA distally or intracranially, findings suggests thrombus, either within the vessel itself, or the carotid terminus.  There is some flow within the distal M2 and M3 branches on the left, with improved flow on phase 2 and face 3 however no meaningful flow is identified within the M1 segment on the left.  These findings likely correlate with vague hypoattenuation seen within the region of the left basal ganglia and partially left caudate/subinsular region.    No additional regions of high-grade stenosis or occlusion, please see above for full details.        Cardiac Evaluation:   TTE 11/28/18:  · Severe left atrial enlargement.  · Mildly decreased left ventricular systolic function. The estimated ejection fraction is 45%  · Septal wall has abnormal motion.  · Left ventricular diastolic  dysfunction.  · Moderate-to-severe mitral regurgitation.  · Moderate to severe tricuspid regurgitation.  · The estimated PA systolic pressure is 45.77 mm Hg  · Normal central venous pressure (3 mm Hg).      West Negro MD  [facility]  Department of Vascular Neurology   [facility]

## 2018-12-03 NOTE — PROGRESS NOTES
[facility]  Vascular Neurology  Comprehensive Stroke Center  Progress Note    Assessment/Plan:     * Embolic stroke involving left carotid artery    Apple Watson is a 89 y.o. female with PMHx of HTN and new onset a fib who presented to OSH with L MCA syndrome. She was treated with tPA and transferred to [facility]. CTA with L ICA occlusion, patient taken to IR for possible intervention.     11/29/18: Patient doing much better, able to give thumbs up with both hands, and nodding head appropriately.   11/30/18: Patient not following commands like she was yesterday. More obtunded and lethargic.   11/30 @0300 CT head showed stable, evolving infarct.    Antithrombotics for secondary stroke prevention:  Heparin gtt + ASA 81mg.  Hold off on Plavix for now as high risk of hemorrhagic conversion.    Statins for secondary stroke prevention and hyperlipidemia, if present:   Statins: Atorvastatin- 40 mg daily    Aggressive risk factor modification: HTN, A-Fib     Rehab efforts: Occupational Therapy, PT/OT/SLP to evaluate and treat, PM&R consult     Diagnostics ordered/pending: None    VTE prophylaxis: None: Reason for No Pharmacological VTE Prophylaxis: Mechanical prophylaxis: Place SCDs    BP parameters: Infarct: Post tPA, SBP <180         Atrial fibrillation with RVR    First noted on EKG post hip replacement surgery, 11/11/18, No anticoagulation was initiated at that time.   A fib noted on monitor on arrival in ED, EKG obtained confirmed afib RVR  Will need to discuss secondary stroke prevention with patient and family    Atrial Fibrillation Assessment ([id number]):       Condition Points    C   Congestive heart failure (or Left ventricular systolic dysfunction) 0    H Hypertension: blood pressure consistently above 140/90 mmHg (or treated hypertension on medication) 1    A2  Age ?75 years 2    D  Diabetes Mellitus 0    S2  Prior Stroke or TIA or Thromboembolism  2    V  Vascular disease (e.g. peripheral  artery disease, myocardial infarction, aortic plaque) ?    A  Age 65-74 years 0    Sc  Sex category (i.e. female sex) 1                                                                                  [id number] Score: 6  [id number] Score Stroke Risk %   6 9.8                                                                                  Annual Stroke Risk:  9.8%    Score Risk Anticoagulation Therapy Considerations   2 or greater High Oral anticoagulant Oral anticoagulant, using either a new oral anticoagulant drug   (apixaban, rivaroxaban or dabigatran) or well controlled Warfarin at INR 2.0-3.0             Recommendation:     -[id number] score of 6= 9.% risk.    -If transition to warfarin, would likely need heparin bridge   -Would begin patient on AC prior to discharge, if no other contraindication.    -Rate control with Metoprolol tartrate while inpatient and transition to 24h metoprolol succinate prior to d/c.    -Cardiology following              Received intravenous tissue plasminogen activator (tPA) in emergency department    Admitting to neuro ICU for post tPA monitoring  -- Stepped down on 12/1     Atrial fibrillation    Stroke risk factor  First noted on EKG post hip replacement surgery, 11/11/18  No anticoagulation initiated  Rate treated with metoprolol  Patient returned to normal sinus rhythm spontaneously  -- EKG on admit showed Afib with RVR with repeat showing the same.  -- On heparin gtt as she also had an NSTEMI     NSTEMI (non-ST elevated myocardial infarction)    Trop >50  NSTEMI  -- Cardiology following, appreciate recs.  -- On heparin gtt. Started on ASA 81mg today. Hold on on starting Plavix for now as high risk of hemorrhagic conversion with DAPT and heparin.     Closed fracture of neck of right femur    Recent surgery on 11/10/18     Essential hypertension    Stroke risk factor  SBP <180 post tPA  BP currently at goal          12/01/2018 On heparin gtt. Neurologically she is  fluctuating but is still drowsy and not following commands. She is DNR/DNI. Will need discussion for PEG placement.  Repeat ABG shows pH of 7.50 (7.51 from yesterday) with pCO2 of 32 (down from 36) consistent with resp alkalosis. No evidence of hypoxia.   12/02/2018 Became tachypneic and tachycardic overnight requiring BiPAP. Given Lasix 40mg x1. Tube feeds held. Hospital medicine consulted. Started on ASA along with continued heparin gtt.    STROKE DOCUMENTATION   Acute Stroke Times   Last Known Normal Date: 11/27/18  Last Known Normal Time: 1300  Symptom Onset Date: 11/27/18  Symptom Onset Time: 1330  Stroke Team Called Date: 11/27/18  Stroke Team Called Time: 1649  Stroke Team Arrival Date: 11/27/18  Stroke Team Arrival Time: 1650  CT Interpretation Time: 1718  Decision to Treat Time for Alteplase: (administed at OSH via telemedicine)  Decision to Treat Time for IR: 1732    NIH Scale:  1a. Level Of Consciousness: 2-->Not alert: requires repeated stimulation to attend, or is obtunded and requires strong or painful stimulation to make movements (not stereotyped)  1b. LOC Questions: 2-->Answers neither question correctly  1c. LOC Commands: 2-->Performs neither task correctly  2. Best Gaze: 1-->Partial gaze palsy: gaze is abnormal in one or both eyes, but forced deviation or total gaze paresis is not present  3. Visual: 0-->No visual loss  4. Facial Palsy: 2-->Partial paralysis (total or near-total paralysis of lower face)  5a. Motor Arm, Left: 1-->Drift: limb holds 90 (or 45) degrees, but drifts down before full 10 seconds: does not hit bed or other support  5b. Motor Arm, Right: 2-->Some effort against gravity: limb cannot get to or maintain (if cued) 90 (or 45) degrees, drifts down to bed, but has some effort against gravity  6a. Motor Leg, Left: 2-->Some effort against gravity: leg falls to bed by 5 secs, but has some effort against gravity  6b. Motor Leg, Right: 3-->No effort against gravity: leg falls to bed  immediately  7. Limb Ataxia: 1-->Present in one limb  8. Sensory: 1-->Mild-to-moderate sensory loss: patient feels pinprick is less sharp or is dull on the affected side: or there is a loss of superficial pain with pinprick, but patient is aware of being touched  9. Best Language: 3-->Mute, global aphasia: no usable speech or auditory comprehension  10. Dysarthria: 2-->Severe dysarthria: patients speech is so slurred as to be unintelligible in the absence of or out of proportion to any dysphasia, or is mute/anarthric  11. Extinction and Inattention (formerly Neglect): 0-->No abnormality  Total (NIH Stroke Scale): 24       Modified [place] Score: 3  [place] Coma Scale:    ABCD2 Score:    [id number] Score:   HAS -BLED Score:   ICH Score:   Hunt & Tapia Classification:      Hemorrhagic change of an Ischemic Stroke: Does this patient have an ischemic stroke with hemorrhagic changes? No     Neurologic Chief Complaint: L ICA terminus occlusion  RSW, RFD, L-gaze, Mute    Subjective:     Interval History: Patient is seen for follow-up neurological assessment and treatment recommendations: Acute onset RSW with aphasia, Imaging showed left carotid artery infarct s/p tPa and thrombectomy.  Currently on heparin gtt for NSTEMI. Added on ASA today.   GOC discussion ongoing.      HPI, Past Medical, Family, and Social History remains the same as documented in the initial encounter.     Review of Systems   Unable to perform ROS: Patient nonverbal     Scheduled Meds:   acetylcysteine 100 mg/ml (10%)  4 mL Nebulization TID WAKE    albuterol-ipratropium  3 mL Nebulization Q6H    aspirin  81 mg Oral Daily    atorvastatin  40 mg Oral Daily    furosemide  40 mg Intravenous BID    lisinopril  2.5 mg Oral Daily    metoprolol tartrate  50 mg Per NG tube Q6H    sodium chloride 0.9%  3 mL Intravenous Q8H     Continuous Infusions:   heparin (porcine) in D5W 14 Units/kg/hr (12/01/18 0988)     PRN Meds:acetaminophen,  albuterol-ipratropium, dextrose 50%, glucagon (human recombinant), insulin aspart U-100, ondansetron, sodium chloride 0.9%    Objective:     Vital Signs (Most Recent):  Temp: 97.7 °F (36.5 °C) (12/02/18 1527)  Pulse: 86 (12/02/18 1527)  Resp: 17 (12/02/18 1527)  BP: (!) 147/92 (12/02/18 1527)  SpO2: 100 % (12/02/18 1527)  BP Location: Right arm    Vital Signs Range (Last 24H):  Temp:  [96.3 °F (35.7 °C)-98.6 °F (37 °C)]   Pulse:  []   Resp:  [12-36]   BP: (112-147)/(72-97)   SpO2:  [97 %-100 %]   BP Location: Right arm    Physical Exam   Eyes:   Sluggish pupils.   Cardiovascular: Normal rate and regular rhythm.   Pulmonary/Chest: She has rales.   Abdominal: Soft.   Nursing note and vitals reviewed.      Neurological Exam:   LOC: Nonverbal  Language: Global aphasia  Visual Fields: Full  EOM (CN III, IV, VI): Gaze preference  left  Pupils (CN II, III): Sluggish pupils.  Motor: Unable to assess strength as patient is non verbal and not following commands. However she seem weaker on the right than the left.    Laboratory:  BMP:   Recent Labs   Lab 12/02/18 0233      K 4.8      CO2 23   BUN 49*   CREATININE 0.8   CALCIUM 8.6*     CBC:   Recent Labs   Lab 12/02/18 0233   WBC 16.62*   RBC 3.67*   HGB 10.8*   HCT 35.3*   *   MCV 96   MCH 29.4   MCHC 30.6*     Lipid Panel:   Recent Labs   Lab 11/28/18 0100   CHOL 147   LDLCALC 83.2   HDL 46   TRIG 89     Coagulation:   Recent Labs   Lab 12/02/18 0233   INR 1.3*   APTT 52.8*     Platelet Aggregation Study: No results for input(s): PLTAGG, PLTAGINTERP, PLTAGREGLACO, ADPPLTAGGREG in the last 168 hours.  Hgb A1C:   Recent Labs   Lab 11/28/18 0100   HGBA1C 5.0     TSH:   Recent Labs   Lab 11/28/18  0100   TSH 0.834       Diagnostic Results     Brain imaging:  CT Head. Date: 11/30/18 @0300  Continued temporal evolution of left MCA territory distribution acute infarct.  No evidence of midline shift or new large parenchymal hemorrhage.        MRI brain  11/28/18:  -Moderate size regions of signal abnormality left MCA territory compatible with recent infarction.   -Please note small component of hemorrhage within the left mesial temporal component of infarction.  -In addition there is superimposed small region of recent infarction in the right inferior frontal lobe.  -Generalized cerebral volume loss with scattered T2 FLAIR signal hyperintensity supratentorial white matter and lilly concerning for underlying chronic ischemic change.  -No evidence for hydrocephalus with left middle cranial fossa probable rack node cyst        CT Head. Date: 11/27/18  No CT evidence of acute intracranial abnormality.     Vessel Imaging:  CTA Multiphase. Date: 11/27/18  Partial opacification of the left cervical ICA, without meaningful opacification of the ICA distally or intracranially, findings suggests thrombus, either within the vessel itself, or the carotid terminus.  There is some flow within the distal M2 and M3 branches on the left, with improved flow on phase 2 and face 3 however no meaningful flow is identified within the M1 segment on the left.  These findings likely correlate with vague hypoattenuation seen within the region of the left basal ganglia and partially left caudate/subinsular region.    No additional regions of high-grade stenosis or occlusion, please see above for full details.        Cardiac Evaluation:   TTE 11/28/18:  · Severe left atrial enlargement.  · Mildly decreased left ventricular systolic function. The estimated ejection fraction is 45%  · Septal wall has abnormal motion.  · Left ventricular diastolic dysfunction.  · Moderate-to-severe mitral regurgitation.  · Moderate to severe tricuspid regurgitation.  · The estimated PA systolic pressure is 45.77 mm Hg  · Normal central venous pressure (3 mm Hg).             West Negro MD  Comprehensive Stroke Center  Department of Vascular Neurology   [facility]

## 2018-12-03 NOTE — PROGRESS NOTES
[facility] Hospital Medicine  Progress Note    Patient Name: Apple Watson  MRN: [id number]  Patient Class: IP- Inpatient   Admission Date: 11/27/2018  Length of Stay: 6 days  Attending Physician: Heather García MD  Primary Care Provider: Tree Rolon MD    Hospital Medicine Team: Networked reference to record PCT  Russell Tavares MD    Subjective:     Principal Problem:Embolic stroke involving left carotid artery    HPI:  Ms. Watson is a 89 year old female with a pmhx of Htn, New onset afib, and Neuropathy who presents to [facility] for a higher level of care for LMCA syndrome s/p TPA and LICA Thrombectomy. The patient initially presented to [facility] with severe right sided weakness and aphasia with a NIH stroke scale of 22. Tpa end time was 1642.     Hospital medicine consulted for assistance with CHF therapy.     Hospital Course:  No notes on file    Interval History: tachypneic this morning. Increased sputum production. WBC elevated to 18. Afebrile. Cr increased to 1.1 from 0.8 with diuresis. Does not follow commands    Review of Systems   Unable to perform ROS: Acuity of condition     Objective:     Vital Signs (Most Recent):  Temp: 97.6 °F (36.4 °C) (12/03/18 1150)  Pulse: (!) 54 (12/03/18 1342)  Resp: (!) 24 (12/03/18 1342)  BP: (!) 170/93 (12/03/18 1150)  SpO2: (!) 94 % (12/03/18 1342) Vital Signs (24h Range):  Temp:  [97.4 °F (36.3 °C)-98.8 °F (37.1 °C)] 97.6 °F (36.4 °C)  Pulse:  [] 54  Resp:  [16-24] 24  SpO2:  [93 %-100 %] 94 %  BP: (136-170)/(65-94) 170/93     Weight: 63.5 kg (139 lb 15.9 oz)  Body mass index is 28.6 kg/m².    Intake/Output Summary (Last 24 hours) at 12/3/2018 1417  Last data filed at 12/3/2018 0407  Gross per 24 hour   Intake --   Output 1800 ml   Net -1800 ml      Physical Exam   Constitutional:   Pt non-verbal   HENT:   Head: Normocephalic and atraumatic.   Cardiovascular: Normal heart sounds and intact distal pulses.   Pulmonary/Chest: She has  wheezes. She has rales.   tachypneic   Thick sputum, unable to cough    Abdominal: Soft. Bowel sounds are normal. She exhibits no mass. There is no tenderness. There is no guarding.   Musculoskeletal: She exhibits no edema.   Neurological:   Patient not following commands    Skin: Skin is warm.   Nursing note and vitals reviewed.      Significant Labs: All pertinent labs within the past 24 hours have been reviewed.    Significant Imaging: I have reviewed all pertinent imaging results/findings within the past 24 hours.    Assessment/Plan:      Acute respiratory failure with hypoxia    - likely multifactorial including CHF exacerbation, aspiration pneumonia, and possible mucus plugging in the setting of thick secretions that are unable to expectorated  - Given recent stroke, NSTEMI, and very poor functional status, family has elected to pursue inpatient hospice  - Recommend not escalating care and focus more on symptom management/pain control  - Agree with palliative care consult        Congestive heart failure (CHF)    Pt presents w/ new onset evidence suggesting CHF. Recent echo (11/28) reveals EF 45%, decreased from 60% (11/12). Crackles and productive cough present on physical exam. Pt's family denies history of CHF.    - BNP 2940 (12/1)  - CXR shows evidence of congestion and bilateral airspace opacities suggestive of pulmonary edema  - TTE 11/29 shows EF 45% with diastolic dysfunction     Recommendations:  - Hold evening lasix given JENA. Can an additional dose for palliative purposes to reduce help resp distress  - Continue ACEi 2.5mg daily   - Plan to transfer patient to inpatient hospice               VTE Risk Mitigation (From admission, onward)        Ordered     heparin 25,000 units in dextrose 5% 250 ml (100 units/mL) infusion MINIMAL INTENSITY nomogram - [facility]  Continuous      11/28/18 1857     Reason for No Pharmacological VTE Prophylaxis  Once      11/27/18 1743     IP VTE HIGH RISK PATIENT  Once       11/27/18 1743     Place sequential compression device  Until discontinued      11/27/18 1743              Russell Tavares MD  Department of Hospital Medicine   [facility]                    12/03/2018                             STAFF PHYSICIAN NOTE                                   Attending Attestation for Rounds with Resident  I have reviewed and concur with the resident's history, physical, assessment, and plan.  I have personally interviewed and examined the patient at bedside and agree with the resident's findings.                                  ________________________________________                                     REASON FOR ADMISSION:     Patient is 89 y.o.female    Body mass index is 28.6 kg/m².,  Embolic stroke involving left carotid artery

## 2018-12-03 NOTE — PLAN OF CARE
Problem: Patient Care Overview  Goal: Plan of Care Review  Outcome: Ongoing (interventions implemented as appropriate)  POC reviewed with daughter and son. NGT has been removed and TF's are d/c'd. Hospice care has been consulted. Pt on tele, a-fib. Tachypnea, MD aware.  HERMANN pt completely, very lethargic. SR upx3, O2 2L NC. Will continue to monitor.

## 2018-12-03 NOTE — PLAN OF CARE
CHING spoke to MD in reference to family's decision on hospice. They would like patient to be inpatient with [facility] here [facility]. CHING contacted [facility] in reference to referral. She states that the patient would have to be too unstable to transport in order for patient to meet criteria for the inpatient hospice beds at the hospital. She states that if the patient is stable enough to transfer, patient would go to their inpatient facility. Referral sent in .     12/03/18 6249   Post-Acute Status   Post-Acute Authorization Home Health/Hospice   Home Health/Hospice Status Referrals Sent

## 2018-12-03 NOTE — SUBJECTIVE & OBJECTIVE
Interval History: much more comfortable after receiving morphine.    Past Medical History:   Diagnosis Date    Atrial fibrillation with RVR 11/28/2018    Depression     Hypertension     Neuropathy        Past Surgical History:   Procedure Laterality Date    APPENDECTOMY      ARTHROPLASTY-HIP-NOLBERTO Right 11/10/2018    Performed by Nasir Danielle MD at [facility] OR    GALLBLADDER SURGERY      HEMIARTHROPLASTY OF HIP Right 11/10/2018    Procedure: ARTHROPLASTY-HIP-NOLBERTO;  Surgeon: Nasir Danielle MD;  Location: [facility] OR;  Service: Orthopedics;  Laterality: Right;  Depuy Bipolar  LAteral positioner with positioners    HYSTERECTOMY      TONSILLECTOMY         Review of patient's allergies indicates:   Allergen Reactions    Celebrex [celecoxib] Other (See Comments)     Blood in bowel    Cymbalta [duloxetine]     Iodine and iodide containing products     Lovastatin     Pneumococcal 23-gregorio ps vaccine      Per chart    Sulpho-lac [sulfur]     Vioxx [rofecoxib]     Savella [milnacipran] Rash       Medications:  Continuous Infusions:   heparin (porcine) in D5W 14 Units/kg/hr (12/03/18 0407)     Scheduled Meds:   acetylcysteine 100 mg/ml (10%)  4 mL Nebulization TID WAKE    albuterol-ipratropium  3 mL Nebulization Q6H    aspirin  81 mg Oral Daily    atorvastatin  40 mg Oral Daily    lisinopril  2.5 mg Oral Daily    sodium chloride 0.9%  3 mL Intravenous Q8H     PRN Meds:acetaminophen, albuterol-ipratropium, dextrose 50%, glucagon (human recombinant), insulin aspart U-100, ondansetron, sodium chloride 0.9%    Family History     None        Tobacco Use    Smoking status: Never Smoker    Smokeless tobacco: Never Used   Substance and Sexual Activity    Alcohol use: No    Drug use: No    Sexual activity: No       Review of Systems   Unable to perform ROS: Patient nonverbal     Objective:     Vital Signs (Most Recent):  Temp: (!) 95.6 °F (35.3 °C) (12/03/18 1527)  Pulse: (!) 117 (12/03/18 1527)  Resp: 16  (12/03/18 1527)  BP: 130/67 (12/03/18 1527)  SpO2: (!) 92 % (12/03/18 1527) Vital Signs (24h Range):  Temp:  [95.6 °F (35.3 °C)-98.8 °F (37.1 °C)] 95.6 °F (35.3 °C)  Pulse:  [] 117  Resp:  [16-24] 16  SpO2:  [92 %-98 %] 92 %  BP: (130-170)/(65-94) 130/67     Weight: 63.5 kg (139 lb 15.9 oz)  Body mass index is 28.6 kg/m².    Review of Symptoms  Symptom Assessment (ESAS 0-10 scale)   ESAS 0 1 2 3 4 5 6 7 8 9 10   Pain x             Dyspnea x             Anxiety x             Nausea x             Depression  x             Anorexia x             Fatigue x             Insomnia x             Restlessness  x             Agitation x             CAM / Delirium x__ --  ___+   Constipation     _x_ --  ___+   Diarrhea           x__ --  ___+  Bowel Management Plan (BMP): No    Comments: no oral intake    Pain Assessment: none  OME in 24 hours: 2 mg IV    Performance Status: 10    ECOG Performance Status Grade: 4 - Completely disabled    Physical Exam   Nursing note and vitals reviewed.      Significant Labs: All pertinent labs within the past 24 hours have been reviewed.  CBC:   Recent Labs   Lab 12/03/18  0543   WBC 17.97*   HGB 12.3   HCT 40.6   MCV 95   *     BMP:  Recent Labs   Lab 12/03/18  0543      *   K 4.2      CO2 23   BUN 61*   CREATININE 1.1   CALCIUM 8.9   MG 3.0*     LFT:  Lab Results   Component Value Date     (H) 12/03/2018    ALKPHOS 152 (H) 12/03/2018    BILITOT 0.8 12/03/2018     Albumin:   Albumin   Date Value Ref Range Status   12/03/2018 2.7 (L) 3.5 - 5.2 g/dL Final     Protein:   Total Protein   Date Value Ref Range Status   12/03/2018 6.2 6.0 - 8.4 g/dL Final     Lactic acid:   No results found for: LACTATE    Significant Imaging: I have reviewed all pertinent imaging results/findings within the past 24 hours.    Advanced Directives::  Living Will: No  LaPOST: No  Do Not Resuscitate Status: Yes  Medical Power of : No  Son in the room. He and siblings want to  provide comfort focused treatments.  Decision-Making Capacity: Family answered questions, Patient unable to communicate due to disease severity/cognitive impairment    Living Arrangements: Lives in home

## 2018-12-03 NOTE — PT/OT/SLP PROGRESS
Speech Language Pathology      Apple Watson  MRN: [id number]    Attempted to see pt this am and pm.  In morning, nursing requested ST hold secondary issues with heart rate.  In afternoon, pt/family meeting with hospice for possibility of transfer to hospice care.  Will continue to monitor.    SAMY Finney, CCC-SLP   12/3/2018

## 2018-12-03 NOTE — SUBJECTIVE & OBJECTIVE
Interval History: tachypneic this morning. Increased sputum production. WBC elevated to 18. Afebrile. Cr increased to 1.1 from 0.8 with diuresis. Does not follow commands    Review of Systems   Unable to perform ROS: Acuity of condition     Objective:     Vital Signs (Most Recent):  Temp: 97.6 °F (36.4 °C) (12/03/18 1150)  Pulse: (!) 54 (12/03/18 1342)  Resp: (!) 24 (12/03/18 1342)  BP: (!) 170/93 (12/03/18 1150)  SpO2: (!) 94 % (12/03/18 1342) Vital Signs (24h Range):  Temp:  [97.4 °F (36.3 °C)-98.8 °F (37.1 °C)] 97.6 °F (36.4 °C)  Pulse:  [] 54  Resp:  [16-24] 24  SpO2:  [93 %-100 %] 94 %  BP: (136-170)/(65-94) 170/93     Weight: 63.5 kg (139 lb 15.9 oz)  Body mass index is 28.6 kg/m².    Intake/Output Summary (Last 24 hours) at 12/3/2018 1417  Last data filed at 12/3/2018 0407  Gross per 24 hour   Intake --   Output 1800 ml   Net -1800 ml      Physical Exam   Constitutional:   Pt non-verbal   HENT:   Head: Normocephalic and atraumatic.   Cardiovascular: Normal heart sounds and intact distal pulses.   Pulmonary/Chest: She has wheezes. She has rales.   tachypneic   Thick sputum, unable to cough    Abdominal: Soft. Bowel sounds are normal. She exhibits no mass. There is no tenderness. There is no guarding.   Musculoskeletal: She exhibits no edema.   Neurological:   Patient not following commands    Skin: Skin is warm.   Nursing note and vitals reviewed.      Significant Labs: All pertinent labs within the past 24 hours have been reviewed.    Significant Imaging: I have reviewed all pertinent imaging results/findings within the past 24 hours.

## 2018-12-03 NOTE — ASSESSMENT & PLAN NOTE
First noted on EKG post hip replacement surgery, 11/11/18, No anticoagulation was initiated at that time.   A fib noted on monitor on arrival in ED, EKG obtained confirmed afib RVR  Will need to discuss secondary stroke prevention with patient and family    Atrial Fibrillation Assessment ([id number]):       Condition Points    C   Congestive heart failure (or Left ventricular systolic dysfunction) 0    H Hypertension: blood pressure consistently above 140/90 mmHg (or treated hypertension on medication) 1    A2  Age ?75 years 2    D  Diabetes Mellitus 0    S2  Prior Stroke or TIA or Thromboembolism  2    V  Vascular disease (e.g. peripheral artery disease, myocardial infarction, aortic plaque) ?    A  Age 65-74 years 0    Sc  Sex category (i.e. female sex) 1                                                                                  [id number]-TGSx Score: 6  [id number] Score Stroke Risk %   6 9.8                                                                                  Annual Stroke Risk:  9.8%    Score Risk Anticoagulation Therapy Considerations   2 or greater High Oral anticoagulant Oral anticoagulant, using either a new oral anticoagulant drug   (apixaban, rivaroxaban or dabigatran) or well controlled Warfarin at INR 2.0-3.0             Recommendation:     -[id number] score of 6= 9.% risk.    -If transition to warfarin, would likely need heparin bridge   -Would begin patient on AC prior to discharge, if no other contraindication.    -Rate control with Metoprolol tartrate while inpatient and transition to 24h metoprolol succinate prior to d/c.    -Cardiology following

## 2018-12-03 NOTE — ASSESSMENT & PLAN NOTE
Trop >50  NSTEMI  -- Cardiology following, appreciate recs.  -- On heparin gtt. Started on ASA 81mg today. Hold on on starting Plavix for now as high risk of hemorrhagic conversion with DAPT and heparin.

## 2018-12-03 NOTE — SUBJECTIVE & OBJECTIVE
Neurologic Chief Complaint: L ICA terminus occlusion  RSW, RFD, L-gaze, Mute    Subjective:     Interval History: Patient is seen for follow-up neurological assessment and treatment recommendations: Acute onset RSW with aphasia, Imaging showed left carotid artery infarct s/p tPa and thrombectomy.  Currently on heparin gtt for NSTEMI. Added on ASA today.   GOC discussion ongoing.      HPI, Past Medical, Family, and Social History remains the same as documented in the initial encounter.     Review of Systems   Unable to perform ROS: Patient nonverbal     Scheduled Meds:   acetylcysteine 100 mg/ml (10%)  4 mL Nebulization TID WAKE    albuterol-ipratropium  3 mL Nebulization Q6H    aspirin  81 mg Oral Daily    atorvastatin  40 mg Oral Daily    furosemide  40 mg Intravenous BID    lisinopril  2.5 mg Oral Daily    metoprolol tartrate  50 mg Per NG tube Q6H    sodium chloride 0.9%  3 mL Intravenous Q8H     Continuous Infusions:   heparin (porcine) in D5W 14 Units/kg/hr (12/01/18 0736)     PRN Meds:acetaminophen, albuterol-ipratropium, dextrose 50%, glucagon (human recombinant), insulin aspart U-100, ondansetron, sodium chloride 0.9%    Objective:     Vital Signs (Most Recent):  Temp: 97.7 °F (36.5 °C) (12/02/18 1527)  Pulse: 86 (12/02/18 1527)  Resp: 17 (12/02/18 1527)  BP: (!) 147/92 (12/02/18 1527)  SpO2: 100 % (12/02/18 1527)  BP Location: Right arm    Vital Signs Range (Last 24H):  Temp:  [96.3 °F (35.7 °C)-98.6 °F (37 °C)]   Pulse:  []   Resp:  [12-36]   BP: (112-147)/(72-97)   SpO2:  [97 %-100 %]   BP Location: Right arm    Physical Exam   Eyes:   Sluggish pupils.   Cardiovascular: Normal rate and regular rhythm.   Pulmonary/Chest: She has rales.   Abdominal: Soft.   Nursing note and vitals reviewed.      Neurological Exam:   LOC: Nonverbal  Language: Global aphasia  Visual Fields: Full  EOM (CN III, IV, VI): Gaze preference  left  Pupils (CN II, III): Sluggish pupils.  Motor: Unable to assess strength  as patient is non verbal and not following commands. However she seem weaker on the right than the left.    Laboratory:  BMP:   Recent Labs   Lab 12/02/18 0233      K 4.8      CO2 23   BUN 49*   CREATININE 0.8   CALCIUM 8.6*     CBC:   Recent Labs   Lab 12/02/18 0233   WBC 16.62*   RBC 3.67*   HGB 10.8*   HCT 35.3*   *   MCV 96   MCH 29.4   MCHC 30.6*     Lipid Panel:   Recent Labs   Lab 11/28/18 0100   CHOL 147   LDLCALC 83.2   HDL 46   TRIG 89     Coagulation:   Recent Labs   Lab 12/02/18 0233   INR 1.3*   APTT 52.8*     Platelet Aggregation Study: No results for input(s): PLTAGG, PLTAGINTERP, PLTAGREGLACO, ADPPLTAGGREG in the last 168 hours.  Hgb A1C:   Recent Labs   Lab 11/28/18 0100   HGBA1C 5.0     TSH:   Recent Labs   Lab 11/28/18 0100   TSH 0.834       Diagnostic Results     Brain imaging:  CT Head. Date: 11/30/18 @0300  Continued temporal evolution of left MCA territory distribution acute infarct.  No evidence of midline shift or new large parenchymal hemorrhage.        MRI brain 11/28/18:  -Moderate size regions of signal abnormality left MCA territory compatible with recent infarction.   -Please note small component of hemorrhage within the left mesial temporal component of infarction.  -In addition there is superimposed small region of recent infarction in the right inferior frontal lobe.  -Generalized cerebral volume loss with scattered T2 FLAIR signal hyperintensity supratentorial white matter and lilly concerning for underlying chronic ischemic change.  -No evidence for hydrocephalus with left middle cranial fossa probable rack node cyst        CT Head. Date: 11/27/18  No CT evidence of acute intracranial abnormality.     Vessel Imaging:  CTA Multiphase. Date: 11/27/18  Partial opacification of the left cervical ICA, without meaningful opacification of the ICA distally or intracranially, findings suggests thrombus, either within the vessel itself, or the carotid terminus.   There is some flow within the distal M2 and M3 branches on the left, with improved flow on phase 2 and face 3 however no meaningful flow is identified within the M1 segment on the left.  These findings likely correlate with vague hypoattenuation seen within the region of the left basal ganglia and partially left caudate/subinsular region.    No additional regions of high-grade stenosis or occlusion, please see above for full details.        Cardiac Evaluation:   TTE 11/28/18:  · Severe left atrial enlargement.  · Mildly decreased left ventricular systolic function. The estimated ejection fraction is 45%  · Septal wall has abnormal motion.  · Left ventricular diastolic dysfunction.  · Moderate-to-severe mitral regurgitation.  · Moderate to severe tricuspid regurgitation.  · The estimated PA systolic pressure is 45.77 mm Hg  · Normal central venous pressure (3 mm Hg).

## 2018-12-03 NOTE — CONSULTS
Palliative Care Consult.    Consult received. Will review chart and discuss with team prior to seeing patient.  Thank you for the consult..    Kalie LIN, ACNS-BC, ACHPN

## 2018-12-03 NOTE — ASSESSMENT & PLAN NOTE
- likely multifactorial including CHF exacerbation, aspiration pneumonia, and possible mucus plugging in the setting of thick secretions that are unable to expectorated  - Given recent stroke, NSTEMI, and very poor functional status, family has elected to pursue inpatient hospice  - Recommend not escalating care and focus more on symptom management/pain control  - Agree with palliative care consult

## 2018-12-03 NOTE — ASSESSMENT & PLAN NOTE
First noted on EKG post hip replacement surgery, 11/11/18, No anticoagulation was initiated at that time.   A fib noted on monitor on arrival in ED, EKG obtained confirmed afib RVR  Will need to discuss secondary stroke prevention with patient and family    Atrial Fibrillation Assessment ([id number]):       Condition Points    C   Congestive heart failure (or Left ventricular systolic dysfunction) 0    H Hypertension: blood pressure consistently above 140/90 mmHg (or treated hypertension on medication) 1    A2  Age ?75 years 2    D  Diabetes Mellitus 0    S2  Prior Stroke or TIA or Thromboembolism  2    V  Vascular disease (e.g. peripheral artery disease, myocardial infarction, aortic plaque) ?    A  Age 65-74 years 0    Sc  Sex category (i.e. female sex) 1                                                                                  KSF2ES5-WRUp Score: 6  [id number] Score Stroke Risk %   6 9.8                                                                                  Annual Stroke Risk:  9.8%    Score Risk Anticoagulation Therapy Considerations   2 or greater High Oral anticoagulant Oral anticoagulant, using either a new oral anticoagulant drug   (apixaban, rivaroxaban or dabigatran) or well controlled Warfarin at INR 2.0-3.0             Recommendation:     -[id number] score of 6= 9.% risk.    -If transition to warfarin, would likely need heparin bridge   -Would begin patient on AC prior to discharge, if no other contraindication.    -Rate control with Metoprolol tartrate while inpatient and transition to 24h metoprolol succinate prior to d/c.    -Cardiology following

## 2018-12-03 NOTE — ASSESSMENT & PLAN NOTE
Stroke risk factor  First noted on EKG post hip replacement surgery, 11/11/18  No anticoagulation initiated  Rate treated with metoprolol  Patient returned to normal sinus rhythm spontaneously  -- EKG on admit showed Afib with RVR with repeat showing the same.  -- On heparin gtt as she also had an NSTEMI

## 2018-12-03 NOTE — ASSESSMENT & PLAN NOTE
"Patient has had a stroke and nstemi. Her family would like her goals of care to be comfort.  Have consulted [facility] for inpatient hospice bed.  Son has "signed papers". Awaiting disposition.  She is much more comfortable since receiving Morphine 2 mg. Would use 2 mg every 2 hours as needed for shortness of breath or chest pain.  May need lorazepam for agitation and robinul for secretions.  Would discontinue heparin upon transfer to hospice along with other non comfort focused treatments.  "

## 2018-12-03 NOTE — ASSESSMENT & PLAN NOTE
Apple Watson is a 89 y.o. female with PMHx of HTN and new onset a fib who presented to OSH with L MCA syndrome. She was treated with tPA and transferred to [facility]. CTA with L ICA occlusion, patient taken to IR for possible intervention.     11/29/18: Patient doing much better, able to give thumbs up with both hands, and nodding head appropriately.   11/30/18: Patient not following commands like she was yesterday. More obtunded and lethargic.   11/30 @0300 CT head showed stable, evolving infarct.    Antithrombotics for secondary stroke prevention:  Continue Heparin gtt. ASA discontinued as she is being transitioned to comfort care.      Statins for secondary stroke prevention and hyperlipidemia, if present:   Statins: None: Reason: Comfort care    Aggressive risk factor modification: HTN, A-Fib     Rehab efforts: Occupational Therapy, PT/OT/SLP to evaluate and treat, PM&R consult     Diagnostics ordered/pending: None    VTE prophylaxis: None: Reason for No Pharmacological VTE Prophylaxis: Mechanical prophylaxis: Place SCDs    BP parameters: Infarct: Post tPA, SBP <180

## 2018-12-03 NOTE — ASSESSMENT & PLAN NOTE
Apple Watson is a 89 y.o. female with PMHx of HTN and new onset a fib who presented to OSH with L MCA syndrome. She was treated with tPA and [place] [facility] CTA with L ICA occlusion, patient taken to IR for possible intervention.     11/29/18: Patient doing much better, able to give thumbs up with both hands, and nodding head appropriately.   11/30/18: Patient not following commands like she was yesterday. More obtunded and lethargic.   11/30 @0300 CT head showed stable, evolving infarct.    Antithrombotics for secondary stroke prevention:  Heparin gtt + ASA 81mg.  Hold off on Plavix for now as high risk of hemorrhagic conversion.    Statins for secondary stroke prevention and hyperlipidemia, if present:   Statins: Atorvastatin- 40 mg daily    Aggressive risk factor modification: HTN, A-Fib     Rehab efforts: Occupational Therapy, PT/OT/SLP to evaluate and treat, PM&R consult     Diagnostics ordered/pending: None    VTE prophylaxis: None: Reason for No Pharmacological VTE Prophylaxis: Mechanical prophylaxis: Place SCDs    BP parameters: Infarct: Post tPA, SBP <180

## 2018-12-03 NOTE — PLAN OF CARE
Problem: Patient Care Overview  Goal: Plan of Care Review  Outcome: Ongoing (interventions implemented as appropriate)  No acute issues, NGT placement verified by xray and radiologist report.  Continued holding tube feeds due to thick secretions. WCM.

## 2018-12-03 NOTE — PLAN OF CARE
CM spoke to Patty with [facility] who came to evaluate patient and met with patient's son to sign papers. Patient has been accepted to their inpatient facility. Form has been completed by MD and faxed to Patty. MD notified CHING that the patient's family is requesting for patient to discharge to hospice facility tomorrow. CM left message for Patty with [facility] to notify of d/c tomorrow.         12/03/18 3186   Post-Acute Status   Post-Acute Authorization Home Health/Hospice   Home Health/Hospice Status Authorization Obtained

## 2018-12-03 NOTE — PLAN OF CARE
CM spoke to Patty with [facility]. She has been in contact with the patient's daughter and is coming to hospital to speak with patient's son. CHING will continue to follow.     12/03/18 1329   Post-Acute Status   Post-Acute Authorization Home Health/Hospice   Home Health/Hospice Status Pending Clinical Review

## 2018-12-03 NOTE — ASSESSMENT & PLAN NOTE
- Palliative care consulted, appreciate recs.  - Discussed with the family regarding hospice. After a prolonged discussion, they opted for comfort care and transfer to hospice.   - Comfort care orders placed.  - Patient to be transferred to [facility] on 12/4.

## 2018-12-03 NOTE — PLAN OF CARE
[facility]     Department of Hospital Medicine     [address] [place] [address]     [phone] after hours  [phone] fax                                   HOSPICE  ORDERS     12/03/2018    Admit to Hospice:  [facility]    Diagnoses:  Active Hospital Problems    Diagnosis  POA    *Embolic stroke involving left carotid artery [I63.132]  Yes    Acute respiratory failure with hypoxia [J96.01]  No    Palliative care encounter [Z51.5]  Not Applicable    Congestive heart failure (CHF) [I50.9]  Yes    Atrial fibrillation with RVR [I48.91]  Yes    Ventricular fibrillation [I49.01]  Unknown    Atrial fibrillation [I48.91]  Unknown    Received intravenous tissue plasminogen activator (tPA) in emergency department [Z92.82]  Not Applicable    Cerebrovascular accident (CVA) [I63.9]  Yes    Elevated troponin [R74.8]  Yes    NSTEMI (non-ST elevated myocardial infarction) [I21.4]  Yes    Closed fracture of neck of right femur [S72.001A]  Yes    Essential hypertension [I10]  Yes      Resolved Hospital Problems   No resolved problems to display.       Hospice Qualifying Diagnoses:        Patient has a life expectancy < 6 months due to these conditions.    Vital Signs: Routine per Hospice Protocol.    Allergies:  Review of patient's allergies indicates:   Allergen Reactions    Celebrex [celecoxib] Other (See Comments)     Blood in bowel    Cymbalta [duloxetine]     Iodine and iodide containing products     Lovastatin     Pneumococcal 23-gregorio ps vaccine      Per chart    Sulpho-lac [sulfur]     Vioxx [rofecoxib]     Savella [milnacipran] Rash       Diet: Comfort Feeds    Activities: As tolerated    Nursing: Per Hospice Routine    Future Orders:  Hospice Medical Director may dictate new orders for comfortable care measures & sign death certificate.    Medications:   Comfort Care Medications Only -  Morphine 2mg q2H PRN for pain or  shortness of breath.   Ativan 1mg q30min PRN for agitation  Scopolamine patch q3days for secretions.  Additional meds as needed.        _________________________________  West Negro MD  12/03/2018

## 2018-12-04 VITALS
OXYGEN SATURATION: 95 % | DIASTOLIC BLOOD PRESSURE: 56 MMHG | BODY MASS INDEX: 28.22 KG/M2 | SYSTOLIC BLOOD PRESSURE: 139 MMHG | HEART RATE: 153 BPM | HEIGHT: 59 IN | TEMPERATURE: 99 F | RESPIRATION RATE: 18 BRPM | WEIGHT: 140 LBS

## 2018-12-04 PROCEDURE — 99900035 HC TECH TIME PER 15 MIN (STAT)

## 2018-12-04 PROCEDURE — 25000003 PHARM REV CODE 250: Performed by: NURSE PRACTITIONER

## 2018-12-04 PROCEDURE — 63600175 PHARM REV CODE 636 W HCPCS: Performed by: STUDENT IN AN ORGANIZED HEALTH CARE EDUCATION/TRAINING PROGRAM

## 2018-12-04 PROCEDURE — 99232 SBSQ HOSP IP/OBS MODERATE 35: CPT | Mod: ,,, | Performed by: INTERNAL MEDICINE

## 2018-12-04 PROCEDURE — 63600175 PHARM REV CODE 636 W HCPCS: Performed by: NURSE PRACTITIONER

## 2018-12-04 PROCEDURE — A4216 STERILE WATER/SALINE, 10 ML: HCPCS | Performed by: NURSE PRACTITIONER

## 2018-12-04 PROCEDURE — 99239 HOSP IP/OBS DSCHRG MGMT >30: CPT | Mod: GC,,, | Performed by: PSYCHIATRY & NEUROLOGY

## 2018-12-04 RX ORDER — HEPARIN SODIUM,PORCINE/D5W 25000/250
14 INTRAVENOUS SOLUTION INTRAVENOUS CONTINUOUS
Status: DISCONTINUED | OUTPATIENT
Start: 2018-12-04 | End: 2018-12-04

## 2018-12-04 RX ADMIN — MORPHINE SULFATE 2 MG: 4 INJECTION, SOLUTION INTRAMUSCULAR; INTRAVENOUS at 04:12

## 2018-12-04 RX ADMIN — MORPHINE SULFATE 2 MG: 4 INJECTION, SOLUTION INTRAMUSCULAR; INTRAVENOUS at 06:12

## 2018-12-04 RX ADMIN — HEPARIN SODIUM AND DEXTROSE 14 UNITS/KG/HR: 10000; 5 INJECTION INTRAVENOUS at 02:12

## 2018-12-04 RX ADMIN — Medication 3 ML: at 05:12

## 2018-12-04 NOTE — ASSESSMENT & PLAN NOTE
Stroke risk factor  First noted on EKG post hip replacement surgery, 11/11/18  No anticoagulation initiated  Rate treated with metoprolol  Patient returned to normal sinus rhythm spontaneously  -- EKG on admit showed Afib with RVR with repeat showing the same.  -- Heparin gtt discontinued.

## 2018-12-04 NOTE — PLAN OF CARE
Problem: Patient Care Overview  Goal: Plan of Care Review  Outcome: Ongoing (interventions implemented as appropriate)  Patient non responsive but awakens to vigorous stimulation. Appears comfortable; no distress noted. On 2L NC, lungs coarse and diminished at the bases. Afib on tele monitor. Patient turned Q2hr and pressure points protected. Still on heparin drip and all lab orders including APTT d'polo. Patient to continue heparin drip and on comfort measures. Fall precautions maintained, bed locked and in low positions. Side rails up and locked X2; call light within reach. Family remains at bedside.

## 2018-12-04 NOTE — PLAN OF CARE
12/04/18 1535   Post-Acute Status   Post-Acute Authorization Home Health/Hospice   Home Health/Hospice Status (Equipment to be delivered through [facility])     CHING spoke to Patty with [facility]-they are waiting for equipment to be delivered to home address. She will notify CHING once this has been done, then patient can discharge.

## 2018-12-04 NOTE — PROGRESS NOTES
"[facility]  Palliative Medicine  Progress Note    Patient Name: Apple Watson  MRN: [id number]  Admission Date: 11/27/2018  Hospital Length of Stay: 7 days  Code Status: DNR   Attending Provider: Florina Polk MD  Consulting Provider: Afsaneh Randle MD  Primary Care Physician: Tree Rolon MD  Principal Problem:Embolic stroke involving left carotid artery    Patient information was obtained from relative(s).      Assessment/Plan:     Palliative care encounter    12/04/2018 Patient is stable for transfer to [facility] Her symptoms are managed at this time with current regimen. Discussed with primary team Dr. Polk and [facility] nurse Radha  12/03/2018 Patient has had a stroke and nstemi. Her family would like her goals of care to be comfort.  Have consulted [facility] for inpatient hospice bed.  Son has "signed papers". Awaiting disposition.  She is much more comfortable since receiving Morphine 2 mg. Would use 2 mg every 2 hours as needed for shortness of breath or chest pain.  May need lorazepam for agitation and robinul for secretions.  Would discontinue heparin upon transfer to hospice along with other non comfort focused treatments.         I will sign off. Please contact us if you have any additional questions.    Subjective:     Chief Complaint:   Chief Complaint   Patient presents with    TPA transfer     from [facility]       HPI:   Ms. 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 The patient initially presented to [facility] with severe right sided weakness and aphasia with a NIH stroke scale of 22. Tpa end time was 1642.  She also had NSTEMI.  She continues to decline. We have been asked to assist with goals of care and placement.        Hospital Course:  No notes on file    Interval History: Patient resting " comfortably. Opioids and benzos have been given intermittently. Patient accepted for inpatient hospice at the [facility]. Family wants her to stay here.  Medications:  Continuous Infusions:  Scheduled Meds:   acetylcysteine 100 mg/ml (10%)  4 mL Nebulization TID WAKE    albuterol-ipratropium  3 mL Nebulization Q6H    scopolamine  1 patch Transdermal Q3 Days    sodium chloride 0.9%  3 mL Intravenous Q8H     PRN Meds:acetaminophen, albuterol-ipratropium, dextrose 50%, glucagon (human recombinant), insulin aspart U-100, lorazepam, morphine, ondansetron, sodium chloride 0.9%    Objective:     Vital Signs (Most Recent):  Temp: 98.8 °F (37.1 °C) (12/04/18 0447)  Pulse: (!) 145 (12/04/18 1137)  Resp: 20 (12/04/18 0447)  BP: (!) 92/58 (12/04/18 0447)  SpO2: (!) 94 % (12/04/18 0447) Vital Signs (24h Range):  Temp:  [95.6 °F (35.3 °C)-98.8 °F (37.1 °C)] 98.8 °F (37.1 °C)  Pulse:  [] 145  Resp:  [15-24] 20  SpO2:  [89 %-96 %] 94 %  BP: ()/(45-93) 92/58     Weight: 63.5 kg (139 lb 15.9 oz)  Body mass index is 28.6 kg/m².    Review of Symptoms  Symptom Assessment (ESAS 0-10 scale)  ESAS 0 1 2 3 4 5 6 7 8 9 10   Pain x             Dyspnea x             Anxiety              Nausea              Depression               Anorexia              Fatigue              Insomnia              Restlessness               Agitation x             CAM / Delirium _x_ --  ___+   Constipation     _x_ --  ___+   Diarrhea           _x_ --  ___+  Bowel Management Plan (BMP): Yes    Comments: npo    Pain Assessment:     OME in 24 hours: 2 mg IVP at 6 pm    Performance Status: 10    ECOG Performance Status Grade: 4 - Completely disabled    Physical Exam    Significant Labs: None  CBC:   Recent Labs   Lab 12/03/18  0543   WBC 17.97*   HGB 12.3   HCT 40.6   MCV 95   *     BMP:  No results for input(s): GLU, NA, K, CL, CO2, BUN, CREATININE, CALCIUM, MG in the last 24 hours.  LFT:  Lab Results   Component Value Date      (H) 12/03/2018    ALKPHOS 152 (H) 12/03/2018    BILITOT 0.8 12/03/2018     Albumin:   Albumin   Date Value Ref Range Status   12/03/2018 2.7 (L) 3.5 - 5.2 g/dL Final     Protein:   Total Protein   Date Value Ref Range Status   12/03/2018 6.2 6.0 - 8.4 g/dL Final     Lactic acid:   No results found for: LACTATE    Significant Imaging: None    Advanced Directives::  Living Will: No  LaPOST: No  Do Not Resuscitate Status: Yes  Medical Power of : All her children in agreement    Decision-Making Capacity: Family answered questions, Patient unable to communicate due to disease severity/cognitive impairment    Living Arrangements: Lives alone          > 50% of 25 min visit spent in chart review, face to face discussion of goals of care,  symptom assessment, coordination of care and emotional support.    Afsaneh Randle MD  Palliative Medicine  [facility]  [phone] cell

## 2018-12-04 NOTE — ASSESSMENT & PLAN NOTE
First noted on EKG post hip replacement surgery, 11/11/18, No anticoagulation was initiated at that time.   A fib noted on monitor on arrival in ED, EKG obtained confirmed afib RVR

## 2018-12-04 NOTE — PLAN OF CARE
Notified by Patty with [facility] that [facility] is en route to patient's home with equipment. Notified team for d/c orders.

## 2018-12-04 NOTE — PLAN OF CARE
CM spoke to patient's son and two daughters at the bedside in reference to discharge plan. They have decided they would like patient to go home with hospice instead of inpatient hospice at [facility]. Notified Patty with [facility] of family decision. Orders have been sent in [facility] to [facility].      12/04/18 1323   Post-Acute Status   Post-Acute Authorization Placement   Home Health/Hospice Status Patient/Family Changed Mind

## 2018-12-04 NOTE — ASSESSMENT & PLAN NOTE
Apple Watson is a 89 y.o. female with PMHx of HTN and new onset a fib who presented to OSH with L MCA syndrome. She was treated with tPA and transferred to [facility]. CTA with L ICA occlusion, patient taken to IR for possible intervention.     11/29/18: Patient doing much better, able to give thumbs up with both hands, and nodding head appropriately.   11/30/18: Patient not following commands like she was yesterday. More obtunded and lethargic.   11/30 @0300 CT head showed stable, evolving infarct.    Antithrombotics for secondary stroke prevention:  Discontinued heparin and asa as she is comfort care.      Statins for secondary stroke prevention and hyperlipidemia, if present:   Statins: None: Reason: Comfort care    Aggressive risk factor modification: HTN, A-Fib     Rehab efforts: None: Reason: Comfort measures    Diagnostics ordered/pending: None    VTE prophylaxis: None: Reason for No Pharmacological VTE Prophylaxis: Mechanical prophylaxis: Place SCDs    BP parameters: Infarct: Post tPA, SBP <180

## 2018-12-04 NOTE — PROGRESS NOTES
" [facility]  Adult Nutrition  Progress Note    SUMMARY       Recommendations    Recommendation/Intervention: 1. Recommend continuing Isosource 1.5 @ goal rate 40mL/hr. - Provides 1440kcals, 65g protein and 733mL free water. - Hold for residuals >500mL.   Goals: 1. Pt to remain confortable, tolerate PEG feeds.  Nutrition Goal Status: goal met  Communication of RD Recs: other (comment)(POC)    Reason for Assessment    Reason for Assessment: RD follow-up  Diagnosis: stroke/CVA  Relevant Medical History: HTN, a fib, neuropathy  Interdisciplinary Rounds: did not attend  General Information Comments: Pt s/p stroke on enteral feeding via NG tube. Per family's wishes, pt is on comfort care and being transferred to hospice.  Nutrition Discharge Planning: Pt to remain comfortable.    Nutrition Risk Screen    Nutrition Risk Screen: no indicators present    Nutrition/Diet History    Do you have any cultural, spiritual, Adventist conflicts, given your current situation?: none stated  Factors Affecting Nutritional Intake: NPO    Anthropometrics    Temp: 98.8 °F (37.1 °C)  Height Method: Stated  Height: 4' 10.66" (149 cm)  Height (inches): 58.66 in  Weight Method: Bed Scale  Weight: 63.5 kg (139 lb 15.9 oz)  Weight (lb): 139.99 lb  Ideal Body Weight (IBW), Female: 93.3 lb  % Ideal Body Weight, Female (lb): 150.04 lb  BMI (Calculated): 28.7  BMI Grade: 25 - 29.9 - overweight       Lab/Procedures/Meds    Pertinent Labs Reviewed: reviewed  Pertinent Labs Comments: Na 146, BUN 61, Phos 5.3, magnesium 3, Alk pohs 152, Alb 2.7, ,   Pertinent Medications Reviewed: reviewed  Pertinent Medications Comments: heparin    Physical Findings/Assessment    Overall Physical Appearance: nourished, advanced age, on oxygen therapy  Tubes: nasogastric tube  Skin: intact    Estimated/Assessed Needs    Weight Used For Calorie Calculations: 63.5 kg (139 lb 15.9 oz)  Energy Calorie Requirements (kcal): 1200  Energy Need " Method: Wilson-[place] Marlyn(PAL 1.25)  Protein Requirements: 64-76g(1.0-1.2g/kg)  Weight Used For Protein Calculations: 63.5 kg (139 lb 15.9 oz)  Fluid Requirements (mL): 1mL/kcal or per MD  RDA Method (mL): 1200     Nutrition Prescription Ordered    Current Diet Order: NPO  Nutrition Order Comments: TF to be started today  Current Nutrition Support Formula Ordered: Isosource 1.5  Current Nutrition Support Rate Ordered: 40 (ml)  Current Nutrition Support Frequency Ordered: mL/hr    Evaluation of Received Nutrient/Fluid Intake    Enteral Calories (kcal): 1440  Enteral Protein (gm): 65  Enteral (Free Water) Fluid (mL): 733  % Kcal Needs: 120  % Protein Needs: 97  I/O: -I/O, low UOP, no BM since admission  Energy Calories Required: exceed needs  Protein Required: meeting needs  Fluid Required: other (see comments)(per MD)  % Intake of Estimated Energy Needs: 75 - 100 %  % Meal Intake: NPO    Nutrition Risk    Level of Risk/Frequency of Follow-up: (f/u 2x/week)     Assessment and Plan  Nutrition Problem  Swallowing difficulty    Related to (etiology):   stroke    Signs and Symptoms (as evidenced by):   Pt with NG tube is on enteral feeds    Nutrition Diagnosis Status:   Continues    Monitor and Evaluation    Food and Nutrient Intake: energy intake, food and beverage intake, enteral nutrition intake  Food and Nutrient Adminstration: diet order, enteral and parenteral nutrition administration  Anthropometric Measurements: weight, weight change, body mass index  Biochemical Data, Medical Tests and Procedures: electrolyte and renal panel, gastrointestinal profile, glucose/endocrine profile, inflammatory profile, lipid profile  Nutrition-Focused Physical Findings: overall appearance     Nutrition Follow-Up    RD Follow-up?: Yes

## 2018-12-04 NOTE — PT/OT/SLP PROGRESS
Speech Language Pathology  Discharge    Apple Watson  MRN: [id number]    Patient not seen today secondary to transitioning to hospice.  No further St indicated at this time.     SAMY Diaz, CCC-SLP

## 2018-12-04 NOTE — ASSESSMENT & PLAN NOTE
Apple Watson is a 89 y.o. female with PMHx of HTN and new onset a fib who presented to OSH with L MCA syndrome. She was treated with tPA and transferred to [facility]. CTA with L ICA occlusion, patient taken to IR for possible intervention.     11/29/18: Patient doing much better, able to give thumbs up with both hands, and nodding head appropriately.   11/30/18: Patient not following commands like she was yesterday. More obtunded and lethargic.   11/30 @0300 CT head showed stable, evolving infarct.    Antithrombotics for secondary stroke prevention:  Discontinued heparin and asa as she is comfort care.      Statins for secondary stroke prevention and hyperlipidemia, if present:   Statins: None: Reason: Comfort care    Aggressive risk factor modification: HTN, A-Fib     Rehab efforts: None: Reason: Comfort measures    Diagnostics ordered/pending: None    VTE prophylaxis: None: Reason for No Pharmacological VTE Prophylaxis: Mechanical prophylaxis: Place SCDs    BP parameters: Infarct: Post tPA, SBP <180

## 2018-12-04 NOTE — ASSESSMENT & PLAN NOTE
Stroke risk factor  SBP <180 post tPA  BP currently at goal  -- Held all meds as she is comfort care.

## 2018-12-04 NOTE — SUBJECTIVE & OBJECTIVE
Interval History: Patient resting comfortably. Opioids and benzos have been given intermittently. Patient accepted for inpatient hospice at the [place]. Family wants her to stay here.  Medications:  Continuous Infusions:  Scheduled Meds:   acetylcysteine 100 mg/ml (10%)  4 mL Nebulization TID WAKE    albuterol-ipratropium  3 mL Nebulization Q6H    scopolamine  1 patch Transdermal Q3 Days    sodium chloride 0.9%  3 mL Intravenous Q8H     PRN Meds:acetaminophen, albuterol-ipratropium, dextrose 50%, glucagon (human recombinant), insulin aspart U-100, lorazepam, morphine, ondansetron, sodium chloride 0.9%    Objective:     Vital Signs (Most Recent):  Temp: 98.8 °F (37.1 °C) (12/04/18 0447)  Pulse: (!) 145 (12/04/18 1137)  Resp: 20 (12/04/18 0447)  BP: (!) 92/58 (12/04/18 0447)  SpO2: (!) 94 % (12/04/18 0447) Vital Signs (24h Range):  Temp:  [95.6 °F (35.3 °C)-98.8 °F (37.1 °C)] 98.8 °F (37.1 °C)  Pulse:  [] 145  Resp:  [15-24] 20  SpO2:  [89 %-96 %] 94 %  BP: ()/(45-93) 92/58     Weight: 63.5 kg (139 lb 15.9 oz)  Body mass index is 28.6 kg/m².    Review of Symptoms  Symptom Assessment (ESAS 0-10 scale)  ESAS 0 1 2 3 4 5 6 7 8 9 10   Pain x             Dyspnea x             Anxiety              Nausea              Depression               Anorexia              Fatigue              Insomnia              Restlessness               Agitation x             CAM / Delirium _x_ --  ___+   Constipation     _x_ --  ___+   Diarrhea           _x_ --  ___+  Bowel Management Plan (BMP): Yes    Comments: npo    Pain Assessment:     OME in 24 hours: 2 mg IVP at 6 pm    Performance Status: 10    ECOG Performance Status Grade: 4 - Completely disabled    Physical Exam    Significant Labs: None  CBC:   Recent Labs   Lab 12/03/18  0543   WBC 17.97*   HGB 12.3   HCT 40.6   MCV 95   *     BMP:  No results for input(s): GLU, NA, K, CL, CO2, BUN, CREATININE, CALCIUM, MG in the last 24 hours.  LFT:  Lab Results    Component Value Date     (H) 12/03/2018    ALKPHOS 152 (H) 12/03/2018    BILITOT 0.8 12/03/2018     Albumin:   Albumin   Date Value Ref Range Status   12/03/2018 2.7 (L) 3.5 - 5.2 g/dL Final     Protein:   Total Protein   Date Value Ref Range Status   12/03/2018 6.2 6.0 - 8.4 g/dL Final     Lactic acid:   No results found for: LACTATE    Significant Imaging: None    Advanced Directives::  Living Will: No  LaPOST: No  Do Not Resuscitate Status: Yes  Medical Power of : All her children in agreement    Decision-Making Capacity: Family answered questions, Patient unable to communicate due to disease severity/cognitive impairment    Living Arrangements: Lives alone

## 2018-12-04 NOTE — NURSING
Notified the stroke team of patient's Bp 78/45 manually. No further orders received since patient now DNR and on hospice. Comfort measures only at this time. Will continue to monitor.

## 2018-12-04 NOTE — ASSESSMENT & PLAN NOTE
- Palliative care consulted, appreciate recs.  - Discussed with the family regarding hospice. After a prolonged discussion, they opted for comfort care and transfer to hospice.   - Comfort care orders placed.  - Patient to be discharged with home hospice

## 2018-12-04 NOTE — PHYSICIAN QUERY
"PT Name: Apple Watson  MR #: [id number]    Physician Query Form - Heart  Condition Clarification     CDS: Aga Healy RN, CCDS       Contact information: [email or web address]  This form is a permanent document in the medical record.     Query Date: December 4, 2018    By submitting this query, we are merely seeking further clarification of documentation. Please utilize your independent clinical judgment when addressing the question(s) below.    The medical record contains the following   Indicators     Supporting Clinical Findings Location in Medical Record   X BNP - BNP 2940 (12/1)   12/2-Hospital Med Consult   X EF Recent echo (11/28) reveals EF 45%, decreased from 60% (11/12).   12/2-Hospital Med Consult   X Radiology findings - CXR shows evidence of congestion and bilateral airspace opacities suggestive of pulmonary edema   12/2-Hospital Med Consult   X Echo Results - TTE 11/29:  · Severe left atrial enlargement.  · Mildly decreased left ventricular systolic function.             The estimated ejection fraction is 45%  · Septal wall has abnormal motion.  · Left ventricular diastolic dysfunction.  · Moderate-to-severe mitral regurgitation.  · Moderate to severe tricuspid regurgitation.  · The estimated PA systolic pressure is 45.77 mm Hg    12/2-Hospital Med Consult    "Ascites" documented      "SOB" or "COTTO" documented     X "Hypoxia" documented Acute respiratory failure with hypoxia  - likely multifactorial including CHF exacerbation, aspiration pneumonia, and possible mucus plugging in the setting of thick secretions that are unable to expectorated   12/3-Hospital Med PN   X Heart Failure documented Pt presents w/ new onset evidence suggesting CHF.  Pt's family denies history of CHF. 12/2-Hospital Med Consult    "Edema" documented      Diuretics/Meds     X Treatment: Recommendations:  - Strict I/O's   - Daily Weights   - 1.5L Fluid restriction  - Continue to wean O2 as tolerated   - Start Lasix 40 IV " BID; Will titrate as needed   - Start ACEi 2.5mg daily    12/2-Hospital Med Consult   X Other:  Crackles and productive cough present on physical exam.   12/2-Hospital Med Consult     Heart failure (HF) can be acute, chronic or both. It is generally further specificed as systolic, diastolic, or combined. Lastly, it is important to identify an underlying etiology if known or suspected.     Common clues to acute exacerbation:  Rapidly progressive symptoms (w/in 2 weeks of presentation), using IV diuretics to treat, using supplemental O2, pulmonary edema on Xray, MI w/in 4 weeks, and/or BNP >500    Systolic Heart Failure: is defined as chart documentation of a left ventricular ejection fraction (LVEF) less than 40%     Diastolic Heart Failure: is defined as a left ventricular ejection fraction (LVEF) greater than 40%   +      Evidence of diastolic dysfunction on echocardiography OR    Right heart catheterization wedge pressure above 12 mm Hg OR    Left heart catheterization left ventricular end diastolic pressure 18 mm Hg or above.    References: *American Heart Association    The clinical guidelines noted below are only system guidelines, and do not replace the providers clinical judgment.     Provider, please specify the diagnosis associated with above clinical findings  [   ] Acute Systolic Heart Failure - New diagnosis.  EF < 40%  and acute HF symptoms documented  [ x  ] Acute Diastolic Heart Failure - New diagnosis.  EF > 40%  and acute HF symptoms documented  [   ] Acute Combined Systolic and Diastolic Heart Failure   [   ] Other Type of Heart Failure (please specify type): _________________________  [   ] Other (please specify): ___________________________________  [  ] Clinically Undetermined                          Please document in your progress notes daily for the duration of treatment until resolved and include in your discharge summary.

## 2018-12-04 NOTE — ASSESSMENT & PLAN NOTE
"12/04/2018 Patient is stable for transfer to [facility]. Her symptoms are managed at this time with current regimen. Discussed with primary team Dr. Polk and [facility] nurse Radha  12/03/2018 Patient has had a stroke and nstemi. Her family would like her goals of care to be comfort.  Have consulted [facility] for inpatient hospice bed.  Son has "signed papers". Awaiting disposition.  She is much more comfortable since receiving Morphine 2 mg. Would use 2 mg every 2 hours as needed for shortness of breath or chest pain.  May need lorazepam for agitation and robinul for secretions.  Would discontinue heparin upon transfer to hospice along with other non comfort focused treatments.  "

## 2018-12-04 NOTE — PHYSICIAN QUERY
PT Name: Apple Watson  MR #: [id number]     Physician Query Form - Documentation Clarification      CDS: Aga Healy RN, CCDS       Contact information: [email or web address]    This form is a permanent document in the medical record.     Query Date: December 4, 2018    By submitting this query, we are merely seeking further clarification of documentation. Please utilize your independent clinical judgment when addressing the question(s) below.    The Medical record reflects the following:    Supporting Clinical Findings Location in Medical Record     Embolic stroke involving left carotid artery  Increased WOB and decreased alertness on exam today     12/3-Vascular Neuro PN     Impression:  Continued temporal evolution of left MCA territory distribution acute infarct with continued localized mass effect and development of minimal 1-2 mm of rightward midline shift.  No evidence of new intracranial hemorrhage or hydrocephalus.     12/1-CT Head                                                                            Doctor, Please specify diagnosis or diagnoses associated with above clinical findings.  Please rafiq all that apply.    Provider Use Only     [   ] Brain Compression     [ X] Cerebral Edema     [ X] Other diagnosis (please specify) NSTEMI                                                                                                            [  ] Clinically Undetermined

## 2018-12-04 NOTE — PROGRESS NOTES
[facility]  Vascular Neurology  Comprehensive Stroke Center  Progress Note    Assessment/Plan:     * Embolic stroke involving left carotid artery    Apple Watson is a 89 y.o. female with PMHx of HTN and new onset a fib who presented to OSH with L MCA syndrome. She was treated with tPA and transferred to [facility]. CTA with L ICA occlusion, patient taken to IR for possible intervention.     11/29/18: Patient doing much better, able to give thumbs up with both hands, and nodding head appropriately.   11/30/18: Patient not following commands like she was yesterday. More obtunded and lethargic.   11/30 @0300 CT head showed stable, evolving infarct.    Antithrombotics for secondary stroke prevention:  Discontinued heparin and asa as she is comfort care.      Statins for secondary stroke prevention and hyperlipidemia, if present:   Statins: None: Reason: Comfort care    Aggressive risk factor modification: HTN, A-Fib     Rehab efforts: None: Reason: Comfort measures    Diagnostics ordered/pending: None    VTE prophylaxis: None: Reason for No Pharmacological VTE Prophylaxis: Mechanical prophylaxis: Place SCDs    BP parameters: Infarct: Post tPA, SBP <180         Palliative care encounter    - Palliative care consulted, appreciate recs.  - Discussed with the family regarding hospice. After a prolonged discussion, they opted for comfort care and transfer to hospice.   - Comfort care orders placed.  - Patient to be discharged with home hospice       Atrial fibrillation with RVR    First noted on EKG post hip replacement surgery, 11/11/18, No anticoagulation was initiated at that time.   A fib noted on monitor on arrival in ED, EKG obtained confirmed afib RVR       Received intravenous tissue plasminogen activator (tPA) in emergency department    Admitting to neuro ICU for post tPA monitoring  -- Stepped down on 12/1     Atrial fibrillation    Stroke risk factor  First noted on EKG post hip replacement  surgery, 11/11/18  No anticoagulation initiated  Rate treated with metoprolol  Patient returned to normal sinus rhythm spontaneously  -- EKG on admit showed Afib with RVR with repeat showing the same.  -- Heparin gtt discontinued.     NSTEMI (non-ST elevated myocardial infarction)    Trop >50  NSTEMI  Heparin gtt stopped and all labs discontinued as she is comfort care.     Closed fracture of neck of right femur    Recent surgery on 11/10/18     Essential hypertension    Stroke risk factor  SBP <180 post tPA  BP currently at goal  -- Held all meds as she is comfort care.          12/01/2018 On heparin gtt. Neurologically she is fluctuating but is still drowsy and not following commands. She is DNR/DNI. Will need discussion for PEG placement.  Repeat ABG shows pH of 7.50 (7.51 from yesterday) with pCO2 of 32 (down from 36) consistent with resp alkalosis. No evidence of hypoxia.   12/02/2018 Became tachypneic and tachycardic overnight requiring BiPAP. Given Lasix 40mg x1. Tube feeds held. Hospital medicine consulted. Started on ASA along with continued heparin gtt.  12/03/2018 Continues to be tachypneic with a RR of 35. On Lasix 40mg qd. Continues to be on heparin and ASA.                      Family progressing towards goals of care and hospice. NG tube removed. Received Morphine 2mg for increased work of breathing.                      Palliative care and hospice consulted.  12/04/2018 Comfort measure in place. Vitals unsteady overnight. Currently in Afib with RVR    STROKE DOCUMENTATION   Acute Stroke Times   Last Known Normal Date: 11/27/18  Last Known Normal Time: 1300  Symptom Onset Date: 11/27/18  Symptom Onset Time: 1330  Stroke Team Called Date: 11/27/18  Stroke Team Called Time: 1649  Stroke Team Arrival Date: 11/27/18  Stroke Team Arrival Time: 1650  CT Interpretation Time: 1718  Decision to Treat Time for Alteplase: (administed at OSH via telemedicine)  Decision to Treat Time for IR: 1730    [facility]  Scale:  1a. Level Of Consciousness: 3-->Responds only with reflex motor or autonomic effects or totally unresponsive, flaccid, and areflexic  1b. LOC Questions: 2-->Answers neither question correctly  1c. LOC Commands: 2-->Performs neither task correctly  2. Best Gaze: 1-->Partial gaze palsy: gaze is abnormal in one or both eyes, but forced deviation or total gaze paresis is not present  3. Visual: 0-->No visual loss  4. Facial Palsy: 1-->Minor paralysis (flattened nasolabial fold, asymmetry on smiling)  5a. Motor Arm, Left: 4-->No movement  5b. Motor Arm, Right: 4-->No movement  6a. Motor Leg, Left: 4-->No movement  6b. Motor Leg, Right: 4-->No movement  7. Limb Ataxia: 1-->Present in one limb  8. Sensory: 1-->Mild-to-moderate sensory loss: patient feels pinprick is less sharp or is dull on the affected side: or there is a loss of superficial pain with pinprick, but patient is aware of being touched  9. Best Language: 3-->Mute, global aphasia: no usable speech or auditory comprehension  10. Dysarthria: 2-->Severe dysarthria: patients speech is so slurred as to be unintelligible in the absence of or out of proportion to any dysphasia, or is mute/anarthric  11. Extinction and Inattention (formerly Neglect): 0-->No abnormality  Total (NIH Stroke Scale): 32       Modified K[place] Score: 5  [place] Coma Scale:    ABCD2 Score:    [id number] Score:   HAS -BLED Score:   ICH Score:   Hunt & Tapia Classification:      Hemorrhagic change of an Ischemic Stroke: Does this patient have an ischemic stroke with hemorrhagic changes? No     Neurologic Chief Complaint: RSW with apahsia    Subjective:     Interval History: Patient is seen for follow-up neurological assessment and treatment recommendations: Left carotid stenosis with left MCA infarct s/p thrombectomy. Complicated by NSTEMI.  Transitioned to comfort care yesterday. All labs d/polo, heparin gtt stopped.    HPI, Past Medical, Family, and Social History remains the same as  documented in the initial encounter.     Review of Systems   Unable to perform ROS: Patient nonverbal (Aphasic)     Scheduled Meds:   acetylcysteine 100 mg/ml (10%)  4 mL Nebulization TID WAKE    albuterol-ipratropium  3 mL Nebulization Q6H    scopolamine  1 patch Transdermal Q3 Days    sodium chloride 0.9%  3 mL Intravenous Q8H     Continuous Infusions:  PRN Meds:acetaminophen, albuterol-ipratropium, dextrose 50%, glucagon (human recombinant), insulin aspart U-100, lorazepam, morphine, ondansetron, sodium chloride 0.9%    Objective:     Vital Signs (Most Recent):  Temp: 98.9 °F (37.2 °C) (12/04/18 1256)  Pulse: (!) 134 (12/04/18 1256)  Resp: 18 (12/04/18 1256)  BP: (!) 122/58 (12/04/18 1256)  SpO2: (!) 93 % (12/04/18 1256)  BP Location: Right arm    Vital Signs Range (Last 24H):  Temp:  [95.6 °F (35.3 °C)-98.9 °F (37.2 °C)]   Pulse:  []   Resp:  [15-20]   BP: ()/(45-67)   SpO2:  [89 %-96 %]   BP Location: Right arm    Physical Exam   Eyes:   Right anisocoria   Neurological: She is unresponsive.   Nursing note and vitals reviewed.      Neurological Exam:   LOC: obtunded  Language: Global aphasia  Pupils (CN II, III): Anisocoria Side: R pupil 5 mm Reactive: No   Motor: Arm left  Paresis: 1/5  Leg left  Plegia 0/5  Arm right  Plegia 0/5  Leg right Plegia 0/5    Laboratory:  BMP:   Recent Labs   Lab 12/03/18  0543   *   K 4.2      CO2 23   BUN 61*   CREATININE 1.1   CALCIUM 8.9     CBC:   Recent Labs   Lab 12/03/18  0543   WBC 17.97*   RBC 4.26   HGB 12.3   HCT 40.6   *   MCV 95   MCH 28.9   MCHC 30.3*     Lipid Panel:   Recent Labs   Lab 11/28/18  0100   CHOL 147   LDLCALC 83.2   HDL 46   TRIG 89     Coagulation:   Recent Labs   Lab 12/03/18  0543   INR 1.5*   APTT 53.8*     Platelet Aggregation Study: No results for input(s): PLTAGG, PLTAGINTERP, PLTAGREGLACO, ADPPLTAGGREG in the last 168 hours.  Hgb A1C:   Recent Labs   Lab 11/28/18  0100   HGBA1C 5.0     TSH:   Recent Labs   Lab  11/28/18  0100   TSH 0.834       Diagnostic Results     Brain imaging:  CT Head. Date: 11/30/18 @0300  Continued temporal evolution of left MCA territory distribution acute infarct.  No evidence of midline shift or new large parenchymal hemorrhage.        MRI brain 11/28/18:  -Moderate size regions of signal abnormality left MCA territory compatible with recent infarction.   -Please note small component of hemorrhage within the left mesial temporal component of infarction.  -In addition there is superimposed small region of recent infarction in the right inferior frontal lobe.  -Generalized cerebral volume loss with scattered T2 FLAIR signal hyperintensity supratentorial white matter and lilly concerning for underlying chronic ischemic change.  -No evidence for hydrocephalus with left middle cranial fossa probable rack node cyst         CT Head. Date: 11/27/18  No CT evidence of acute intracranial abnormality.     Vessel Imaging:  CTA Multiphase. Date: 11/27/18  Partial opacification of the left cervical ICA, without meaningful opacification of the ICA distally or intracranially, findings suggests thrombus, either within the vessel itself, or the carotid terminus.  There is some flow within the distal M2 and M3 branches on the left, with improved flow on phase 2 and face 3 however no meaningful flow is identified within the M1 segment on the left.  These findings likely correlate with vague hypoattenuation seen within the region of the left basal ganglia and partially left caudate/subinsular region.    No additional regions of high-grade stenosis or occlusion, please see above for full details.        Cardiac Evaluation:   TTE 11/28/18:  · Severe left atrial enlargement.  · Mildly decreased left ventricular systolic function. The estimated ejection fraction is 45%  · Septal wall has abnormal motion.  · Left ventricular diastolic dysfunction.  · Moderate-to-severe mitral regurgitation.  · Moderate to severe tricuspid  regurgitation.  · The estimated PA systolic pressure is 45.77 mm Hg  · Normal central venous pressure (3 mm Hg).          West Negro MD  Comprehensive Stroke Center  Department of Vascular Neurology   [facility]

## 2018-12-04 NOTE — SUBJECTIVE & OBJECTIVE
Neurologic Chief Complaint: RSW with apahsia    Subjective:     Interval History: Patient is seen for follow-up neurological assessment and treatment recommendations: Left carotid stenosis with left MCA infarct s/p thrombectomy. Complicated by NSTEMI.  Transitioned to comfort care yesterday. All labs d/polo, heparin gtt stopped.    HPI, Past Medical, Family, and Social History remains the same as documented in the initial encounter.     Review of Systems   Unable to perform ROS: Patient nonverbal (Aphasic)     Scheduled Meds:   acetylcysteine 100 mg/ml (10%)  4 mL Nebulization TID WAKE    albuterol-ipratropium  3 mL Nebulization Q6H    scopolamine  1 patch Transdermal Q3 Days    sodium chloride 0.9%  3 mL Intravenous Q8H     Continuous Infusions:  PRN Meds:acetaminophen, albuterol-ipratropium, dextrose 50%, glucagon (human recombinant), insulin aspart U-100, lorazepam, morphine, ondansetron, sodium chloride 0.9%    Objective:     Vital Signs (Most Recent):  Temp: 98.9 °F (37.2 °C) (12/04/18 1256)  Pulse: (!) 134 (12/04/18 1256)  Resp: 18 (12/04/18 1256)  BP: (!) 122/58 (12/04/18 1256)  SpO2: (!) 93 % (12/04/18 1256)  BP Location: Right arm    Vital Signs Range (Last 24H):  Temp:  [95.6 °F (35.3 °C)-98.9 °F (37.2 °C)]   Pulse:  []   Resp:  [15-20]   BP: ()/(45-67)   SpO2:  [89 %-96 %]   BP Location: Right arm    Physical Exam   Eyes:   Right anisocoria   Neurological: She is unresponsive.   Nursing note and vitals reviewed.      Neurological Exam:   LOC: obtunded  Language: Global aphasia  Pupils (CN II, III): Anisocoria Side: R pupil 5 mm Reactive: No   Motor: Arm left  Paresis: 1/5  Leg left  Plegia 0/5  Arm right  Plegia 0/5  Leg right Plegia 0/5    Laboratory:  BMP:   Recent Labs   Lab 12/03/18  0543   *   K 4.2      CO2 23   BUN 61*   CREATININE 1.1   CALCIUM 8.9     CBC:   Recent Labs   Lab 12/03/18  0543   WBC 17.97*   RBC 4.26   HGB 12.3   HCT 40.6   *   MCV 95   MCH 28.9    MCHC 30.3*     Lipid Panel:   Recent Labs   Lab 11/28/18  0100   CHOL 147   LDLCALC 83.2   HDL 46   TRIG 89     Coagulation:   Recent Labs   Lab 12/03/18  0543   INR 1.5*   APTT 53.8*     Platelet Aggregation Study: No results for input(s): PLTAGG, PLTAGINTERP, PLTAGREGLACO, ADPPLTAGGREG in the last 168 hours.  Hgb A1C:   Recent Labs   Lab 11/28/18 0100   HGBA1C 5.0     TSH:   Recent Labs   Lab 11/28/18  0100   TSH 0.834       Diagnostic Results     Brain imaging:  CT Head. Date: 11/30/18 @0300  Continued temporal evolution of left MCA territory distribution acute infarct.  No evidence of midline shift or new large parenchymal hemorrhage.        MRI brain 11/28/18:  -Moderate size regions of signal abnormality left MCA territory compatible with recent infarction.   -Please note small component of hemorrhage within the left mesial temporal component of infarction.  -In addition there is superimposed small region of recent infarction in the right inferior frontal lobe.  -Generalized cerebral volume loss with scattered T2 FLAIR signal hyperintensity supratentorial white matter and lilly concerning for underlying chronic ischemic change.  -No evidence for hydrocephalus with left middle cranial fossa probable rack node cyst         CT Head. Date: 11/27/18  No CT evidence of acute intracranial abnormality.     Vessel Imaging:  CTA Multiphase. Date: 11/27/18  Partial opacification of the left cervical ICA, without meaningful opacification of the ICA distally or intracranially, findings suggests thrombus, either within the vessel itself, or the carotid terminus.  There is some flow within the distal M2 and M3 branches on the left, with improved flow on phase 2 and face 3 however no meaningful flow is identified within the M1 segment on the left.  These findings likely correlate with vague hypoattenuation seen within the region of the left basal ganglia and partially left caudate/subinsular region.    No additional regions  of high-grade stenosis or occlusion, please see above for full details.        Cardiac Evaluation:   TTE 11/28/18:  · Severe left atrial enlargement.  · Mildly decreased left ventricular systolic function. The estimated ejection fraction is 45%  · Septal wall has abnormal motion.  · Left ventricular diastolic dysfunction.  · Moderate-to-severe mitral regurgitation.  · Moderate to severe tricuspid regurgitation.  · The estimated PA systolic pressure is 45.77 mm Hg  · Normal central venous pressure (3 mm Hg).

## 2018-12-04 NOTE — DISCHARGE SUMMARY
[facility]  Vascular Neurology  Comprehensive Stroke Center  Discharge Summary     Summary:     Admit Date: 11/27/2018  4:52 PM    Discharge Date and Time:  12/04/2018 4:46 PM    Attending Physician: Florina Polk MD     Discharge Provider: West Negro MD    History of Present Illness: Apple Watson is a 89 y.o. female with PMHx who presented as transfer from [facility] with L MCA syndrome. Patient was treated with tPA. Hyperdense MCA sign seen on CT head and patient transferred for possible intervention. On arrival, patient without improvement in symptoms.       At home taking a nap before home health therapy. Daughter woke her up and noted RSW and patient was nonverbal. Patient would not stand up and patient would not respond. Family states patient had a recent surgery on 11/10 for hip replacement and patient had abnormal heart rhythm afterwards. Patient family unaware of type of rhythm.    Hospital Course (synopsis of major diagnoses, care, treatment, and services provided during the course of the hospital stay): 12/01/2018 On heparin gtt. Neurologically she is fluctuating but is still drowsy and not following commands. She is DNR/DNI. Will need discussion for PEG placement.  Repeat ABG shows pH of 7.50 (7.51 from yesterday) with pCO2 of 32 (down from 36) consistent with resp alkalosis. No evidence of hypoxia.   12/02/2018 Became tachypneic and tachycardic overnight requiring BiPAP. Given Lasix 40mg x1. Tube feeds held. Hospital medicine consulted. Started on ASA along with continued heparin gtt.  12/03/2018 Continues to be tachypneic with a RR of 35. On Lasix 40mg qd. Continues to be on heparin and ASA.                      Family progressing towards goals of care and hospice. NG tube removed. Received Morphine 2mg for increased work of breathing.                      Palliative care and hospice consulted.  12/04/2018 Comfort measure in place. Vitals unsteady  overnight. Currently in Afib with RVR                     Discharged home with hospice.    Stroke Etiology: Evident Intracranial Supra-Aortic Large Artery Atherosclerosis (SHEA)    STROKE DOCUMENTATION   Acute Stroke Times   Last Known Normal Date: 11/27/18  Last Known Normal Time: 1300  Symptom Onset Date: 11/27/18  Symptom Onset Time: 1330  Stroke Team Called Date: 11/27/18  Stroke Team Called Time: 1649  Stroke Team Arrival Date: 11/27/18  Stroke Team Arrival Time: 1650  CT Interpretation Time: 1718  Decision to Treat Time for Alteplase: (administed at OSH via telemedicine)  Decision to Treat Time for IR: 1732     NIH Scale:  1a. Level Of Consciousness: 3-->Responds only with reflex motor or autonomic effects or totally unresponsive, flaccid, and areflexic  1b. LOC Questions: 2-->Answers neither question correctly  1c. LOC Commands: 2-->Performs neither task correctly  2. Best Gaze: 1-->Partial gaze palsy: gaze is abnormal in one or both eyes, but forced deviation or total gaze paresis is not present  3. Visual: 0-->No visual loss  4. Facial Palsy: 1-->Minor paralysis (flattened nasolabial fold, asymmetry on smiling)  5a. Motor Arm, Left: 4-->No movement  5b. Motor Arm, Right: 4-->No movement  6a. Motor Leg, Left: 4-->No movement  6b. Motor Leg, Right: 4-->No movement  7. Limb Ataxia: 1-->Present in one limb  8. Sensory: 1-->Mild-to-moderate sensory loss: patient feels pinprick is less sharp or is dull on the affected side: or there is a loss of superficial pain with pinprick, but patient is aware of being touched  9. Best Language: 3-->Mute, global aphasia: no usable speech or auditory comprehension  10. Dysarthria: 2-->Severe dysarthria: patients speech is so slurred as to be unintelligible in the absence of or out of proportion to any dysphasia, or is mute/anarthric  11. Extinction and Inattention (formerly Neglect): 0-->No abnormality  Total (NIH Stroke Scale): 32        Modified Cuco Score: 5  [place] Coma  Scale:    ABCD2 Score:    [id number] Score:   HAS -BLED Score:   ICH Score:   Hunt & Tapia Classification:       Assessment/Plan:     Diagnostic Results:      Brain imaging:  CT Head. Date: 11/30/18 @0300  Continued temporal evolution of left MCA territory distribution acute infarct.  No evidence of midline shift or new large parenchymal hemorrhage.        MRI brain 11/28/18:  -Moderate size regions of signal abnormality left MCA territory compatible with recent infarction.   -Please note small component of hemorrhage within the left mesial temporal component of infarction.  -In addition there is superimposed small region of recent infarction in the right inferior frontal lobe.  -Generalized cerebral volume loss with scattered T2 FLAIR signal hyperintensity supratentorial white matter and lilly concerning for underlying chronic ischemic change.  -No evidence for hydrocephalus with left middle cranial fossa probable rack node cyst         CT Head. Date: 11/27/18  No CT evidence of acute intracranial abnormality.     Vessel Imaging:  CTA Multiphase. Date: 11/27/18  Partial opacification of the left cervical ICA, without meaningful opacification of the ICA distally or intracranially, findings suggests thrombus, either within the vessel itself, or the carotid terminus.  There is some flow within the distal M2 and M3 branches on the left, with improved flow on phase 2 and face 3 however no meaningful flow is identified within the M1 segment on the left.  These findings likely correlate with vague hypoattenuation seen within the region of the left basal ganglia and partially left caudate/subinsular region.    No additional regions of high-grade stenosis or occlusion, please see above for full details.        Cardiac Evaluation:   TTE 11/28/18:  · Severe left atrial enlargement.  · Mildly decreased left ventricular systolic function. The estimated ejection fraction is 45%  · Septal wall has abnormal motion.  · Left  ventricular diastolic dysfunction.  · Moderate-to-severe mitral regurgitation.  · Moderate to severe tricuspid regurgitation.  · The estimated PA systolic pressure is 45.77 mm Hg  · Normal central venous pressure (3 mm Hg).         Interventions: IV t-PA, Thrombectomy    Complications: MI  (NSTEMI)    Disposition: Hospice/Home    Final Active Diagnoses:    Diagnosis Date Noted POA    PRINCIPAL PROBLEM:  Embolic stroke involving left carotid artery [I63.132] 11/27/2018 Yes    Acute respiratory failure with hypoxia [J96.01] 12/03/2018 No    Palliative care encounter [Z51.5] 12/03/2018 Not Applicable    Congestive heart failure (CHF) [I50.9] 12/02/2018 Yes    Atrial fibrillation with RVR [I48.91] 11/28/2018 Yes    Ventricular fibrillation [I49.01]  Unknown    Atrial fibrillation [I48.91] 11/27/2018 Unknown    Received intravenous tissue plasminogen activator (tPA) in emergency department [Z92.82] 11/27/2018 Not Applicable    Cerebrovascular accident (CVA) [I63.9] 11/27/2018 Yes    Elevated troponin [R74.8]  Yes    NSTEMI (non-ST elevated myocardial infarction) [I21.4] 11/11/2018 Yes    Closed fracture of neck of right femur [S72.001A] 11/09/2018 Yes    Essential hypertension [I10] 02/10/2016 Yes      Problems Resolved During this Admission:     * Embolic stroke involving left carotid artery    Apple Watson is a 89 y.o. female with PMHx of HTN and new onset a fib who presented to OSH with L MCA syndrome. She was treated with tPA and transferred to [facility]. CTA with L ICA occlusion, patient taken to IR for possible intervention.     11/29/18: Patient doing much better, able to give thumbs up with both hands, and nodding head appropriately.   11/30/18: Patient not following commands like she was yesterday. More obtunded and lethargic.   11/30 @0300 CT head showed stable, evolving infarct.    Antithrombotics for secondary stroke prevention:  Discontinued heparin and asa as she is comfort care.      Statins for  secondary stroke prevention and hyperlipidemia, if present:   Statins: None: Reason: Comfort care    Aggressive risk factor modification: HTN, A-Fib     Rehab efforts: None: Reason: Comfort measures    Diagnostics ordered/pending: None    VTE prophylaxis: None: Reason for No Pharmacological VTE Prophylaxis: Mechanical prophylaxis: Place SCDs    BP parameters: Infarct: Post tPA, SBP <180         Palliative care encounter    - Palliative care consulted, appreciate recs.  - Discussed with the family regarding hospice. After a prolonged discussion, they opted for comfort care and transfer to hospice.   - Comfort care orders placed.  - Patient to be discharged with home hospice       Atrial fibrillation with RVR    First noted on EKG post hip replacement surgery, 11/11/18, No anticoagulation was initiated at that time.   A fib noted on monitor on arrival in ED, EKG obtained confirmed afib RVR       Received intravenous tissue plasminogen activator (tPA) in emergency department    Admitting to neuro ICU for post tPA monitoring  -- Stepped down on 12/1     Atrial fibrillation    Stroke risk factor  First noted on EKG post hip replacement surgery, 11/11/18  No anticoagulation initiated  Rate treated with metoprolol  Patient returned to normal sinus rhythm spontaneously  -- EKG on admit showed Afib with RVR with repeat showing the same.  -- Heparin gtt discontinued.     NSTEMI (non-ST elevated myocardial infarction)    Trop >50  NSTEMI  Heparin gtt stopped and all labs discontinued as she is comfort care.     Closed fracture of neck of right femur    Recent surgery on 11/10/18     Essential hypertension    Stroke risk factor  SBP <180 post tPA  BP currently at goal  -- Held all meds as she is comfort care.         Recommendations:     Post-discharge complication risks: Skin breakdown    Stroke Education given to: family    Discharge Plan: Comfort Care    Disposition: Home with hospice    Follow Up:  Follow-up Information      [facility].    Specialties:  Hospice and Palliative Medicine, Hospice Services  Why:  Company will contact you to arrange a time to see patient.  Contact information:  [address] [address] [phone]                   Patient Instructions:   No discharge procedures on file.    Medications:  Reconciled Home Medications:      Medication List      STOP taking these medications    amitriptyline 10 MG tablet  Commonly known as:  ELAVIL     calcium carbonate-vit D3-min 600 mg (1,500 mg)-400 unit Chew     gabapentin 300 MG capsule  Commonly known as:  NEURONTIN     guaifenesin-codeine 100-10 mg/5 ml  mg/5 mL syrup  Commonly known as:  TUSSI-ORGANIDIN NR     HYDROcodone-acetaminophen  mg per tablet  Commonly known as:  NORCO     verapamil 240 MG CR tablet  Commonly known as:  CALAN-SR     vitamin A 8000 UNIT capsule     VITAMIN B-12 250 MCG tablet  Generic drug:  cyanocobalamin     VITAMIN D2 50,000 unit Cap  Generic drug:  ergocalciferol            West Negro MD  Comprehensive Stroke Center  Department of Vascular Neurology   [facility]

## 2018-12-04 NOTE — PLAN OF CARE
Patient discharging home today with [facility]. Patient's family in agreement with discharge plan.  [facility] stretcher has been arranged for 6:30pm to allow for equipment to be delivered to home.    If transportation has not arrived by 7:30pm, please contact extension [phone] for an update.      12/04/18 1657   Final Note   Assessment Type Final Discharge Note   Anticipated Discharge Disposition HospiceHome  ([facility])   Right Care Referral Info   Post Acute Recommendation Other

## 2018-12-06 ENCOUNTER — TELEPHONE (OUTPATIENT)
Dept: FAMILY MEDICINE | Facility: CLINIC | Age: 83
End: 2018-12-06

## 2018-12-06 NOTE — TELEPHONE ENCOUNTER
----- Message from Lisa Del Valle sent at 12/6/2018 12:01 PM CST -----  Contact: Liana (daughter)/ [phone]  Daughter called in to let you know patient passed away. Before patient passed away she asked to let you know.    Please advise.

## 2018-12-11 NOTE — PT/OT/SLP DISCHARGE
Physical Therapy Discharge Summary    Name: Apple Watson  MRN: [id number]   Principal Problem: Embolic stroke involving left carotid artery     Patient Discharged from acute Physical Therapy on 12/4/2018.  Please refer to prior PT noted date on 12/1/2018 for functional status.     Assessment:     Family decided to terminate therapy and pursue hospice    Objective:     GOALS:   Multidisciplinary Problems     Physical Therapy Goals        Problem: Physical Therapy Goal    Goal Priority Disciplines Outcome Goal Variances Interventions   Physical Therapy Goal     PT, PT/OT Ongoing (interventions implemented as appropriate)     Description:    Goals to be met by 12/9/2018    1. Pt will perform rolling to the R and L with moderate assistance.   2. Pt will perform supine to sit from both sides of the bed with moderate assistance.  3. Pt will perform sit to supine with moderate assistance.  4. Pt will perform sit to stand transfers with max assistance.    5. Pt will perform bed <> chair transfers with max assistance.  6. Pt will perform gait x 10 feet with max assistance using RW.  7. Pt will sit EOB x 5 minutes with min assistance and no LOB to prepare for functional tasks in sitting.                     Reasons for Discontinuation of Therapy Services  Transfer to alternate level of care.      Plan:     Patient Discharged to: Palliative Care/Hospice.    Heather Chino, PT  12/11/2018

## 2025-04-23 NOTE — PROGRESS NOTES
EKG completed and given to Dr Adams   Name: Jos Hawk      : 1958      MRN: [id number]  Encounter Provider: Debra Leyva DO  Encounter Date: 2025   Enc[facility]  :  Assessment & Plan  Leg weakness, bilateral  Tony is a 66-year-old patient with spinal disease who was stable until the last week.  He had been on baclofen but the dose had been increased to 20 mg 3 times a day last Thursday.  He was able to ambulate 20 feet with assistance however after that he noted more weakness of the right leg, increase in stiffness and on Tuesday he was unable to stand.  He was seen by physiatry at [facility] yesterday who then has placed him on a baclofen taper.  He was felt that baclofen excessive doses may have may have contributed to his symptomatology.  He also reports increasing weakness of the left upper extremity.  We discussed that if symptoms do not improve with tapering these doses he may require imaging studies in the form of MRI of the cervical, thoracic and lumbar spines.    We discussed the spasticity may increase since he will be tapering off of baclofen and he may be able to utilize Zanaflex as lower doses during the day higher doses are relatively contraindicated due to episodes of hypotension.  He does have Zanaflex available.  However he should contact physiatry for their recommendations.         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  He was seen at [facility] who evaluated him and sent him a message.  We discussed potential of a CSF examination to determine any types of etiologies of the neuropathy and/or a myelopathy such as HTLV 1.  A CSF examination should be performed only after f other medical problems are controlled in order to prevent additional infections.  The CSF examination will be ordered by [facility]  physician and subsequently performed at the [facility]'s site.  I have informed him that this is performed under  fluoroscopy.      Today prior to visit I did review Dr. Solares's note and the [facility] records.                   History of Present Illness           This is a 66 -year-old patient with spinal disease.  He was last evaluated here in February 2025.  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  At the last visit we had a long discussion regarding the use of prednisone.  After a long discussion he was placed on prednisone 10 mg a day with his knowledge about the risk of side effects.    He was stable and has been able to stand up with assistance and ambulate 200 feet.  He was subsequently seen by Dr. MICAELA [facility] where a CAT scan showed fracture of the right vertical daniel between L3-L4 L4-L5 and screws compatible with hardware loosening.  It was felt that he had severe spasticity and referred to the spasticity clinic.  He has been seen by the vascular Robert Jj and started on baclofen approximately February and the dose was increased to 20 mg 3 times a days several days prior  In the last week he has noted problems with movement, weakness in his legs worse on the right than the left.  He was unable to ambulate( in or out of a pool ), with assistance and with a walker.  On Tuesday.  Subsequently was seen and the decision was made to taper him off the baclofen.  It was felt that the this baclofen may be resulting in the excessive weakness.  Dr. Buitrago also discussed the need for a baclofen pump.    He is currently on 10 tizanidine 8 mg at bedtime and 8 mg at 1 in the morning.  He does have Zanaflex available at smaller doses at home.  However in the past it did result in side  effects during the day.  Previous MRI of the brain in November 2024 did show mild microangiopathic changes.    Since his last visit he was also evaluated at [facility] by Dr. Solares who clearly felt he had a myelopathic examination with severe stiffness and spasticity and impaired proprioception.  EMG/NCV procedure was suggested.      He did undergo an EMG/NCV procedure on 4/8/2025 of the lower extremities and limited right upper extremity.  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  A ultrasound of the cubital tunnel was suggested the patient and Janelle his 8 did provide me a copy of the response from Dr. Solares.  He suggested acute on chronic cervical spinal disease with myelomalacia and a polyneuropathy.  He is suggested a potential lumbar puncture to evaluate other etiologies neuropathy.              He does have a history of chronic cervical C3-C4 myelomalacia.  He is status post decompression in 2016 for left leg weakness he has a history of lumbar spinal stenosis with cauda equina syndrome status post multiple lumbar surgeries including L2-S1 spinal fusion in 2016 and anterior L4-L5 decompression with subsequent fusion.  He was doing well until approximately 2 years ago and has had gradual decline in his overall function.        Extensive surgical evaluation  1995 Laminectomy,   1998 Neck surgery, 2  2001 LSPINE fusion,   2016 Spinal fusion, 2  2018 SCS implanted        He does have an autonomic dysfunction is utilizing midodrine he has access to midodrine to take on a very intermittent basis.  This has helped his blood pressure.                    PERTINENT IMAGING (independently reviewed and interpreted by me unless otherwise noted)  CT myelogram 2/24/2025: results not found    CT C spine 2/6/25  Impression  1. Postoperative changes  of C3-C6 decompressive laminectomy with right-sided unilateral laminectomy at C7 and T1.  2. There is mild diffuse thinning of the cervical spinal cord with focal severe thinning at the C3-4 level with cord measures 3 mm in AP dimension. Findings compatible with remote prior injury/insult.  3. Spondylotic degenerative changes are noted resulting in moderate to severe left and mild right osseous foraminal narrowing at C3-4. Mild foraminal narrowing elsewhere as described.    CT thoracic Spine 2/6/25  Impression:Postoperative changes of posterior fusion L3-S1. Fracture of the right vertical daniel between L3 and L4 as well as L4 and L5. Lucency surrounds the bilateral S1 screws compatible with hardware loosening.   Subacute to chronic superior endplate fracture of L2 with slight retropulsion resulting in mild central stenosis.   Mild degenerative changes are seen in the thoracic spine.     MRI L spine 1/8/25  Stable postoperative changes status post laminectomy from L2-L5 and and fusion from L2-S1.   No significant canal stenosis.   Evaluation of the foramen is again limited due to artifact.   Given limitations due to artifact from hardware and MRI protocol restrictions due to the thoracic spinal cord stimulator, consider CT for further evaluation of the foramen.     MRI T Spine 11/13/24    MRI C spine 11/11/24  1. Multilevel cervical spondylosis in this patient status post multilevel   decompression. There is severe neural foraminal narrowing at several levels as discussed above.   2. Myelomalacia with diminutive cord caliber at C3-C4 and C5-   3. No significant spinal canal enhancement appreciated.   4. STIR signal hyperintensity (edema) affecting the left C4-C5 facet joint,   typical of active degenerative facet arthropathy/inflammation.     MRI brain 11/6/24  FINDINGS:   No acute infarction on diffusion. No evidence of hemorrhage on the gradient echo   sequence. No extra axial collection. No mass or edema. There is  minimal T2/FLAIR hyperintensity involving the periventricular white matter likely reflect microangiopathy. No abnormal enhancement.    EMG 4/2024:   EMG/NCS 4/10/2024: This is an abnormal electrodiagnostic study of the bilateral lower extremities by EMG/NCS with limited RUE evaluation by NCS alone as part of polyneuropathy screen. 1) There is electrodiagnostic evidence of a large fiber sensorimotor axonopathic polyneuropathy. 2) There is electrodiagnostic evidence of an acute on chronic right L5-S1 lumbosacral radiculopathy on EMG monopolar needle study with acute 3+ PSWs/fibs in right L5-S1 gluteus medius with neuropathic large amplitude MUAPS of normal duration with reduced recruitment in right L5-S1 distribution in multiple muscles. There is electrodiagnostic evidence of a chronic non-active left L5-S1 lumbosacral radiculopathy on EMG monopolar needle study. 3) There is no electrodiagnostic evidence of peroneal nerve entrapment at fibular heads bilaterally. 4) Careful clinical correlation is warranted. Note conventional EMG and nerve conduction studies do NOT test small sensory fibers which when irritated may underline pain and paresthesias arising from â??namedâ? peripheral nerves, myofascial structures and/or sensory roots.     EMG 7/2019 no results    CT C spine 10/2/2020  IMPRESSION:   1. Postsurgical changes.   2. Spondylosis.   3. Spinal cord atrophy.   4. No central stenosis.   5. Bilateral foraminal stenosis at multiple levels. See above.                       Mri of c psine November 2024      MRI of c spine Motion degraded examination. There is mild reversal of expected cervical   lordosis with trace retrolisthesis of C3 over C4 and trace anterolisthesis of C4   over C5. There is multilevel intervertebral disc desiccation/height loss.   Degenerative signal changes are present throughout the cervical spine. There is   suggestion of STIR signal hyperintensity affecting the left C4-C5 facet joint,   typical  of active degenerative facet arthropathy/inflammation. (Seen on series 4   image 2).     There is no abnormal spinal canal enhancement appreciated.     There is myelomalacia with loss of the cord caliber at C3-C4 and again at C5-C6.     Multilevel cervical spondylosis is present in this patient status post   decompression from the levels of C3-C6 with level by level discussion below:   C2-C3: No significant posterior disc abnormality. Uncovertebral arthropathy and   facet arthropathy. Mild spinal canal narrowing and mild left neural foraminal   narrowing. Severe right neural foraminal narrowing.   C3-C4: Disc osteophyte complex eccentric the left, uncovertebral arthropathy,   and facet arthropathy. No significant spinal canal narrowing and mild right   neural foraminal narrowing. Severe left neural foraminal narrowing.   C4-C5: Disc osteophyte complex, uncovertebral arthropathy, and facet   arthropathy. No significant spinal canal narrowing and moderate left greater   than right neural foraminal narrowing.   C5-C6: Disc osteophyte complex, uncovertebral arthropathy, and facet   arthropathy. Moderate spinal canal narrowing and severe bilateral neural   foraminal narrowing, left greater than right.   C6-C7: No significant posterior disc abnormality. Uncovertebral arthropathy and   facet arthropathy. No significant spinal canal narrowing and mild right neural   foraminal narrowing.   C7-T1: Disc protrusion, uncovertebral arthropathy, and facet arthropathy. Mild   spinal canal narrowing and mild left neural foraminal narrowing. Severe right   neural foraminal narrowing.          Review of Systems   Constitutional:  Negative for appetite change, fatigue and fever.   HENT:  Positive for voice change. Negative for hearing loss, tinnitus and trouble swallowing.    Eyes: Negative.  Negative for photophobia, pain and visual disturbance.   Respiratory:  Positive for shortness of breath.    Cardiovascular:  Positive for  palpitations.   Gastrointestinal: Negative.  Negative for nausea and vomiting.   Endocrine: Negative.  Negative for cold intolerance.   Genitourinary:  Positive for frequency and urgency. Negative for dysuria.   Musculoskeletal:  Positive for back pain, gait problem, neck pain and neck stiffness. Negative for myalgias.   Skin: Negative.  Negative for rash.   Allergic/Immunologic: Negative.    Neurological:  Positive for dizziness, tremors, weakness, light-headedness and numbness. Negative for syncope, facial asymmetry, speech difficulty and headaches.   Hematological:  Bruises/bleeds easily.   Psychiatric/Behavioral:  Positive for sleep disturbance. Negative for confusion and hallucinations.    All other systems reviewed and are negative.   I have personally reviewed the MA's review of systems and made changes as necessary.    Medical History Reviewed by provider this encounter:     .  Current Outpatient Medications on File Prior to Visit   Medication Sig Dispense Refill    Cholecalciferol 50 MCG (2000 UT) TABS 50 mcg      clobetasol (TEMOVATE) 0.05 % external solution       diazepam (VALIUM) 2 mg tablet 1 po 30 mins prior to procedure . Pt can repeat in 2 hours if necessary 10 tablet 0    fluocinonide (LIDEX) 0.05 % cream       folic acid (FOLVITE) 1 mg tablet Take 1 tablet (1mg) by mouth Once Daily.      glycerin-hypromellose- (ARTIFICIAL TEARS) 0.2-0.2-1 % SOLN Apply to eye      ibuprofen (MOTRIN) 400 mg tablet Take 1 tablet (400 mg total) by mouth every 6 (six) hours as needed for mild pain  0    ketoconazole (NIZORAL) 2 % shampoo       lidocaine (LIDODERM) 5 % Apply topically      predniSONE 10 mg tablet Take 1 tablet (10 mg total) by mouth daily 30 tablet 5    sildenafil (VIAGRA) 100 mg tablet Take 100 mg by mouth As directed      tamsulosin (FLOMAX) 0.4 mg Take 1 capsule by mouth daily at bedtime      testosterone cypionate (DEPO-TESTOSTERONE) 200 mg/mL SOLN       torsemide (DEMADEX) 10 mg tablet        "Elastic Bandages & Supports (Wrap/Multipurpose 2.75\"x21.4yd) MISC Use in the morning Please dispense lymphedema wrap and wrap from hand up to the proximal shoulder (Patient not taking: Reported on 4/23/2025) 1 each 0    hypromellose (GENTEAL SEVERE) 0.3 % ophthalmic gel Apply to eye (Patient not taking: Reported on 8/14/2024)      naloxone (NARCAN) 4 mg/0.1 mL nasal spray Administer 1 spray into a nostril. If no response after 2-3 minutes, give another dose in the other nostril using a new spray. 1 each 1    sulfamethoxazole-trimethoprim (BACTRIM DS) 800-160 mg per tablet Take 1 tablet by mouth 2 (two) times a day (Patient not taking: Reported on 8/14/2024)       No current facility-administered medications on file prior to visit.      Social History     Tobacco Use    Smoking status: Never    Smokeless tobacco: Never   Vaping Use    Vaping status: Never Used   Substance and Sexual Activity    Alcohol use: Not Currently     Alcohol/week: 4.0 - 7.0 standard drinks of alcohol     Types: 4 - 7 Glasses of wine per week     Comment: Just weekends    Drug use: No    Sexual activity: Yes     Partners: Female     Birth control/protection: Other     Comment: vasectomy        Objective   /78 (BP Location: Right arm, Patient Position: Sitting, Cuff Size: Standard)   Pulse 74   Temp 97.7 °F (36.5 °C) (Temporal)   Resp 14   Ht 6' 1\" (1.854 m)   Wt 107 kg (235 lb)   SpO2 95%   BMI 31.00 kg/m²     Physical Exam  Eyes:      General: Lids are normal.      Extraocular Movements: Extraocular movements intact.      Pupils: Pupils are equal, round, and reactive to light.   Neurological:      Deep Tendon Reflexes:      Reflex Scores:       Tricep reflexes are 3+ on the right side and 3+ on the left side.       Bicep reflexes are 2+ on the right side and 2+ on the left side.       Patellar reflexes are 3+ on the right side and 3+ on the left side.       Achilles reflexes are 2+ on the right side and 2+ on the left " side.      Neurological Exam  Mental Status  Awake, alert and oriented to person, place and time. Oriented to person, place and time. Language is fluent with no aphasia.    Cranial Nerves  CN II: Visual acuity is normal. Visual fields full to confrontation.  CN III, IV, VI: Extraocular movements intact bilaterally. Normal lids and orbits bilaterally. Pupils equal round and reactive to light bilaterally.  CN V: Facial sensation is normal.  CN VII: Full and symmetric facial movement.  CN VIII: Hearing is normal.  CN IX, X: Palate elevates symmetrically. Normal gag reflex.  CN XI: Shoulder shrug strength is normal.  CN XII: Tongue midline without atrophy or fasciculations.    Motor    Right upper extremity was a 4+ out of 5 the left deltoid was a 4.  Handgrip was a 4 -.  On the left shoulder abduction it was a 4+ out of 5 biceps was a 4.  Triceps was a 4 out of 5 bilaterally.    On lower extremity testing hip flexion on the right was a 2 on the left was a 1.  Ankle dorsiflexion and plantarflexion was a 4+.  Knee flexion and knee extension was a 4 out of 5.        .    Sensory  He had decreased sensation in his legs with light touch.  Temperature was decreased in his lower extremities.  The patient was decreased in his feet and hands..    Reflexes                                            Right                      Left  Biceps                                 2+                         2+  Triceps                                3+                         3+  Patellar                                3+                         3+  Achilles                                2+                         2+  Right Plantar: downgoing  Left Plantar: downgoing    Right pathological reflexes: Domenica's absent.  Left pathological reflexes: Domenica's absent.    Gait    In a wheelchair he is unable to stand without assistance.  There is increased tone in the lower extremities..      I have spent a total time of 40  minutes in caring  for this patient on the day of the visit/encounter including Risks and benefits of tx options, Counseling / Coordination of care, Documenting in the medical record, Reviewing/placing orders in the medical record (including tests, medications, and/or procedures), and Obtaining or reviewing history  .   I spent a great deal of time today reviewing the EMG records, reports from [facility] and by [facility] leg only.    Follow-up appointment in 3 months.  Contact me in the next 2 weeks if his symptoms persist despite being off of baclofen.  Zanaflex may provide some benefit he may require further imaging of the spine.

## (undated) DEVICE — GAUZE SPONGE 4X4 12PLY

## (undated) DEVICE — BLADE SURG CARBON STEEL #10

## (undated) DEVICE — TAPE CLOTH SOFT MEDIPORE 4IN

## (undated) DEVICE — BLADE SAW SAG 22.13MM 0.92MM

## (undated) DEVICE — MANIFOLD 4 PORT

## (undated) DEVICE — ORTHOCORDVIOLET OS-6 36

## (undated) DEVICE — SUT 2/0 36IN COATED VICRYL

## (undated) DEVICE — HOOD T-5 TEAR AWAY STERILE

## (undated) DEVICE — DRAPE STERI INSTRUMENT 1018

## (undated) DEVICE — ELECTRODE BLADE TEFLON 6

## (undated) DEVICE — SPONGE LAP 18X18 PREWASHED

## (undated) DEVICE — PILLOW ABDUCTION FOAM MED

## (undated) DEVICE — SUPPORT ULNA NERVE PROTECTOR

## (undated) DEVICE — DRAPE HIP TIBURON 87X115X134

## (undated) DEVICE — DRAPE STERI U-SHAPED 47X51IN

## (undated) DEVICE — SEE MEDLINE ITEM 152530

## (undated) DEVICE — SOL IRR NACL .9% 3000ML

## (undated) DEVICE — SEE MEDLINE ITEM 146292

## (undated) DEVICE — SEE MEDLINE ITEM 157116

## (undated) DEVICE — SUT STRATAFIX PGAPCL 3 FS-1

## (undated) DEVICE — ALCOHOL 70% ISOP W/GREEN 16OZ

## (undated) DEVICE — PACK OPTIMA GEN ORTHO

## (undated) DEVICE — SEE MEDLINE ITEM 153151

## (undated) DEVICE — SEE MEDLINE ITEM 152622

## (undated) DEVICE — DRESSING TRANS 4X4 TEGADERM

## (undated) DEVICE — DRAPE INCISE IOBAN 2 23X33IN

## (undated) DEVICE — SEE MEDLINE ITEM 157117

## (undated) DEVICE — SYS CLSR DERMABOND PRINEO 22CM

## (undated) DEVICE — ELECTRODE REM PLYHSV RETURN 9

## (undated) DEVICE — TAPE SURG MEDIPORE 6X72IN

## (undated) DEVICE — APPLICATOR CHLORAPREP ORN 26ML

## (undated) DEVICE — GLOVE PROTEXIS HYDROGEL SZ8

## (undated) DEVICE — GLOVE 8 PROTEXIS PI ORTHO

## (undated) DEVICE — SUT CTD VICRYL CT-1 27

## (undated) DEVICE — PAD ABDOMINAL 5X9 STERILE

## (undated) DEVICE — SEE MEDLINE ITEM 156955

## (undated) DEVICE — SYRINGE 0.9% NACL 10MIL PREFIL

## (undated) DEVICE — GLOVE 8 PROTEXIS PI BLUE

## (undated) DEVICE — COVER OVERHEAD SURG LT BLUE

## (undated) DEVICE — GLOVE PROTEXIS HYDROGEL SZ7.5

## (undated) DEVICE — COVER BACK TABLE 72X21